# Patient Record
Sex: MALE | Race: WHITE | Employment: FULL TIME | URBAN - METROPOLITAN AREA
[De-identification: names, ages, dates, MRNs, and addresses within clinical notes are randomized per-mention and may not be internally consistent; named-entity substitution may affect disease eponyms.]

---

## 2017-01-09 ENCOUNTER — PRE VISIT (OUTPATIENT)
Dept: CARDIOLOGY | Facility: CLINIC | Age: 72
End: 2017-01-09

## 2017-01-12 ENCOUNTER — HOSPITAL ENCOUNTER (OUTPATIENT)
Dept: CARDIOLOGY | Facility: CLINIC | Age: 72
Discharge: HOME | End: 2017-01-12
Attending: INTERNAL MEDICINE | Admitting: INTERNAL MEDICINE
Payer: COMMERCIAL

## 2017-01-12 ENCOUNTER — OFFICE VISIT (OUTPATIENT)
Dept: CARDIOLOGY | Facility: CLINIC | Age: 72
End: 2017-01-12
Payer: COMMERCIAL

## 2017-01-12 VITALS
DIASTOLIC BLOOD PRESSURE: 80 MMHG | HEIGHT: 65 IN | SYSTOLIC BLOOD PRESSURE: 130 MMHG | WEIGHT: 176.7 LBS | BODY MASS INDEX: 29.44 KG/M2 | HEART RATE: 78 BPM

## 2017-01-12 DIAGNOSIS — E78.5 HYPERLIPIDEMIA LDL GOAL <130: ICD-10-CM

## 2017-01-12 DIAGNOSIS — I10 HYPERTENSION GOAL BP (BLOOD PRESSURE) < 140/90: ICD-10-CM

## 2017-01-12 DIAGNOSIS — I10 ESSENTIAL HYPERTENSION WITH GOAL BLOOD PRESSURE LESS THAN 140/90: ICD-10-CM

## 2017-01-12 DIAGNOSIS — I48.0 PAROXYSMAL ATRIAL FIBRILLATION (H): Primary | ICD-10-CM

## 2017-01-12 DIAGNOSIS — I48.0 PAROXYSMAL ATRIAL FIBRILLATION (H): ICD-10-CM

## 2017-01-12 PROCEDURE — 99214 OFFICE O/P EST MOD 30 MIN: CPT | Performed by: INTERNAL MEDICINE

## 2017-01-12 PROCEDURE — 0296T ZIO PATCH HOLTER: CPT

## 2017-01-12 PROCEDURE — 0298T ZZC EXT ECG > 48HR TO 21 DAY REVIEW AND INTERPRETATN: CPT | Performed by: INTERNAL MEDICINE

## 2017-01-12 PROCEDURE — 93000 ELECTROCARDIOGRAM COMPLETE: CPT | Performed by: INTERNAL MEDICINE

## 2017-01-12 NOTE — Clinical Note
1/12/2017    Michelle Morejon MD  [facility]  [address] [place] [address]    RE: Charlene Gutierrez       Dear Colleague,    I had the pleasure of seeing Charlene Gutierrez in the [facility].    REASON FOR VISIT:  Evaluation of paroxysmal atrial fibrillation.      Mr. Gutierrez is a pleasant 68-year-old gentleman with a history of hypertension, hyperlipidemia, paroxysmal atrial fibrillation who is here for routine evaluation.      I last saw him 11/2014.  At that time, he was doing well on flecainide.  I recommended decreasing the dose of flecainide and added metoprolol.      After our visit, he failed to follow up with us, and did not follow the instructions.  He informs that he attempted metoprolol, but he noticed his heart rate was slow and even though he was asymptomatic, he decided to discontinue it.  He has also discontinued the HCTZ (medication for blood pressure) as well as statin.      At the moment, he is doing well; however, he informs the last couple of months, he started having intermittent episodes of shortness of breath.  The episodes of shortness of breath seem to be atypical.  He describes as needing to take a sudden deep breath during moments in which he is sitting down.  He informs that the episode only lasted for a couple of seconds.  He cannot elaborate more on the episodes.  He denies any other symptoms of chest pain, lightheadedness, near-syncope or syncopal episode.      EKG done here today showed normal sinus rhythm with QRS measuring 102 milliseconds.     Outpatient Encounter Prescriptions as of 1/12/2017   Medication Sig Dispense Refill     flecainide (TAMBOCOR) 100 MG tablet Take 1 tablet (100 mg) by mouth 2 times daily 180 tablet 0     lisinopril (PRINIVIL,ZESTRIL) 5 MG tablet TAKE 1 TABLET DAILY 30 tablet 0     Elastic Bandages & Supports (JOBST FOR MEN KNEE HIGH/LG) MISC 1 each daily Use it on the right leg as instructed 1 each 1      sildenafil (VIAGRA) 50 MG tablet Take 0.5-1 tablets (25-50 mg) by mouth daily as needed for erectile dysfunction Take 30 min to 4 hours before intercourse.  Never use with nitroglycerin, terazosin or doxazosin. 36 tablet 0     ORDER FOR DME Equipment being ordered: heel lift- MD 1 Device 0     Elastic Bandage MISC 1 each daily. Use it on the right leg as instructed. 1 each 1     STATIN NOT PRESCRIBED, INTENTIONAL, by Other route continuous prn.  0     FISH OIL None Entered       ASPIRIN 81 MG OR TABS 1 TABLET DAILY       IRON 325 (65 FE) MG OR TABS 1 TABLET DAILY       [DISCONTINUED] hydrochlorothiazide (HYDRODIURIL) 25 MG tablet TAKE 1 TABLET DAILY 30 tablet 0     [DISCONTINUED] metoprolol (TOPROL-XL) 25 MG 24 hr tablet Take 0.5 tablets (12.5 mg) by mouth daily 30 tablet 3     No facility-administered encounter medications on file as of 1/12/2017.      ASSESSMENT AND PLAN:   1.  Sporadic episodes of atypical shortness of breath of unclear etiology.  I recommend repeating a Zio Patch monitor for 10 days.   2.  Paroxysmal atrial fibrillation, seems to be well controlled on flecainide.  At this time, I recommend restarting metoprolol and decrease the dose of flecainide in half.   3.  Embolic prevention.  CHADS-VASc score of 2.  I recommend starting anticoagulation; however, he is reluctant to do so.  We will continue aspirin.   4.  Hypertension.  Blood pressure is well controlled.   5.  Followup care.  Follow up in clinic in 6 months or earlier as needed.     Again, thank you for allowing me to participate in the care of your patient.      Sincerely,    Sreekanth Aguayo MD     [facility]    cc:   MIEK DEMPSEY  [facility]   [address] [place] [address]

## 2017-01-12 NOTE — PROGRESS NOTES
"HPI and Plan:   See dictation  [id number]    Physical Exam:  Vitals: /80 mmHg  Pulse 78  Ht 1.651 m (5' 5\")  Wt 80.151 kg (176 lb 11.2 oz)  BMI 29.40 kg/m2    Constitutional:  AAO x3.  Pt is in NAD.  HEAD: normocephalic.  SKIN: Skin normal color, texture and turgor with no lesions or eruptions.  Eyes: PERRL, EOMI.  ENT:  Supple, normal JVP. No lymphadenopathy or thyroid enlargement.  Chest:  CTAB.  Cardiac:  RRR, normal  S1 and S2.  No murmurs rubs or gallop.    Abdomen:  Normal BS.  Soft, non-tender and non-distended.  No rebound or guarding.    Extremities:  Pedious pulses palpable B/L.  No LE edema noticed.   Neurological: Strength and sensation grossly symmetric and intact throughout.       CURRENT MEDICATIONS:  Current Outpatient Prescriptions   Medication Sig Dispense Refill     flecainide (TAMBOCOR) 100 MG tablet Take 1 tablet (100 mg) by mouth 2 times daily 180 tablet 0     lisinopril (PRINIVIL,ZESTRIL) 5 MG tablet TAKE 1 TABLET DAILY 30 tablet 0     Elastic Bandages & Supports (JOBST FOR MEN KNEE HIGH/LG) MISC 1 each daily Use it on the right leg as instructed 1 each 1     sildenafil (VIAGRA) 50 MG tablet Take 0.5-1 tablets (25-50 mg) by mouth daily as needed for erectile dysfunction Take 30 min to 4 hours before intercourse.  Never use with nitroglycerin, terazosin or doxazosin. 36 tablet 0     ORDER FOR DME Equipment being ordered: heel lift- MD 1 Device 0     Elastic Bandage MISC 1 each daily. Use it on the right leg as instructed. 1 each 1     STATIN NOT PRESCRIBED, INTENTIONAL, by Other route continuous prn.  0     FISH OIL None Entered       ASPIRIN 81 MG OR TABS 1 TABLET DAILY       IRON 325 (65 FE) MG OR TABS 1 TABLET DAILY         ALLERGIES   No Known Allergies    PAST MEDICAL HISTORY:  Past Medical History   Diagnosis Date     HTN (hypertension), benign      Gastric ulcer      Hyperlipidaemia      Atrial fibrillation (H)        PAST SURGICAL HISTORY:  Past Surgical History   Procedure " Laterality Date     C incision of pyloric muscle       Bronchoscopy       Colonoscopy       Extracapsular cataract extration with intraocular lens implant  2003     right eye     Cardioversion  11/2013       FAMILY HISTORY:  Family History   Problem Relation Age of Onset     CANCER Mother      CANCER Father      Prostate Cancer No family hx of      Colon Cancer Father        SOCIAL HISTORY:  Social History     Social History     Marital Status:      Spouse Name: N/A     Number of Children: N/A     Years of Education: N/A     Social History Main Topics     Smoking status: Never Smoker      Smokeless tobacco: Never Used     Alcohol Use: Yes      Comment: ocass     Drug Use: No     Sexual Activity:     Partners: Female     Other Topics Concern     Parent/Sibling W/ Cabg, Mi Or Angioplasty Before 65f 55m? No      Service Yes     Blood Transfusions No     Caffeine Concern No     Occupational Exposure No     Hobby Hazards No     Sleep Concern No     Stress Concern No     Weight Concern No     Special Diet No     Back Care No     Exercise Yes     Bike Helmet No     Seat Belt Yes     Self-Exams No     Social History Narrative       Review of Systems:  Skin:  Negative     Eyes:  Positive for glasses  ENT:  Negative    Respiratory:  Positive for shortness of breath (posssibly due to medications)  Cardiovascular:  Negative    Gastroenterology: Negative    Genitourinary:  Negative    Musculoskeletal:  Positive for arthritis  Neurologic:  Negative    Psychiatric:  Negative    Heme/Lymph/Imm:  Negative    Endocrine:  Negative        Recent Lab Results:  LIPID RESULTS:  Lab Results   Component Value Date    CHOL 200* 05/28/2015    HDL 48 05/28/2015     05/28/2015    TRIG 130 05/28/2015    CHOLHDLRATIO 4.2 05/28/2015       LIVER ENZYME RESULTS:  Lab Results   Component Value Date    AST 17 05/28/2015    ALT 37 05/28/2015       CBC RESULTS:  Lab Results   Component Value Date    WBC 6.8 11/17/2013    RBC 4.60  11/17/2013    HGB 14.1 11/17/2013    HCT 41.2 11/17/2013    MCV 90 11/17/2013    MCH 30.7 11/17/2013    MCHC 34.2 11/17/2013    RDW 12.4 11/17/2013     11/17/2013       BMP RESULTS:  Lab Results   Component Value Date     05/28/2015    POTASSIUM 4.1 05/28/2015    CHLORIDE 104 05/28/2015    CO2 26 05/28/2015    ANIONGAP 8 05/28/2015    GLC 96 05/28/2015    BUN 22 05/28/2015    BUN 16.7 03/18/2014    CR 1.04 05/28/2015    GFRESTIMATED 71 05/28/2015    GFRESTBLACK 86 05/28/2015    CHUCKY 9.2 05/28/2015        A1C RESULTS:  Lab Results   Component Value Date    A1C 5.6 02/26/2013       INR RESULTS:  No results found for: INR      ECHOCARDIOGRAM  No results found for this or any previous visit (from the past 8760 hour(s)).      Orders Placed This Encounter   Procedures     Zio Patch Monitor     EKG 12-lead complete w/read - Clinics     No orders of the defined types were placed in this encounter.     Medications Discontinued During This Encounter   Medication Reason     hydrochlorothiazide (HYDRODIURIL) 25 MG tablet      metoprolol (TOPROL-XL) 25 MG 24 hr tablet          Encounter Diagnoses   Name Primary?     Essential hypertension with goal blood pressure less than 140/90      Hyperlipidemia LDL goal <130      Hypertension goal BP (blood pressure) < 140/90      Paroxysmal atrial fibrillation (H) Yes         CC  Michelle Morejon MD   [facility]  [address] [place] [address]

## 2017-01-13 NOTE — PROGRESS NOTES
REASON FOR VISIT:  Evaluation of paroxysmal atrial fibrillation.      HISTORY OF PRESENT ILLNESS:  Mr. Gutierrez is a pleasant 68-year-old gentleman with a history of hypertension, hyperlipidemia, paroxysmal atrial fibrillation who is here for routine evaluation.      I last saw him 11/2014.  At that time, he was doing well on flecainide.  I recommended decreasing the dose of flecainide and added metoprolol.      After our visit, he failed to follow up with us, and did not follow the instructions.  He informs that he attempted metoprolol, but he noticed his heart rate was slow and even though he was asymptomatic, he decided to discontinue it.  He has also discontinued the HCTZ (medication for blood pressure) as well as statin.      At the moment, he is doing well; however, he informs the last couple of months, he started having intermittent episodes of shortness of breath.  The episodes of shortness of breath seem to be atypical.  He describes as needing to take a sudden deep breath during moments in which he is sitting down.  He informs that the episode only lasted for a couple of seconds.  He cannot elaborate more on the episodes.  He denies any other symptoms of chest pain, lightheadedness, near-syncope or syncopal episode.      EKG done here today showed normal sinus rhythm with QRS measuring 102 milliseconds.      ASSESSMENT AND PLAN:   1.  Sporadic episodes of atypical shortness of breath of unclear etiology.  I recommend repeating a Zio Patch monitor for 10 days.   2.  Paroxysmal atrial fibrillation, seems to be well controlled on flecainide.  At this time, I recommend restarting metoprolol and decrease the dose of flecainide in half.   3.  Embolic prevention.  CHADS-VASc score of 2.  I recommend starting anticoagulation; however, he is reluctant to do so.  We will continue aspirin.   4.  Hypertension.  Blood pressure is well controlled.   5.  Followup care.  Follow up in clinic in 6 months or earlier as  needed.         MERE FARFAN MD             D: 2017 13:17   T: 2017 23:53   MT: ALISHA      Name:     ISIAH MERINO   MRN:      [id number]        Account:      [id number]   :      1945           Service Date: 2017      Document: [id number]

## 2017-01-31 ENCOUNTER — TELEPHONE (OUTPATIENT)
Dept: CARDIOLOGY | Facility: CLINIC | Age: 72
End: 2017-01-31

## 2017-01-31 DIAGNOSIS — I48.91 ATRIAL FIBRILLATION (H): Primary | ICD-10-CM

## 2017-01-31 NOTE — TELEPHONE ENCOUNTER
Message  Received: Today       Sreekanth Aguayo MD  [facility] Ep Nurse                     Dagoberto did not show any sustained arrhythmia.  Please update pt on results.  He was complaining of atypical SOB on daily basis when he saw me, and it does not seem that the SOB is related to arrhythmia.     Sreekanth       Writer called pt with results as above and verbalize understanding. Requesting clarification of what medications Dr. Aguayo would like him to take. States was told during OV on 1-12-17, to restart Metoprolol, so he has been taking 12.5 mg po BID, and secondly instructed to cut his Flecainide dosage in half, so he now takes Flecainide 100 mg (0.5 tablet) po BID from 100 mg po BID. In reviewing clinical note, Metoprolol has been discontinued. Pt states he will need RX sent to his [facility] for 90 day supplies if the Metoprolol is to be continued. Will route to Dr. Aguayo to clarify. PRIYA Padron RN.

## 2017-02-02 RX ORDER — METOPROLOL SUCCINATE 25 MG/1
12.5 TABLET, EXTENDED RELEASE ORAL DAILY
Qty: 45 TABLET | Refills: 3 | Status: SHIPPED | OUTPATIENT
Start: 2017-02-02 | End: 2017-06-02

## 2017-02-02 NOTE — TELEPHONE ENCOUNTER
Writer called pt and clarified dosage of Metoprolol. Pt verbalized undestanding to take  Metoprolol 25 mg, take 0.5 tablet, by mouth daily. Continue taking Flecainide 50 mg po BID. [facility] sent at pt's request. PRIYA Padron RN.

## 2017-03-13 ENCOUNTER — TRANSFERRED RECORDS (OUTPATIENT)
Dept: CARDIOLOGY | Facility: CLINIC | Age: 72
End: 2017-03-13

## 2017-03-13 LAB
ALBUMIN SERPL-MCNC: 3.9 G/DL
ALP SERPL-CCNC: 87 U/L
ALT SERPL-CCNC: 42 U/L
ANION GAP SERPL CALCULATED.3IONS-SCNC: 7 MMOL/L
AST SERPL-CCNC: 22 U/L
BILIRUB SERPL-MCNC: 0.6 MG/DL
BUN SERPL-MCNC: 23 MG/DL
CALCIUM SERPL-MCNC: 8.7 MG/DL
CHLORIDE SERPLBLD-SCNC: 108 MMOL/L
CHOL/HDLC RATIO - QUEST: 4.1
CHOLEST SERPL-MCNC: 188 MG/DL
CO2 SERPL-SCNC: 25 MMOL/L
CREAT SERPL-MCNC: 1.12 MG/DL
ERYTHROCYTE [DISTWIDTH] IN BLOOD BY AUTOMATED COUNT: 12.7 %
FERRITIN SERPL-MCNC: 135 NG/ML
GFR SERPL CREATININE-BSD FRML MDRD: >60 ML/MIN/1.73M2
GLUCOSE SERPL-MCNC: 101 MG/DL (ref 70–99)
HBA1C MFR BLD: 5.5 % (ref 0–5.7)
HCT VFR BLD AUTO: 40.7 %
HDLC SERPL-MCNC: 46 MG/DL
HEMOGLOBIN: 13.6 G/DL
IRON SATURATION: 30
IRON: 103 UG/DL
LDLC SERPL CALC-MCNC: 120 MG/DL
MCH RBC QN AUTO: 31.1 PG
MCHC RBC AUTO-ENTMCNC: 33.4 G/DL
MCV RBC AUTO: 93 FL
PLATELET # BLD AUTO: 249 10^9/L
POTASSIUM SERPL-SCNC: 4.4 MMOL/L
PROT SERPL-MCNC: 7 G/DL
RBC # BLD AUTO: 4.38 10^12/L
SODIUM SERPL-SCNC: 140 MMOL/L
TIBC - QUEST: 349
TRIGL SERPL-MCNC: 112 MG/DL
WBC # BLD AUTO: 7.1 10^9/L

## 2017-04-25 DIAGNOSIS — I48.0 PAROXYSMAL ATRIAL FIBRILLATION (H): Primary | ICD-10-CM

## 2017-04-25 RX ORDER — FLECAINIDE ACETATE 100 MG/1
100 TABLET ORAL 2 TIMES DAILY
Qty: 270 TABLET | Refills: 2 | Status: SHIPPED | OUTPATIENT
Start: 2017-04-25 | End: 2017-06-09

## 2017-04-25 NOTE — TELEPHONE ENCOUNTER
Request received from [facility] to re write Rx to dispense for 90 days, Resent new Rx. REJI Pryor

## 2017-05-31 ENCOUNTER — TELEPHONE (OUTPATIENT)
Dept: CARDIOLOGY | Facility: CLINIC | Age: 72
End: 2017-05-31

## 2017-05-31 ENCOUNTER — PRE VISIT (OUTPATIENT)
Dept: CARDIOLOGY | Facility: CLINIC | Age: 72
End: 2017-05-31

## 2017-05-31 ASSESSMENT — CHADS2 SCORE
HTN: YES
SEX: MALE
STROK/TIA/THROM: NO
DIABETES: NO
VASCULAR DISEASE: NO
CHF: NO
AGE: 65-74

## 2017-05-31 NOTE — TELEPHONE ENCOUNTER
"Charlene calls this morning as a transfer from scheduling to discuss some symptoms and medications. He was last seen in clinic by Dr. Aguayo in January 2017 for paroxysmal afib follow up - at that time he was restarted on metoprolol 12.5 (previously stopped taking on his own) and flecainide was reduced to 50 mg bid. Charlene calls today to say that for the last 2-3 days he has been having periods of intermittent afib where his pulse will range from 80s-1teens. He has no associated symptoms of shortness of breath, chest pain, lightheadedness, dizziness etc and states that he feels \"almost normal\". These periods of afib have only lasted an hour or two and will spontaneously break on their own - he is however, only taking aspirin as he has been hesitant to start anticoagulation. I spoke with Dr. Aguayo and he would like Charlene to increase the flecainide back to 75 mg twice daily. Charlene verbalized understanding of this as well as his follow up appointment on Friday with Symone Luna. No further questions/concerns. REJI Edmonds   "

## 2017-06-02 ENCOUNTER — DOCUMENTATION ONLY (OUTPATIENT)
Dept: CARDIOLOGY | Facility: CLINIC | Age: 72
End: 2017-06-02

## 2017-06-02 ENCOUNTER — OFFICE VISIT (OUTPATIENT)
Dept: CARDIOLOGY | Facility: CLINIC | Age: 72
End: 2017-06-02
Payer: COMMERCIAL

## 2017-06-02 VITALS
SYSTOLIC BLOOD PRESSURE: 118 MMHG | BODY MASS INDEX: 28.66 KG/M2 | WEIGHT: 172 LBS | HEART RATE: 84 BPM | DIASTOLIC BLOOD PRESSURE: 64 MMHG | HEIGHT: 65 IN

## 2017-06-02 DIAGNOSIS — I48.19 PERSISTENT ATRIAL FIBRILLATION (H): Primary | ICD-10-CM

## 2017-06-02 DIAGNOSIS — I48.91 ATRIAL FIBRILLATION, UNSPECIFIED TYPE (H): Primary | ICD-10-CM

## 2017-06-02 PROCEDURE — 99214 OFFICE O/P EST MOD 30 MIN: CPT | Mod: 25 | Performed by: PHYSICIAN ASSISTANT

## 2017-06-02 PROCEDURE — 93000 ELECTROCARDIOGRAM COMPLETE: CPT | Performed by: PHYSICIAN ASSISTANT

## 2017-06-02 RX ORDER — METOPROLOL SUCCINATE 25 MG/1
25 TABLET, EXTENDED RELEASE ORAL DAILY
Start: 2017-06-02 | End: 2017-08-01

## 2017-06-02 NOTE — PROGRESS NOTES
HPI and Plan:   See dictation #[id number]    Orders Placed This Encounter   Procedures     EKG 12-lead complete w/read - Clinics (performed today)       Orders Placed This Encounter   Medications     apixaban ANTICOAGULANT (ELIQUIS) 5 MG tablet     Sig: Take 1 tablet (5 mg) by mouth 2 times daily Samples given       Medications Discontinued During This Encounter   Medication Reason     ASPIRIN 81 MG OR TABS          Encounter Diagnosis   Name Primary?     Atrial fibrillation, unspecified type (H) Yes       CURRENT MEDICATIONS:  Current Outpatient Prescriptions   Medication Sig Dispense Refill     apixaban ANTICOAGULANT (ELIQUIS) 5 MG tablet Take 1 tablet (5 mg) by mouth 2 times daily Samples given       flecainide (TAMBOCOR) 100 MG tablet Take 1 tablet (100 mg) by mouth 2 times daily (Patient taking differently: Take 100 mg by mouth 2 times daily 75 twice daily) 270 tablet 2     metoprolol (TOPROL XL) 25 MG 24 hr tablet Take 0.5 tablets (12.5 mg) by mouth daily 45 tablet 3     lisinopril (PRINIVIL,ZESTRIL) 5 MG tablet TAKE 1 TABLET DAILY 30 tablet 0     Elastic Bandages & Supports (JOBST FOR MEN KNEE HIGH/LG) MISC 1 each daily Use it on the right leg as instructed 1 each 1     sildenafil (VIAGRA) 50 MG tablet Take 0.5-1 tablets (25-50 mg) by mouth daily as needed for erectile dysfunction Take 30 min to 4 hours before intercourse.  Never use with nitroglycerin, terazosin or doxazosin. 36 tablet 0     ORDER FOR DME Equipment being ordered: heel lift- MD 1 Device 0     Elastic Bandage MISC 1 each daily. Use it on the right leg as instructed. 1 each 1     STATIN NOT PRESCRIBED, INTENTIONAL, by Other route continuous prn.  0     FISH OIL None Entered       IRON 325 (65 FE) MG OR TABS 1 TABLET DAILY         ALLERGIES   No Known Allergies    PAST MEDICAL HISTORY:  Past Medical History:   Diagnosis Date     Atrial fibrillation (H) 11/17/2013     CKD (chronic kidney disease) 2012     Gastric ulcer      HTN (hypertension), benign  "     Hyperlipidaemia        PAST SURGICAL HISTORY:  Past Surgical History:   Procedure Laterality Date     BRONCHOSCOPY       C INCISION OF PYLORIC MUSCLE       CARDIOVERSION  11/2013     COLONOSCOPY       EXTRACAPSULAR CATARACT EXTRATION WITH INTRAOCULAR LENS IMPLANT  2003    right eye       FAMILY HISTORY:  Family History   Problem Relation Age of Onset     CANCER Mother      CANCER Father      Prostate Cancer No family hx of      Colon Cancer Father        SOCIAL HISTORY:  Social History     Social History     Marital status:      Spouse name: N/A     Number of children: N/A     Years of education: N/A     Social History Main Topics     Smoking status: Never Smoker     Smokeless tobacco: Never Used     Alcohol use Yes      Comment: ocass     Drug use: No     Sexual activity: Yes     Partners: Female     Other Topics Concern     Parent/Sibling W/ Cabg, Mi Or Angioplasty Before 65f 55m? No      Service Yes     Blood Transfusions No     Caffeine Concern No     Occupational Exposure No     Hobby Hazards No     Sleep Concern No     Stress Concern No     Weight Concern No     Special Diet No     Back Care No     Exercise Yes     Bike Helmet No     Seat Belt Yes     Self-Exams No     Social History Narrative       Review of Systems:  Skin:  Negative       Eyes:  Positive for glasses    ENT:  Negative      Respiratory:  Positive for shortness of breath;dyspnea on exertion     Cardiovascular:  Negative for;palpitations;chest pain;edema;syncope or near-syncope;exercise intolerance;fatigue;lightheadedness;dizziness      Gastroenterology: Negative for melena;hematochezia    Genitourinary:  Negative      Musculoskeletal:  Positive for arthritis    Neurologic:  Negative      Psychiatric:  Negative      Heme/Lymph/Imm:  Negative      Endocrine:  Negative        Physical Exam:  Vitals: /64  Pulse 84  Ht 1.651 m (5' 5\")  Wt 78 kg (172 lb)  BMI 28.62 kg/m2    Constitutional:  cooperative, alert and " oriented, well developed, well nourished, in no acute distress        Skin:  warm and dry to the touch, no apparent skin lesions or masses noted        Head:  normocephalic, no masses or lesions        Eyes:  conjunctivae and lids unremarkable;sclera white;pupils equal and round;no xanthalasma        ENT:  no pallor or cyanosis, dentition good        Neck:  JVP normal;no carotid bruit        Chest:  normal breath sounds, clear to auscultation, normal A-P diameter, normal symmetry, normal respiratory excursion, no use of accessory muscles          Cardiac:   irregular rhythm                Abdomen:  abdomen soft        Vascular: pulses full and equal                                        Extremities and Back:  no deformities, clubbing, cyanosis, erythema observed;no edema              Neurological:  affect appropriate, oriented to time, person and place

## 2017-06-02 NOTE — PROGRESS NOTES
Called pt after d/w Dr. Aguayo.     Will increase metoprolol XL to 25 mg daily  Continue flecainde 75 mg twice daily. May increase to 100 mg BID at time of DCCV    Set up for RHETT/DCCV. H&P done for this at today's OV 6/2/17.    Voiced understanding - ready to proceed. I lost pt during transfer ... Precious will call to set this up.

## 2017-06-02 NOTE — MR AVS SNAPSHOT
After Visit Summary   6/2/2017    Charlene Gutierrez    MRN: [id number]           Patient Information     Date Of Birth          1945        Visit Information        Provider Department      6/2/2017 7:30 AM Jyoti Luna PA-C [facility] HEART AT [place]        Today's Diagnoses     Atrial fibrillation, unspecified type (H)    -  1      Care Instructions    1. EKG today looks like atrial fibrillation vs atrial flutter. I'll talk to Dr. Aguayo and call you with his recommendations. He might want RHETT/cardioversion or may want just cardioversion in 1 month OR may just want to continue medications    2. Start Eliquis 5 mg twice a day (blood thinner). DO NOT STOP!!!      3. Stop aspirin.    4. AFib nurses Hailey/Jaz: [phone]          Follow-ups after your visit        Who to contact     If you have questions or need follow up information about today's clinic visit or your schedule please contact [facility] HEART [facility] directly at [phone].  Normal or non-critical lab and imaging results will be communicated to you by [employer], letter or phone within 4 business days after the clinic has received the results. If you do not hear from us within 7 days, please contact the clinic through [employer] or phone. If you have a critical or abnormal lab result, we will notify you by phone as soon as possible.  Submit refill requests through [employer] or call your pharmacy and they will forward the refill request to us. Please allow 3 business days for your refill to be completed.          Additional Information About Your Visit        [employer] Information     [employer] gives you secure access to your electronic health record. If you see a primary care provider, you can also send messages to your care team and make appointments. If you have questions, please call your primary care clinic.  If you do not have a primary care provider, please call  "[phone] and they will assist you.        Care EveryWhere ID     This is your Care EveryWhere ID. This could be used by other organizations to access your [place] medical records  [id number]        Your Vitals Were     Pulse Height BMI (Body Mass Index)             84 1.651 m (5' 5\") 28.62 kg/m2          Blood Pressure from Last 3 Encounters:   06/02/17 118/64   01/12/17 130/80   11/26/14 124/68    Weight from Last 3 Encounters:   06/02/17 78 kg (172 lb)   01/12/17 80.2 kg (176 lb 11.2 oz)   11/26/14 73 kg (161 lb)              We Performed the Following     EKG 12-lead complete w/read - Clinics (performed today)          Today's Medication Changes          These changes are accurate as of: 6/2/17  8:02 AM.  If you have any questions, ask your nurse or doctor.               Start taking these medicines.        Dose/Directions    apixaban ANTICOAGULANT 5 MG tablet   Commonly known as:  ELIQUIS   Used for:  Atrial fibrillation, unspecified type (H)   Started by:  Jyoti Luna PA-C        Dose:  5 mg   Take 1 tablet (5 mg) by mouth 2 times daily Samples given   Refills:  0         These medicines have changed or have updated prescriptions.        Dose/Directions    flecainide 100 MG tablet   Commonly known as:  TAMBOCOR   This may have changed:  additional instructions   Used for:  Paroxysmal atrial fibrillation (H)        Dose:  100 mg   Take 1 tablet (100 mg) by mouth 2 times daily   Quantity:  270 tablet   Refills:  2         Stop taking these medicines if you haven't already. Please contact your care team if you have questions.     aspirin 81 MG tablet   Stopped by:  Jyoti Luna PA-C                Where to get your medicines      Some of these will need a paper prescription and others can be bought over the counter.  Ask your nurse if you have questions.     You don't need a prescription for these medications     apixaban ANTICOAGULANT 5 MG tablet                Primary Care " Provider Office Phone # Fax #    Ann [place] [phone]       [facility] [address] [place] [address]        Thank you!     Thank you for choosing [facility] AT [place]  for your care. Our goal is always to provide you with excellent care. Hearing back from our patients is one way we can continue to improve our services. Please take a few minutes to complete the written survey that you may receive in the mail after your visit with us. Thank you!             Your Updated Medication List - Protect others around you: Learn how to safely use, store and throw away your medicines at www.disposemymeds.org.          This list is accurate as of: 6/2/17  8:02 AM.  Always use your most recent med list.                   Brand Name Dispense Instructions for use    apixaban ANTICOAGULANT 5 MG tablet    ELIQUIS     Take 1 tablet (5 mg) by mouth 2 times daily Samples given       * Elastic Bandage Misc     1 each    1 each daily. Use it on the right leg as instructed.       * JOBST FOR MEN KNEE HIGH/LG Misc     1 each    1 each daily Use it on the right leg as instructed       FISH OIL      None Entered       flecainide 100 MG tablet    TAMBOCOR    270 tablet    Take 1 tablet (100 mg) by mouth 2 times daily       iron 325 (65 FE) MG tablet      1 TABLET DAILY       lisinopril 5 MG tablet    PRINIVIL/ZESTRIL    30 tablet    TAKE 1 TABLET DAILY       metoprolol 25 MG 24 hr tablet    TOPROL XL    45 tablet    Take 0.5 tablets (12.5 mg) by mouth daily       order for DME     1 Device    Equipment being ordered: heel lift- MD       sildenafil 50 MG cap/tab    REVATIO/VIAGRA    36 tablet    Take 0.5-1 tablets (25-50 mg) by mouth daily as needed for erectile dysfunction Take 30 min to 4 hours before intercourse.  Never use with nitroglycerin, terazosin or doxazosin.       STATIN NOT PRESCRIBED (INTENTIONAL)      by Other route continuous prn.       * Notice:   This list has 2 medication(s) that are the same as other medications prescribed for you. Read the directions carefully, and ask your doctor or other care provider to review them with you.

## 2017-06-02 NOTE — PATIENT INSTRUCTIONS
1. EKG today looks like atrial fibrillation vs atrial flutter. I'll talk to Dr. Aguayo and call you with his recommendations. He might want RHETT/cardioversion or may want just cardioversion in 1 month OR may just want to continue medications    2. Start Eliquis 5 mg twice a day (blood thinner). DO NOT STOP!!!      3. Stop aspirin.    4. AFib nurses Hailey/Jaz: [phone]

## 2017-06-02 NOTE — LETTER
6/2/2017    MIKE  DEMPSEY  [facility]   [address]   [place] [address]    RE: Charlene Gutierrez       Dear Colleague,    I had the pleasure of meeting Charlene today when he came for concerns regarding recurrent atrial fibrillation.  He is a pleasant 71-year-old man who has a history of hypertension and dyslipidemia.  He was first diagnosed with atrial fibrillation back in 10/2013 when he presented to the Emergency Department and underwent DC cardioversion.  He had seen Dr. Aguayo and for a short time had been on Multaq.  He later was switched to a combination of flecainide and metoprolol.  When he saw Dr. Aguayo back in January, the patient had discontinued metoprolol on his own for no apparent reason.  He had also stopped his statin and hydrochlorothiazide.  At that appointment, Dr. Aguayo elicited a complaint of occasional shortness of breath.  He asked him to decrease flecainide to 50 mg twice daily, start metoprolol succinate 12.5 mg daily and ordered a Zio Patch to ensure that episodes of shortness of breath were not related to atrial fibrillation.  Unfortunately, despite a CHADS-VASc score of 2 (hypertension and age), he refused anticoagulation and remained only on aspirin.      Followup Zio Patch showed no evidence of atrial fibrillation.  He did have some atrial tachycardia and PSVT less than 14 beats.  These were asymptomatic.  His shortness of breath has improved, but no specific etiology has been found for this.      On 05/31 he contacted our office due to concerns regarding irregular heart rate.  He states that he would occasionally feel lightheadedness starting on over Memorial Day weekend.  He checked his pulse and noted it was irregular, anywhere from the 90s to 110s.  Blood pressures were anywhere from the 100s to the 150s, with the majority of these in the low 100s.  He contacted our nurse, and Dr. Aguayo recommended increasing his flecainide to 75 mg twice daily and  continuing metoprolol.  He has been on this higher dose just since 05/31.      Mr. Gutierrez thinks that he has remained in atrial fibrillation despite the increase in his flecainide dosing.  His heart rate continues to be irregular, but he denies any problems with shortness of breath, palpitations of significant lightheadedness.  He gets occasional dizziness but states that this is not dramatic.  He denies edema, orthopnea or PND.  He cannot think of any reason why he would have gone into atrial fibrillation, specifically denying increased use of alcohol or caffeine.      EKG today, which I overread, confirms atrial fibrillation at 93 beats per minute, has the left anterior fascicular block, QRS duration was 97 milliseconds.  Heart rate in AFib was under control at 93 beats per minute.   Outpatient Encounter Prescriptions as of 6/2/2017   Medication Sig Dispense Refill     apixaban ANTICOAGULANT (ELIQUIS) 5 MG tablet Take 1 tablet (5 mg) by mouth 2 times daily Samples given       flecainide (TAMBOCOR) 100 MG tablet Take 1 tablet (100 mg) by mouth 2 times daily (Patient taking differently: Take 100 mg by mouth 2 times daily 75 twice daily) 270 tablet 2     [DISCONTINUED] metoprolol (TOPROL XL) 25 MG 24 hr tablet Take 0.5 tablets (12.5 mg) by mouth daily 45 tablet 3     lisinopril (PRINIVIL,ZESTRIL) 5 MG tablet TAKE 1 TABLET DAILY 30 tablet 0     Elastic Bandages & Supports (JOBST FOR MEN KNEE HIGH/LG) MISC 1 each daily Use it on the right leg as instructed 1 each 1     sildenafil (VIAGRA) 50 MG tablet Take 0.5-1 tablets (25-50 mg) by mouth daily as needed for erectile dysfunction Take 30 min to 4 hours before intercourse.  Never use with nitroglycerin, terazosin or doxazosin. 36 tablet 0     ORDER FOR DME Equipment being ordered: heel lift- MD 1 Device 0     Elastic Bandage MISC 1 each daily. Use it on the right leg as instructed. 1 each 1     STATIN NOT PRESCRIBED, INTENTIONAL, by Other route continuous prn.  0      FISH OIL None Entered       IRON 325 (65 FE) MG OR TABS 1 TABLET DAILY       [DISCONTINUED] ASPIRIN 81 MG OR TABS 1 TABLET DAILY       No facility-administered encounter medications on file as of 6/2/2017.       ASSESSMENT AND PLAN:     1.  Recurrent persistent atrial fibrillation.  He has been in AFib, he believes at least since Memorial Day weekend.  He does not think he has had paroxysms of this but instead thinks that this has been continuous.  Given his CHADS-VASc score of 2, I will initiate anticoagulation.  I will have him stop baby aspirin and start Eliquis 5 mg twice daily.  His most recent CBC done at his primary care physician's office on 03/13/2017 was 13.6g/dL.  He does continue iron supplementations.  I will talk to Dr. Aguayo about proceeding with a RHETT/DC cardioversion versus starting anticoagulation for a month and proceeding with cardioversion after a month, his heart rate is under adequate control in the 90s, so until I talk to Dr. Aguayo I will not change any of his medication doses.      We did talk about the risks, benefits and indications of both RHETT and DC cardioversion should Dr. Aguayo want to proceed with this including but not limited to esophageal injury, sore throat, risk of conscious sedation, need for a  and with cardioversion increased risk of stroke (hopefully mitigated with the use of Eliquis), surface burns and tachy or bradyarrhythmias requiring additional treatment.  He voiced understanding and would be willing to proceed if Dr. Aguayo agrees.      I will be in touch with him as soon as I speak with Dr. Aguayo.      2.  Hypertension.  Blood pressure is actually under good control.  He does not take the lisinopril 5 mg if his blood pressure is under 100, which has happened occasionally, but does take it most of the time.  We will have him continue his current medications with the exception of discontinuing aspirin as above.      It has been a pleasure to see him in clinic.  I  will speak with Dr. Aguayo and call him.     Sincerely,    Jyoti Luna PA-C     [facility]

## 2017-06-02 NOTE — PROGRESS NOTES
HISTORY OF PRESENT ILLNESS:  I had the pleasure of meeting Charlene today when he came for concerns regarding recurrent atrial fibrillation.  He is a pleasant 71-year-old man who has a history of hypertension and dyslipidemia.  He was first diagnosed with atrial fibrillation back in 10/2013 when he presented to the Emergency Department and underwent DC cardioversion.  He had seen Dr. Aguayo and for a short time had been on Multaq.  He later was switched to a combination of flecainide and metoprolol.  When he saw Dr. Aguayo back in January, the patient had discontinued metoprolol on his own for no apparent reason.  He had also stopped his statin and hydrochlorothiazide.  At that appointment, Dr. Aguayo elicited a complaint of occasional shortness of breath.  He asked him to decrease flecainide to 50 mg twice daily, start metoprolol succinate 12.5 mg daily and ordered a Zio Patch to ensure that episodes of shortness of breath were not related to atrial fibrillation.  Unfortunately, despite a CHADS-VASc score of 2 (hypertension and age), he refused anticoagulation and remained only on aspirin.      Followup Zio Patch showed no evidence of atrial fibrillation.  He did have some atrial tachycardia and PSVT less than 14 beats.  These were asymptomatic.  His shortness of breath has improved, but no specific etiology has been found for this.      On 05/31 he contacted our office due to concerns regarding irregular heart rate.  He states that he would occasionally feel lightheadedness starting on over Memorial Day weekend.  He checked his pulse and noted it was irregular, anywhere from the 90s to 110s.  Blood pressures were anywhere from the 100s to the 150s, with the majority of these in the low 100s.  He contacted our nurse, and Dr. Aguayo recommended increasing his flecainide to 75 mg twice daily and continuing metoprolol.  He has been on this higher dose just since 05/31.      Mr. Gutierrez thinks that he has remained in atrial  fibrillation despite the increase in his flecainide dosing.  His heart rate continues to be irregular, but he denies any problems with shortness of breath, palpitations of significant lightheadedness.  He gets occasional dizziness but states that this is not dramatic.  He denies edema, orthopnea or PND.  He cannot think of any reason why he would have gone into atrial fibrillation, specifically denying increased use of alcohol or caffeine.      EKG today, which I overread, confirms atrial fibrillation at 93 beats per minute, has the left anterior fascicular block, QRS duration was 97 milliseconds.  Heart rate in AFib was under control at 93 beats per minute.        ASSESSMENT AND PLAN:     1.  Recurrent persistent atrial fibrillation.  He has been in AFib, he believes at least since Memorial Day weekend.  He does not think he has had paroxysms of this but instead thinks that this has been continuous.  Given his CHADS-VASc score of 2, I will initiate anticoagulation.  I will have him stop baby aspirin and start Eliquis 5 mg twice daily.  His most recent CBC done at his primary care physician's office on 03/13/2017 was 13.6g/dL.  He does continue iron supplementations.  I will talk to Dr. Aguayo about proceeding with a RHETT/DC cardioversion versus starting anticoagulation for a month and proceeding with cardioversion after a month, his heart rate is under adequate control in the 90s, so until I talk to Dr. Aguayo I will not change any of his medication doses.      We did talk about the risks, benefits and indications of both RHETT and DC cardioversion should Dr. Aguayo want to proceed with this including but not limited to esophageal injury, sore throat, risk of conscious sedation, need for a  and with cardioversion increased risk of stroke (hopefully mitigated with the use of Eliquis), surface burns and tachy or bradyarrhythmias requiring additional treatment.  He voiced understanding and would be willing to proceed  if Dr. Aguayo agrees.      I will be in touch with him as soon as I speak with Dr. Aguayo.      2.  Hypertension.  Blood pressure is actually under good control.  He does not take the lisinopril 5 mg if his blood pressure is under 100, which has happened occasionally, but does take it most of the time.  We will have him continue his current medications with the exception of discontinuing aspirin as above.      It has been a pleasure to see him in clinic.  I will speak with Dr. Aguayo and call him.         DANIELA BRYAN PA-C             D: 2017 08:13   T: 2017 15:22   MT: ALISHA      Name:     ISIAH MERINO   MRN:      [id number]        Account:      [id number]   :      1945           Service Date: 2017      Document: [id number]

## 2017-06-05 ENCOUNTER — ANESTHESIA EVENT (OUTPATIENT)
Dept: SURGERY | Facility: CLINIC | Age: 72
End: 2017-06-05
Payer: COMMERCIAL

## 2017-06-05 ENCOUNTER — SURGERY (OUTPATIENT)
Age: 72
End: 2017-06-05

## 2017-06-05 ENCOUNTER — HOSPITAL ENCOUNTER (OUTPATIENT)
Dept: CARDIOLOGY | Facility: CLINIC | Age: 72
End: 2017-06-05
Attending: PHYSICIAN ASSISTANT | Admitting: INTERNAL MEDICINE
Payer: COMMERCIAL

## 2017-06-05 ENCOUNTER — ANESTHESIA (OUTPATIENT)
Dept: SURGERY | Facility: CLINIC | Age: 72
End: 2017-06-05
Payer: COMMERCIAL

## 2017-06-05 ENCOUNTER — HOSPITAL ENCOUNTER (OUTPATIENT)
Dept: SURGERY | Facility: CLINIC | Age: 72
End: 2017-06-05
Attending: PHYSICIAN ASSISTANT | Admitting: INTERNAL MEDICINE
Payer: COMMERCIAL

## 2017-06-05 ENCOUNTER — HOSPITAL ENCOUNTER (OUTPATIENT)
Facility: CLINIC | Age: 72
Discharge: HOME | End: 2017-06-05
Attending: INTERNAL MEDICINE | Admitting: INTERNAL MEDICINE
Payer: COMMERCIAL

## 2017-06-05 VITALS
RESPIRATION RATE: 8 BRPM | SYSTOLIC BLOOD PRESSURE: 101 MMHG | BODY MASS INDEX: 27.74 KG/M2 | OXYGEN SATURATION: 98 % | HEIGHT: 66 IN | TEMPERATURE: 97.8 F | DIASTOLIC BLOOD PRESSURE: 77 MMHG | WEIGHT: 172.6 LBS | HEART RATE: 89 BPM

## 2017-06-05 DIAGNOSIS — I48.19 PERSISTENT ATRIAL FIBRILLATION (H): ICD-10-CM

## 2017-06-05 LAB
INR PPP: 1.18 (ref 0.86–1.14)
MAGNESIUM SERPL-MCNC: 2.1 MG/DL (ref 1.6–2.3)
POTASSIUM SERPL-SCNC: 4.4 MMOL/L (ref 3.4–5.3)

## 2017-06-05 PROCEDURE — 83735 ASSAY OF MAGNESIUM: CPT | Performed by: INTERNAL MEDICINE

## 2017-06-05 PROCEDURE — 84132 ASSAY OF SERUM POTASSIUM: CPT | Performed by: INTERNAL MEDICINE

## 2017-06-05 PROCEDURE — 92960 CARDIOVERSION ELECTRIC EXT: CPT

## 2017-06-05 PROCEDURE — 93325 DOPPLER ECHO COLOR FLOW MAPG: CPT

## 2017-06-05 PROCEDURE — 25000128 H RX IP 250 OP 636: Performed by: INTERNAL MEDICINE

## 2017-06-05 PROCEDURE — 37000008 ZZH ANESTHESIA TECHNICAL FEE, 1ST 30 MIN

## 2017-06-05 PROCEDURE — 93320 DOPPLER ECHO COMPLETE: CPT | Mod: 26 | Performed by: INTERNAL MEDICINE

## 2017-06-05 PROCEDURE — 93325 DOPPLER ECHO COLOR FLOW MAPG: CPT | Mod: 26 | Performed by: INTERNAL MEDICINE

## 2017-06-05 PROCEDURE — 25000132 ZZH RX MED GY IP 250 OP 250 PS 637: Performed by: INTERNAL MEDICINE

## 2017-06-05 PROCEDURE — 85610 PROTHROMBIN TIME: CPT | Performed by: INTERNAL MEDICINE

## 2017-06-05 PROCEDURE — 25000125 ZZHC RX 250: Performed by: INTERNAL MEDICINE

## 2017-06-05 PROCEDURE — 40000010 ZZH STATISTIC ANES STAT CODE-CRNA PER MINUTE

## 2017-06-05 PROCEDURE — 25000125 ZZHC RX 250: Performed by: NURSE ANESTHETIST, CERTIFIED REGISTERED

## 2017-06-05 PROCEDURE — 92960 CARDIOVERSION ELECTRIC EXT: CPT | Performed by: INTERNAL MEDICINE

## 2017-06-05 PROCEDURE — 40000857 ZZH STATISTIC TEE INCLUDES SEDATION

## 2017-06-05 PROCEDURE — 93312 ECHO TRANSESOPHAGEAL: CPT | Mod: 26 | Performed by: INTERNAL MEDICINE

## 2017-06-05 RX ORDER — POTASSIUM CHLORIDE 1500 MG/1
40 TABLET, EXTENDED RELEASE ORAL
Status: DISCONTINUED | OUTPATIENT
Start: 2017-06-05 | End: 2017-06-05 | Stop reason: HOSPADM

## 2017-06-05 RX ORDER — GLYCOPYRROLATE 0.2 MG/ML
0.1 INJECTION, SOLUTION INTRAMUSCULAR; INTRAVENOUS ONCE
Status: COMPLETED | OUTPATIENT
Start: 2017-06-05 | End: 2017-06-05

## 2017-06-05 RX ORDER — NALOXONE HYDROCHLORIDE 0.4 MG/ML
.1-.4 INJECTION, SOLUTION INTRAMUSCULAR; INTRAVENOUS; SUBCUTANEOUS
Status: DISCONTINUED | OUTPATIENT
Start: 2017-06-05 | End: 2017-06-05 | Stop reason: HOSPADM

## 2017-06-05 RX ORDER — SODIUM CHLORIDE 9 MG/ML
1000 INJECTION, SOLUTION INTRAVENOUS CONTINUOUS
Status: DISCONTINUED | OUTPATIENT
Start: 2017-06-05 | End: 2017-06-05 | Stop reason: HOSPADM

## 2017-06-05 RX ORDER — FENTANYL CITRATE 50 UG/ML
25-50 INJECTION, SOLUTION INTRAMUSCULAR; INTRAVENOUS
Status: COMPLETED | OUTPATIENT
Start: 2017-06-05 | End: 2017-06-05

## 2017-06-05 RX ORDER — FENTANYL CITRATE 50 UG/ML
25 INJECTION, SOLUTION INTRAMUSCULAR; INTRAVENOUS
Status: DISCONTINUED | OUTPATIENT
Start: 2017-06-05 | End: 2017-06-05 | Stop reason: HOSPADM

## 2017-06-05 RX ORDER — LIDOCAINE HYDROCHLORIDE 40 MG/ML
SOLUTION TOPICAL ONCE
Status: COMPLETED | OUTPATIENT
Start: 2017-06-05 | End: 2017-06-05

## 2017-06-05 RX ORDER — ATROPINE SULFATE 0.1 MG/ML
.5-1 INJECTION INTRAVENOUS
Status: DISCONTINUED | OUTPATIENT
Start: 2017-06-05 | End: 2017-06-05 | Stop reason: HOSPADM

## 2017-06-05 RX ORDER — POTASSIUM CHLORIDE 1500 MG/1
20 TABLET, EXTENDED RELEASE ORAL
Status: DISCONTINUED | OUTPATIENT
Start: 2017-06-05 | End: 2017-06-05 | Stop reason: HOSPADM

## 2017-06-05 RX ORDER — PROPOFOL 10 MG/ML
INJECTION, EMULSION INTRAVENOUS PRN
Status: DISCONTINUED | OUTPATIENT
Start: 2017-06-05 | End: 2017-06-05

## 2017-06-05 RX ORDER — FLUMAZENIL 0.1 MG/ML
0.2 INJECTION, SOLUTION INTRAVENOUS
Status: DISCONTINUED | OUTPATIENT
Start: 2017-06-05 | End: 2017-06-05 | Stop reason: HOSPADM

## 2017-06-05 RX ADMIN — MIDAZOLAM HYDROCHLORIDE 2 MG: 1 INJECTION, SOLUTION INTRAMUSCULAR; INTRAVENOUS at 08:53

## 2017-06-05 RX ADMIN — SODIUM CHLORIDE 1000 ML: 9 INJECTION, SOLUTION INTRAVENOUS at 07:41

## 2017-06-05 RX ADMIN — FENTANYL CITRATE 50 MCG: 50 INJECTION, SOLUTION INTRAMUSCULAR; INTRAVENOUS at 08:54

## 2017-06-05 RX ADMIN — PROPOFOL 50 MG: 10 INJECTION, EMULSION INTRAVENOUS at 09:15

## 2017-06-05 RX ADMIN — BENZOCAINE 2 SPRAY: 220 SPRAY, METERED PERIODONTAL at 08:46

## 2017-06-05 RX ADMIN — GLYCOPYRROLATE 0.1 MG: 0.2 INJECTION, SOLUTION INTRAMUSCULAR; INTRAVENOUS at 08:17

## 2017-06-05 RX ADMIN — LIDOCAINE HYDROCHLORIDE: 40 SOLUTION TOPICAL at 08:20

## 2017-06-05 ASSESSMENT — ENCOUNTER SYMPTOMS
DYSRHYTHMIAS: 1
SEIZURES: 0

## 2017-06-05 NOTE — PROGRESS NOTES
Cardioversion note:    71 year old with PAF is sent for a RHETT guided DCCV after developing recurrent PAF.  He has been on Apixiban since the 2nd of this month.  He is otherwise without symptoms and his exam is normal with the exception of his AF.  Informed consent was obtained after explaining the risks and benefits of the procedure with the patient along with alternatives.  The patient was in agreement with the DCCV.  His pre-DCCV RHETT did not show evidence for a left atrial appendage clot.    Pads placed in the AP position and one 200 J Biphasic shock was delivered which resulted in conversion to SR.  There were no complications noted.  The patient was instructed to remain on his Apixiban and follow up with the EP service.    Please see anesthesia's separate note related to the patients sedation.    Hernesto Joiner MD

## 2017-06-05 NOTE — IP AVS SNAPSHOT
[place] [address]    [place] [address]    Phone:  [phone]                                       After Visit Summary   6/5/2017    Charlene Gutierrez    MRN: [id number]           After Visit Summary Signature Page     I have received my discharge instructions, and my questions have been answered. I have discussed any challenges I see with this plan with the nurse or doctor.    ..........................................................................................................................................  Patient/Patient Representative Signature      ..........................................................................................................................................  Patient Representative Print Name and Relationship to Patient    ..................................................               ................................................  Date                                            Time    ..........................................................................................................................................  Reviewed by Signature/Title    ...................................................              ..............................................  Date                                                            Time

## 2017-06-05 NOTE — DISCHARGE INSTRUCTIONS
RHETT  (Transesophageal Echocardiogram)  Discharge Instructions    After you go home:      Have an adult stay with you for 6 hours.       For 24 hours - due to the sedation you received:    Relax and take it easy.    Do NOT make any important or legal decisions.    Do NOT drive or operate machines at home or at work.    Do NOT drink alcohol.    Diet:    You may resume your normal diet, but no scratchy foods for two days.    If your throat is sore, eat cold, bland or soft foods.    You may have heartburn if the tube used in the exam entered your stomach.  If so:   - Do not eat acidic and spicy foods.   - Do not eat three hours before bedtime. Clear liquids are okay.   - When lying down, use two pillows to raise your head.    Medicines:      Take your medications, including blood thinners, unless your provider tells you not to.    If you have stopped any other medicines, check with your provider about when to restart them.    You may take Tylenol (Acetaminophen) if your throat is sore.    You may take antacids if you have heartburn.      Follow Up Appointments:      Follow up with your cardiologist at [facility] of patient preference as instructed.    Follow up with your primary care provider as needed.    Call the clinic if:      You have heartburn that is severe or lasts more than 72 hours.    You have a sore throat that feels worse after 72 hours.    You have shortness of breath, neck pain, chest pain, fever, chills, coughing up blood, or other unusual signs.    Questions or concerns      [facility] [place]:    [phone] [facility] (7 days a week)

## 2017-06-05 NOTE — PROGRESS NOTES
Patient tolerated procedure without problems. Cardioverted in normal sinus rhythm. Discharge instructions reviewed with patient and wife. Voiced understanding. Discharged in medically stable condition per wheelchair to wife at 1035.

## 2017-06-05 NOTE — PROGRESS NOTES
Patient sedated by anesthesia. Pads applied and shock x1 delivered with 200 joules by Dr Joiner. Immediate conversion to sinus rhythm. Will monitor post anesthesia.

## 2017-06-05 NOTE — IP AVS SNAPSHOT
MRN:[id number]                      After Visit Summary   6/5/2017    Charlene Gutierrez    MRN: [id number]           Visit Information        Department      6/5/2017  6:34 AM [facility]          Review of your medicines      UNREVIEWED medicines. Ask your doctor about these medicines        Dose / Directions    apixaban ANTICOAGULANT 5 MG tablet   Commonly known as:  ELIQUIS   Used for:  Atrial fibrillation, unspecified type (H)        Dose:  5 mg   Take 1 tablet (5 mg) by mouth 2 times daily Samples given   Refills:  0       FISH OIL        None Entered   Refills:  0       flecainide 100 MG tablet   Commonly known as:  TAMBOCOR   Used for:  Paroxysmal atrial fibrillation (H)        Dose:  100 mg   Take 1 tablet (100 mg) by mouth 2 times daily   Quantity:  270 tablet   Refills:  2       iron 325 (65 FE) MG tablet        1 TABLET DAILY   Refills:  0       lisinopril 5 MG tablet   Commonly known as:  PRINIVIL/ZESTRIL   Used for:  Essential hypertension with goal blood pressure less than 140/90        TAKE 1 TABLET DAILY   Quantity:  30 tablet   Refills:  0       metoprolol 25 MG 24 hr tablet   Commonly known as:  TOPROL XL   Used for:  Persistent atrial fibrillation (H)        Dose:  25 mg   Take 1 tablet (25 mg) by mouth daily   Refills:  0       sildenafil 50 MG cap/tab   Commonly known as:  REVATIO/VIAGRA   Used for:  Erectile dysfunction        Dose:  25-50 mg   Take 0.5-1 tablets (25-50 mg) by mouth daily as needed for erectile dysfunction Take 30 min to 4 hours before intercourse.  Never use with nitroglycerin, terazosin or doxazosin.   Quantity:  36 tablet   Refills:  0       STATIN NOT PRESCRIBED (INTENTIONAL)   Used for:  Hypertension goal BP (blood pressure) < 140/90, Hyperlipidemia LDL goal <130        by Other route continuous prn.   Refills:  0         CONTINUE these medicines which have NOT CHANGED        Dose / Directions    * Elastic Bandage Misc   Used for:   Varicose veins        Dose:  1 each   1 each daily. Use it on the right leg as instructed.   Quantity:  1 each   Refills:  1       * JOBST FOR MEN KNEE HIGH/LG Misc   Used for:  Varicose veins        Dose:  1 each   1 each daily Use it on the right leg as instructed   Quantity:  1 each   Refills:  1       order for DME   Used for:  Achilles tendinosis, right        Equipment being ordered: heel lift- MD   Quantity:  1 Device   Refills:  0       * Notice:  This list has 2 medication(s) that are the same as other medications prescribed for you. Read the directions carefully, and ask your doctor or other care provider to review them with you.             Protect others around you: Learn how to safely use, store and throw away your medicines at www.disposemymeds.org.         Follow-ups after your visit        Your next 10 appointments already scheduled     Jun 26, 2017  1:50 PM CDT   [facility] EP RETURN with Jyoti Luna PA-C   [facility] ([facility])    [address] [place] [id number] [phone]               Care Instructions        Further instructions from your care team       RHETT  (Transesophageal Echocardiogram)  Discharge Instructions    After you go home:      Have an adult stay with you for 6 hours.       For 24 hours - due to the sedation you received:    Relax and take it easy.    Do NOT make any important or legal decisions.    Do NOT drive or operate machines at home or at work.    Do NOT drink alcohol.    Diet:    You may resume your normal diet, but no scratchy foods for two days.    If your throat is sore, eat cold, bland or soft foods.    You may have heartburn if the tube used in the exam entered your stomach.  If so:   - Do not eat acidic and spicy foods.   - Do not eat three hours before bedtime. Clear liquids are okay.   - When lying down, use two pillows to raise your head.    Medicines:      Take your medications,  "including blood thinners, unless your provider tells you not to.    If you have stopped any other medicines, check with your provider about when to restart them.    You may take Tylenol (Acetaminophen) if your throat is sore.    You may take antacids if you have heartburn.      Follow Up Appointments:      Follow up with your cardiologist at [facility] of patient preference as instructed.    Follow up with your primary care provider as needed.    Call the clinic if:      You have heartburn that is severe or lasts more than 72 hours.    You have a sore throat that feels worse after 72 hours.    You have shortness of breath, neck pain, chest pain, fever, chills, coughing up blood, or other unusual signs.    Questions or concerns      [facility] [place]:    [phone] [facility] (7 days a week)         Additional Information About Your Visit        [employer] Information     [employer] gives you secure access to your electronic health record. If you see a primary care provider, you can also send messages to your care team and make appointments. If you have questions, please call your primary care clinic.  If you do not have a primary care provider, please call [phone] and they will assist you.        Care EveryWhere ID     This is your Care EveryWhere ID. This could be used by other organizations to access your [facility] medical records  [id number]        Your Vitals Were     Blood Pressure Pulse Temperature Respirations Height Weight    109/80 (BP Location: Left arm) 89 97.8  F (36.6  C) (Oral) 14 1.676 m (5' 6\") 78.3 kg (172 lb 9.6 oz)    Pulse Oximetry BMI (Body Mass Index)                99% 27.86 kg/m2           Primary Care Provider Office Phone # Fax #    Ann Irving [phone]      Thank you!     Thank you for choosing [facility] for your care. Our goal is always to provide you with excellent care. Hearing back from our patients is one way we can continue to " improve our services. Please take a few minutes to complete the written survey that you may receive in the mail after you visit with us. Thank you!             Medication List: This is a list of all your medications and when to take them. Check marks below indicate your daily home schedule. Keep this list as a reference.      Medications           Morning Afternoon Evening Bedtime As Needed    apixaban ANTICOAGULANT 5 MG tablet   Commonly known as:  ELIQUIS   Take 1 tablet (5 mg) by mouth 2 times daily Samples given                                * Elastic Bandage Misc   1 each daily. Use it on the right leg as instructed.                                * JOBST FOR MEN KNEE HIGH/LG Misc   1 each daily Use it on the right leg as instructed                                FISH OIL   None Entered                                flecainide 100 MG tablet   Commonly known as:  TAMBOCOR   Take 1 tablet (100 mg) by mouth 2 times daily                                iron 325 (65 FE) MG tablet   1 TABLET DAILY                                lisinopril 5 MG tablet   Commonly known as:  PRINIVIL/ZESTRIL   TAKE 1 TABLET DAILY                                metoprolol 25 MG 24 hr tablet   Commonly known as:  TOPROL XL   Take 1 tablet (25 mg) by mouth daily                                order for DME   Equipment being ordered: heel lift- MD                                sildenafil 50 MG cap/tab   Commonly known as:  REVATIO/VIAGRA   Take 0.5-1 tablets (25-50 mg) by mouth daily as needed for erectile dysfunction Take 30 min to 4 hours before intercourse.  Never use with nitroglycerin, terazosin or doxazosin.                                STATIN NOT PRESCRIBED (INTENTIONAL)   by Other route continuous prn.                                * Notice:  This list has 2 medication(s) that are the same as other medications prescribed for you. Read the directions carefully, and ask your doctor or other care provider to review them with you.

## 2017-06-05 NOTE — ANESTHESIA CARE TRANSFER NOTE
Patient: Charlene Gutierrez    Procedure(s):  ANESTHESIA NEEDED FOR CARDIOVERSION IN CARE SUITES. (RHETT AT 0830)    Diagnosis: persistant a fib  Diagnosis Additional Information: No value filed.    Anesthesia Type:   No value filed.     Note:  Airway :Nasal Cannula  Patient transferred to:Phase II        Vitals: (Last set prior to Anesthesia Care Transfer)              Electronically Signed By: JOSÉ MIGUEL Yeager CRNA  June 5, 2017  9:31 AM

## 2017-06-05 NOTE — ANESTHESIA PREPROCEDURE EVALUATION
Anesthesia Evaluation     . Pt has had prior anesthetic.     No history of anesthetic complications          ROS/MED HX    ENT/Pulmonary:      (-) sleep apnea   Neurologic:      (-) seizures and CVA   Cardiovascular:     (+) hypertension----. : . . . :. dysrhythmias a-fib, .       METS/Exercise Tolerance:     Hematologic:         Musculoskeletal:         GI/Hepatic:        (-) GERD   Renal/Genitourinary:     (+) chronic renal disease, type: CRI,       Endo:      (-) Type II DM and thyroid disease   Psychiatric:         Infectious Disease:        (-) Recent Fever   Malignancy:         Other:                     Physical Exam  Normal systems: cardiovascular and pulmonary    Airway   Mallampati: II  TM distance: >3 FB  Neck ROM: full    Dental   (+) upper dentures and missing    Cardiovascular       Pulmonary                     Anesthesia Plan      History & Physical Review  History and physical reviewed and following examination; no interval change.    ASA Status:  3 .    NPO Status:  > 8 hours    Plan for General with Propofol induction.          Postoperative Care      Consents  Anesthetic plan, risks, benefits and alternatives discussed with:  Patient..                          .

## 2017-06-05 NOTE — ANESTHESIA POSTPROCEDURE EVALUATION
Patient: Charlene Gutierrez    Procedure(s):  ANESTHESIA NEEDED FOR CARDIOVERSION IN CARE SUITES. (RHETT AT 0830)    Diagnosis:persistant a fib  Diagnosis Additional Information: No value filed.    Anesthesia Type:  General anesthesia     Note:  Anesthesia Post Evaluation    Patient location during evaluation: Bedside  Patient participation: Able to fully participate in evaluation  Level of consciousness: awake and alert  Pain management: satisfactory to patient  Airway patency: patent  Cardiovascular status: hemodynamically stable  Respiratory status: acceptable and unassisted  Hydration status: balanced  PONV: none     Anesthetic complications: None          Last vitals:  Vitals:    06/05/17 0945 06/05/17 1000 06/05/17 1015   BP: 101/78 105/74 101/77   Pulse:      Resp: 14 17 8   Temp:      SpO2: 99% 97% 98%         Electronically Signed By: Haja Adair MD  June 5, 2017  10:20 AM

## 2017-06-05 NOTE — PROGRESS NOTES
Patient for RHETT with cardioversion. States he has been in atrial fib for awhile but not sure when he went into it. He does feel palpitations on occasion. Procedure explained and patient and wife voice understanding.  Await cardiology to discuss and consent.

## 2017-06-06 ENCOUNTER — CARE COORDINATION (OUTPATIENT)
Dept: CARDIOLOGY | Facility: CLINIC | Age: 72
End: 2017-06-06

## 2017-06-06 NOTE — PROGRESS NOTES
Call attempted to reach patient to discuss discharge instructions and confirm future appointment with GILBERTO Cabello on 6/26/17. He is s/p cardioversion on 6/5/17. Left message with call back number.

## 2017-06-08 ENCOUNTER — TELEPHONE (OUTPATIENT)
Dept: CARDIOLOGY | Facility: CLINIC | Age: 72
End: 2017-06-08

## 2017-06-08 NOTE — TELEPHONE ENCOUNTER
Pt called and stated that he did well until yesterday afternoon and he felt that he was back in A Fib. Pt states rates have been from  bpm and his BP's are lower today was 102/73. Pt states that he feels well, but d/t rate control issues, will have pt come in and see Symone tomorrow AM with an EKG. Pt agrees and is able to come in. Arvin

## 2017-06-09 ENCOUNTER — OFFICE VISIT (OUTPATIENT)
Dept: CARDIOLOGY | Facility: CLINIC | Age: 72
End: 2017-06-09
Payer: COMMERCIAL

## 2017-06-09 VITALS
HEIGHT: 65 IN | BODY MASS INDEX: 28.32 KG/M2 | WEIGHT: 170 LBS | DIASTOLIC BLOOD PRESSURE: 78 MMHG | HEART RATE: 84 BPM | SYSTOLIC BLOOD PRESSURE: 118 MMHG

## 2017-06-09 DIAGNOSIS — R07.89 CHEST PRESSURE: Primary | ICD-10-CM

## 2017-06-09 DIAGNOSIS — I48.91 ATRIAL FIBRILLATION, UNSPECIFIED TYPE (H): ICD-10-CM

## 2017-06-09 PROCEDURE — 93000 ELECTROCARDIOGRAM COMPLETE: CPT | Performed by: PHYSICIAN ASSISTANT

## 2017-06-09 PROCEDURE — 99214 OFFICE O/P EST MOD 30 MIN: CPT | Performed by: PHYSICIAN ASSISTANT

## 2017-06-09 RX ORDER — NITROGLYCERIN 0.4 MG/1
TABLET SUBLINGUAL
Qty: 25 TABLET | Refills: 0 | Status: SHIPPED | OUTPATIENT
Start: 2017-06-09

## 2017-06-09 NOTE — PATIENT INSTRUCTIONS
1. Concerned about your episode of chest pressure. We will get a stress test to make sure no blockages are contributing to this. Given your atrial fibrillation and need for metoprolol, will do a chemical stress test (no running).    2. Limit your activities until your stress test. To the ER if bad!    3. STOP flecainide for now but continue Eliquis and metoprolol and lisinopril    4. I sent in a Rx for nitroglycerin. You would use this only for recurrent chest pressure.     5. Once the stress test is back we can decide how best to treat atrial fibrillation. If no blockages seen, will probably increase flecainide 100 mg twice a day and retry the cardioversion. If there are blockages, cannot use flecainide and may have to do testing to see what is going on in the heart vessels.

## 2017-06-09 NOTE — LETTER
6/9/2017    MIKE  DEMPSEY  [facility]   [address] [place] [address]    RE: Charlene Gutierrez       Dear Colleague,    I had the pleasure of seeing Charlene today when he came for concerns regarding atrial fibrillation.  He is a pleasant 71-year-old whose history is well documented in my note from just last week, 06/02/2017.  At that time we set him up for RHETT and DC cardioversion for recurrent persistent atrial fibrillation.  We started him on Eliquis at that time and reviewed his history of hypertension and dyslipidemia and paroxysmal atrial fibrillation dating back to 10/2013.      He underwent DC cardioversion and RHETT with Dr. Joiner on Monday 06/05/2017 and was discharged that same day.  He contacted us on 06/08 noting he felt he was back in atrial fibrillation and he was scheduled to see me today.      Charlene tells me that for the first 2 days post-cardioversion (until 06/07/2017), he felt like he was in sinus rhythm and had no palpitations.  He was tolerating his flecainide 75 mg twice daily, metoprolol 25 mg and Eliquis without problems.      He does describe an episode of chest pressure that lasted for about 5-10 minutes while he was walking, that occurred while he was still in sinus rhythm on 06/07/2017.  He states [place]  He developed chest discomfort along with some mild shortness of breath.  He denies any nausea or diaphoresis associated.  He did not have to stop but he states that it was persistent for about 5-10 minutes.  It dissipated as he continued to walk.  He went home and took a ranitidine.  He has not had any discomfort since.  He states that he has never had that type of discomfort with exertion before.  He could relate it to food intake but states he does have a history of heartburn.      He felt that he went back into atrial fibrillation the evening of 06/07/2017 when he felt palpitations.  He denied any shortness of  breath, associated chest discomfort or lightheadedness associated.      EKG today, which I overread, confirms recurrent atrial fibrillation at 93 beats per minute.  QRS duration was 100 milliseconds on flecainide 75 mg twice daily.     Outpatient Encounter Prescriptions as of 6/9/2017   Medication Sig Dispense Refill     nitroglycerin (NITROSTAT) 0.4 MG sublingual tablet For chest pain place 1 tablet under the tongue every 5 minutes for 3 doses. If symptoms persist 5 minutes after 1st dose call 911. 25 tablet 0     apixaban ANTICOAGULANT (ELIQUIS) 5 MG tablet Take 1 tablet (5 mg) by mouth 2 times daily Samples given       metoprolol (TOPROL XL) 25 MG 24 hr tablet Take 1 tablet (25 mg) by mouth daily       lisinopril (PRINIVIL,ZESTRIL) 5 MG tablet TAKE 1 TABLET DAILY 30 tablet 0     Elastic Bandages & Supports (JOBST FOR MEN KNEE HIGH/LG) MISC 1 each daily Use it on the right leg as instructed 1 each 1     sildenafil (VIAGRA) 50 MG tablet Take 0.5-1 tablets (25-50 mg) by mouth daily as needed for erectile dysfunction Take 30 min to 4 hours before intercourse.  Never use with nitroglycerin, terazosin or doxazosin. 36 tablet 0     ORDER FOR DME Equipment being ordered: heel lift- MD 1 Device 0     Elastic Bandage MISC 1 each daily. Use it on the right leg as instructed. 1 each 1     STATIN NOT PRESCRIBED, INTENTIONAL, by Other route continuous prn.  0     FISH OIL None Entered       IRON 325 (65 FE) MG OR TABS 1 TABLET DAILY       [DISCONTINUED] flecainide (TAMBOCOR) 100 MG tablet Take 1 tablet (100 mg) by mouth 2 times daily (Patient taking differently: Take 100 mg by mouth 2 times daily 75 twice daily) 270 tablet 2     No facility-administered encounter medications on file as of 6/9/2017.       ASSESSMENT AND PLAN:     1.  Chest discomfort.  This is a new sensation for Charlene as he has never had this pressure with exertion before.  I do think that especially given the fact we are using flecainide for antiarrhythmic  therapy, we need to rule out ischemia.  His last stress test in 03/2014 (exercise stress echo) showed no evidence of stress-induced wall motion abnormalities.   RHETT done 06/05/2017 showed an ejection fraction of 55%-60%.     We talked to him about different modalities of stress testing.  I am worried to have him do an exercise stress test given his need for metoprolol in the setting of atrial fibrillation which at times has been quite rapid.  For that reason I have ordered a chemical nuclear stress test and I have asked that he get this done as soon as possible.     I have given him as a prescription for sublingual nitroglycerin with instructions on how to use this.  At this time, he will continue Eliquis and beta blockade.  Due to concerns regarding class 1c antiarrhythmic therapy in the setting of coronary disease, I have had him discontinue flecainide for right now but again have him continue metoprolol.     He is to seek immediate attention and if he gets any significant discomfort and he should decrease in his activity level over the weekend especially as it will be quite hot.  He voiced understanding.      If the stress test is significantly abnormal, I will speak with Dr. Aguayo about potentially proceeding with coronary angiography.  If it is not abnormal; however, I will likely restart flecainide (see below).     2.  Recurrent atrial fibrillation.  Unfortunately, cardioversion only lasted for about 48 hours and he was back in atrial fibrillation the evening of 06/07/2017.  He will discontinue flecainide as above but continue metoprolol succinate 25 mg daily.  I have asked him to continue Eliquis as well and explained the rationale for this.     If his stress test looks good, we will be able to restart flecainide but would be restart it at 100 mg twice daily with plans for DC cardioversion once that has been loaded.  He is generally symptomatic with atrial fibrillation even when he is rate controlled.  He had  previously been on Multaq.  He will see Dr. Aguayo should the increased dose of flecainide and cardioversion not succeed in keeping him in normal rhythm.      It has been a pleasure to see Charlene in clinic.  I will be in touch with him as soon as I see the results of his stress test with further plans for either ischemic workup or atrial fibrillation.     Sincerely,    Jyoti Luna PA-C     [facility]

## 2017-06-09 NOTE — MR AVS SNAPSHOT
After Visit Summary   6/9/2017    Charlene Gutierrez    MRN: [id number]           Patient Information     Date Of Birth          1945        Visit Information        Provider Department      6/9/2017 7:30 AM Jyoti Luna PA-C [facility] HEART AT [place]        Today's Diagnoses     Atrial fibrillation, unspecified type (H)    -  1    Chest pressure          Care Instructions    1. Concerned about your episode of chest pressure. We will get a stress test to make sure no blockages are contributing to this. Given your atrial fibrillation and need for metoprolol, will do a chemical stress test (no running).    2. Limit your activities until your stress test. To the ER if bad!    3. STOP flecainide for now but continue Eliquis and metoprolol and lisinopril    4. I sent in a Rx for nitroglycerin. You would use this only for recurrent chest pressure.     5. Once the stress test is back we can decide how best to treat atrial fibrillation. If no blockages seen, will probably increase flecainide 100 mg twice a day and retry the cardioversion. If there are blockages, cannot use flecainide and may have to do testing to see what is going on in the heart vessels.           Follow-ups after your visit        Your next 10 appointments already scheduled     Jun 26, 2017  1:50 PM CDT   [facility] EP RETURN with Jyoti Luna PA-C   [facility] HEART [facility] ([facility])    [address] [place] [id number] [phone]              Future tests that were ordered for you today     Open Future Orders        Priority Expected Expires Ordered    NM Lexiscan stress test (nuc card) Routine 6/16/2017 6/9/2018 6/9/2017            Who to contact     If you have questions or need follow up information about today's clinic visit or your schedule please contact [facility] HEART [facility] directly at  "[phone].  Normal or non-critical lab and imaging results will be communicated to you by [employer], letter or phone within 4 business days after the clinic has received the results. If you do not hear from us within 7 days, please contact the clinic through [employer] or phone. If you have a critical or abnormal lab result, we will notify you by phone as soon as possible.  Submit refill requests through [employer] or call your pharmacy and they will forward the refill request to us. Please allow 3 business days for your refill to be completed.          Additional Information About Your Visit        [employer] Information     [employer] gives you secure access to your electronic health record. If you see a primary care provider, you can also send messages to your care team and make appointments. If you have questions, please call your primary care clinic.  If you do not have a primary care provider, please call [phone] and they will assist you.        Care EveryWhere ID     This is your Care EveryWhere ID. This could be used by other organizations to access your [facility] medical records  [id number]        Your Vitals Were     Pulse Height BMI (Body Mass Index)             84 1.651 m (5' 5\") 28.29 kg/m2          Blood Pressure from Last 3 Encounters:   06/09/17 118/78   06/05/17 101/77   06/02/17 118/64    Weight from Last 3 Encounters:   06/09/17 77.1 kg (170 lb)   06/05/17 78.3 kg (172 lb 9.6 oz)   06/02/17 78 kg (172 lb)              We Performed the Following     EKG 12-lead complete w/read - Clinics (performed today)          Today's Medication Changes          These changes are accurate as of: 6/9/17  8:00 AM.  If you have any questions, ask your nurse or doctor.               Start taking these medicines.        Dose/Directions    nitroglycerin 0.4 MG sublingual tablet   Commonly known as:  NITROSTAT   Used for:  Chest pressure   Started by:  Jyoti Luna PA-C        For chest pain place 1 tablet " under the tongue every 5 minutes for 3 doses. If symptoms persist 5 minutes after 1st dose call 911.   Quantity:  25 tablet   Refills:  0         Stop taking these medicines if you haven't already. Please contact your care team if you have questions.     flecainide 100 MG tablet   Commonly known as:  TAMBOCOR   Stopped by:  Jyoti Luna PA-C                Where to get your medicines      These medications were sent to [facility] [place] - [address]  [address], [place] [address]     Phone:  [phone]     nitroglycerin 0.4 MG sublingual tablet                Primary Care Provider Office Phone # Fax #    Ann Aristides [phone]       [facility] [address] [place] [address]        Thank you!     Thank you for choosing [facility] AT [place]  for your care. Our goal is always to provide you with excellent care. Hearing back from our patients is one way we can continue to improve our services. Please take a few minutes to complete the written survey that you may receive in the mail after your visit with us. Thank you!             Your Updated Medication List - Protect others around you: Learn how to safely use, store and throw away your medicines at www.disposemymeds.org.          This list is accurate as of: 6/9/17  8:00 AM.  Always use your most recent med list.                   Brand Name Dispense Instructions for use    apixaban ANTICOAGULANT 5 MG tablet    ELIQUIS     Take 1 tablet (5 mg) by mouth 2 times daily Samples given       * Elastic Bandage Misc     1 each    1 each daily. Use it on the right leg as instructed.       * JOBST FOR MEN KNEE HIGH/LG Misc     1 each    1 each daily Use it on the right leg as instructed       FISH OIL      None Entered       iron 325 (65 FE) MG tablet      1 TABLET DAILY       lisinopril 5 MG tablet    PRINIVIL/ZESTRIL    30  tablet    TAKE 1 TABLET DAILY       metoprolol 25 MG 24 hr tablet    TOPROL XL     Take 1 tablet (25 mg) by mouth daily       nitroglycerin 0.4 MG sublingual tablet    NITROSTAT    25 tablet    For chest pain place 1 tablet under the tongue every 5 minutes for 3 doses. If symptoms persist 5 minutes after 1st dose call 911.       order for DME     1 Device    Equipment being ordered: heel lift- MD       sildenafil 50 MG cap/tab    REVATIO/VIAGRA    36 tablet    Take 0.5-1 tablets (25-50 mg) by mouth daily as needed for erectile dysfunction Take 30 min to 4 hours before intercourse.  Never use with nitroglycerin, terazosin or doxazosin.       STATIN NOT PRESCRIBED (INTENTIONAL)      by Other route continuous prn.       * Notice:  This list has 2 medication(s) that are the same as other medications prescribed for you. Read the directions carefully, and ask your doctor or other care provider to review them with you.

## 2017-06-09 NOTE — PROGRESS NOTES
HPI and Plan:   See dictation #[id number]    Orders Placed This Encounter   Procedures     NM Lexiscan stress test (nuc card)     EKG 12-lead complete w/read - Clinics (performed today)       Orders Placed This Encounter   Medications     nitroglycerin (NITROSTAT) 0.4 MG sublingual tablet     Sig: For chest pain place 1 tablet under the tongue every 5 minutes for 3 doses. If symptoms persist 5 minutes after 1st dose call 911.     Dispense:  25 tablet     Refill:  0       Medications Discontinued During This Encounter   Medication Reason     flecainide (TAMBOCOR) 100 MG tablet        Encounter Diagnoses   Name Primary?     Atrial fibrillation, unspecified type (H)      Chest pressure Yes       CURRENT MEDICATIONS:  Current Outpatient Prescriptions   Medication Sig Dispense Refill     nitroglycerin (NITROSTAT) 0.4 MG sublingual tablet For chest pain place 1 tablet under the tongue every 5 minutes for 3 doses. If symptoms persist 5 minutes after 1st dose call 911. 25 tablet 0     apixaban ANTICOAGULANT (ELIQUIS) 5 MG tablet Take 1 tablet (5 mg) by mouth 2 times daily Samples given       metoprolol (TOPROL XL) 25 MG 24 hr tablet Take 1 tablet (25 mg) by mouth daily       lisinopril (PRINIVIL,ZESTRIL) 5 MG tablet TAKE 1 TABLET DAILY 30 tablet 0     Elastic Bandages & Supports (JOBST FOR MEN KNEE HIGH/LG) MISC 1 each daily Use it on the right leg as instructed 1 each 1     sildenafil (VIAGRA) 50 MG tablet Take 0.5-1 tablets (25-50 mg) by mouth daily as needed for erectile dysfunction Take 30 min to 4 hours before intercourse.  Never use with nitroglycerin, terazosin or doxazosin. 36 tablet 0     ORDER FOR DME Equipment being ordered: heel lift- MD 1 Device 0     Elastic Bandage MISC 1 each daily. Use it on the right leg as instructed. 1 each 1     STATIN NOT PRESCRIBED, INTENTIONAL, by Other route continuous prn.  0     FISH OIL None Entered       IRON 325 (65 FE) MG OR TABS 1 TABLET DAILY         ALLERGIES   No Known  Allergies    PAST MEDICAL HISTORY:  Past Medical History:   Diagnosis Date     Atrial fibrillation (H) 11/17/2013     CKD (chronic kidney disease) 2012     Gastric ulcer      HTN (hypertension), benign      Hyperlipidaemia        PAST SURGICAL HISTORY:  Past Surgical History:   Procedure Laterality Date     ANESTHESIA CARDIOVERSION N/A 6/5/2017    Procedure: ANESTHESIA CARDIOVERSION;  ANESTHESIA NEEDED FOR CARDIOVERSION IN CARE SUITES. (RHETT AT 0830);  Surgeon: GENERIC ANESTHESIA PROVIDER;  Location: [facility] OR     BRONCHOSCOPY       C INCISION OF PYLORIC MUSCLE       CARDIOVERSION  11/2013     COLONOSCOPY       EXTRACAPSULAR CATARACT EXTRATION WITH INTRAOCULAR LENS IMPLANT  2003    right eye       FAMILY HISTORY:  Family History   Problem Relation Age of Onset     CANCER Mother      CANCER Father      Prostate Cancer No family hx of      Colon Cancer Father        SOCIAL HISTORY:  Social History     Social History     Marital status:      Spouse name: N/A     Number of children: N/A     Years of education: N/A     Social History Main Topics     Smoking status: Never Smoker     Smokeless tobacco: Never Used     Alcohol use Yes      Comment: ocass     Drug use: No     Sexual activity: Yes     Partners: Female     Other Topics Concern     Parent/Sibling W/ Cabg, Mi Or Angioplasty Before 65f 55m? No      Service Yes     Blood Transfusions No     Caffeine Concern No     Occupational Exposure No     Hobby Hazards No     Sleep Concern No     Stress Concern No     Weight Concern No     Special Diet No     Back Care No     Exercise Yes     Bike Helmet No     Seat Belt Yes     Self-Exams No     Social History Narrative       Review of Systems:  Skin:  Negative       Eyes:  Positive for glasses    ENT:  Negative      Respiratory:  Positive for shortness of breath;dyspnea on exertion     Cardiovascular:  Negative for;edema;syncope or near-syncope;exercise intolerance;fatigue;lightheadedness;dizziness Positive  "for;chest pain;palpitations    Gastroenterology: Negative for melena;hematochezia    Genitourinary:  Negative      Musculoskeletal:  Positive for arthritis    Neurologic:  Negative      Psychiatric:  Negative      Heme/Lymph/Imm:  Negative      Endocrine:  Negative        Physical Exam:  Vitals: /78  Pulse 84  Ht 1.651 m (5' 5\")  Wt 77.1 kg (170 lb)  BMI 28.29 kg/m2    Constitutional:  cooperative, alert and oriented, well developed, well nourished, in no acute distress        Skin:  warm and dry to the touch, no apparent skin lesions or masses noted        Head:  normocephalic, no masses or lesions        Eyes:  conjunctivae and lids unremarkable;sclera white;pupils equal and round;no xanthalasma        ENT:  no pallor or cyanosis, dentition good        Neck:  JVP normal;no carotid bruit        Chest:  normal breath sounds, clear to auscultation, normal A-P diameter, normal symmetry, normal respiratory excursion, no use of accessory muscles          Cardiac:   irregular rhythm                Abdomen:  abdomen soft        Vascular: pulses full and equal                                        Extremities and Back:  no deformities, clubbing, cyanosis, erythema observed;no edema              Neurological:  affect appropriate, oriented to time, person and place                      "

## 2017-06-09 NOTE — PROGRESS NOTES
HISTORY OF PRESENT ILLNESS:  I had the pleasure of seeing Charlene today when he came for concerns regarding atrial fibrillation.  He is a pleasant 71-year-old whose history is well documented in my note from just last week, 06/02/2017.  At that time we set him up for RHETT and DC cardioversion for recurrent persistent atrial fibrillation.  We started him on Eliquis at that time and reviewed his history of hypertension and dyslipidemia and paroxysmal atrial fibrillation dating back to 10/2013.      He underwent DC cardioversion and RHETT with Dr. Joiner on Monday 06/05/2017 and was discharged that same day.  He contacted us on 06/08 noting he felt he was back in atrial fibrillation and he was scheduled to see me today.      Charlene tells me that for the first 2 days post-cardioversion (until 06/07/2017), he felt like he was in sinus rhythm and had no palpitations.  He was tolerating his flecainide 75 mg twice daily, metoprolol 25 mg and Eliquis without problems.      He does describe an episode of chest pressure that lasted for about 5-10 minutes while he was walking, that occurred while he was still in sinus rhythm on 06/07/2017.  He [place]  He developed chest discomfort along with some mild shortness of breath.  He denies any nausea or diaphoresis associated.  He did not have to stop but he states that it was persistent for about 5-10 minutes.  It dissipated as he continued to walk.  He went home and took a ranitidine.  He has not had any discomfort since.  He states that he has never had that type of discomfort with exertion before.  He could relate it to food intake but states he does have a history of heartburn.      He felt that he went back into atrial fibrillation the evening of 06/07/2017 when he felt palpitations.  He denied any shortness of breath, associated chest discomfort or lightheadedness associated.      EKG today, which I overread, confirms recurrent  atrial fibrillation at 93 beats per minute.  QRS duration was 100 milliseconds on flecainide 75 mg twice daily.        ASSESSMENT AND PLAN:     1.  Chest discomfort.  This is a new sensation for Charlene as he has never had this pressure with exertion before.  I do think that especially given the fact we are using flecainide for antiarrhythmic therapy, we need to rule out ischemia.  His last stress test in 03/2014 (exercise stress echo) showed no evidence of stress-induced wall motion abnormalities.   RHETT done 06/05/2017 showed an ejection fraction of 55%-60%.     We talked to him about different modalities of stress testing.  I am worried to have him do an exercise stress test given his need for metoprolol in the setting of atrial fibrillation which at times has been quite rapid.  For that reason I have ordered a chemical nuclear stress test and I have asked that he get this done as soon as possible.     I have given him as a prescription for sublingual nitroglycerin with instructions on how to use this.  At this time, he will continue Eliquis and beta blockade.  Due to concerns regarding class 1c antiarrhythmic therapy in the setting of coronary disease, I have had him discontinue flecainide for right now but again have him continue metoprolol.     He is to seek immediate attention and if he gets any significant discomfort and he should decrease in his activity level over the weekend especially as it will be quite hot.  He voiced understanding.      If the stress test is significantly abnormal, I will speak with Dr. Aguayo about potentially proceeding with coronary angiography.  If it is not abnormal; however, I will likely restart flecainide (see below).     2.  Recurrent atrial fibrillation.  Unfortunately, cardioversion only lasted for about 48 hours and he was back in atrial fibrillation the evening of 06/07/2017.  He will discontinue flecainide as above but continue metoprolol succinate 25 mg daily.  I have  asked him to continue Eliquis as well and explained the rationale for this.     If his stress test looks good, we will be able to restart flecainide but would be restart it at 100 mg twice daily with plans for DC cardioversion once that has been loaded.  He is generally symptomatic with atrial fibrillation even when he is rate controlled.  He had previously been on Multaq.  He will see Dr. Aguayo should the increased dose of flecainide and cardioversion not succeed in keeping him in normal rhythm.      It has been a pleasure to see Charlene in clinic.  I will be in touch with him as soon as I see the results of his stress test with further plans for either ischemic workup or atrial fibrillation.         DANIELA BRYAN PA-C             D: 2017 08:28   T: 2017 15:20   MT: NASH      Name:     CHARLENE MERINO   MRN:      [id number]        Account:      [id number]   :      1945           Service Date: 2017      Document: [id number]

## 2017-06-12 ENCOUNTER — HOSPITAL ENCOUNTER (OUTPATIENT)
Dept: CARDIOLOGY | Facility: CLINIC | Age: 72
Discharge: HOME | End: 2017-06-12
Attending: PHYSICIAN ASSISTANT | Admitting: PHYSICIAN ASSISTANT
Payer: COMMERCIAL

## 2017-06-12 ENCOUNTER — DOCUMENTATION ONLY (OUTPATIENT)
Dept: CARDIOLOGY | Facility: CLINIC | Age: 72
End: 2017-06-12

## 2017-06-12 VITALS — HEART RATE: 81 BPM | SYSTOLIC BLOOD PRESSURE: 120 MMHG | DIASTOLIC BLOOD PRESSURE: 82 MMHG

## 2017-06-12 DIAGNOSIS — R07.89 CHEST PRESSURE: ICD-10-CM

## 2017-06-12 DIAGNOSIS — I48.91 ATRIAL FIBRILLATION, UNSPECIFIED TYPE (H): ICD-10-CM

## 2017-06-12 DIAGNOSIS — I48.19 PERSISTENT ATRIAL FIBRILLATION (H): Primary | ICD-10-CM

## 2017-06-12 PROCEDURE — 34300033 ZZH RX 343: Performed by: PHYSICIAN ASSISTANT

## 2017-06-12 PROCEDURE — 78452 HT MUSCLE IMAGE SPECT MULT: CPT | Mod: 26 | Performed by: INTERNAL MEDICINE

## 2017-06-12 PROCEDURE — 93017 CV STRESS TEST TRACING ONLY: CPT

## 2017-06-12 PROCEDURE — 93018 CV STRESS TEST I&R ONLY: CPT | Performed by: INTERNAL MEDICINE

## 2017-06-12 PROCEDURE — 25000128 H RX IP 250 OP 636: Performed by: INTERNAL MEDICINE

## 2017-06-12 PROCEDURE — A9502 TC99M TETROFOSMIN: HCPCS | Performed by: PHYSICIAN ASSISTANT

## 2017-06-12 PROCEDURE — 93016 CV STRESS TEST SUPVJ ONLY: CPT | Performed by: INTERNAL MEDICINE

## 2017-06-12 RX ORDER — ALBUTEROL SULFATE 90 UG/1
2 AEROSOL, METERED RESPIRATORY (INHALATION) EVERY 5 MIN PRN
Status: DISCONTINUED | OUTPATIENT
Start: 2017-06-12 | End: 2017-06-13 | Stop reason: HOSPADM

## 2017-06-12 RX ORDER — REGADENOSON 0.08 MG/ML
0.4 INJECTION, SOLUTION INTRAVENOUS ONCE
Status: COMPLETED | OUTPATIENT
Start: 2017-06-12 | End: 2017-06-12

## 2017-06-12 RX ORDER — ACYCLOVIR 200 MG/1
0-1 CAPSULE ORAL
Status: DISCONTINUED | OUTPATIENT
Start: 2017-06-12 | End: 2017-06-13 | Stop reason: HOSPADM

## 2017-06-12 RX ORDER — AMINOPHYLLINE 25 MG/ML
50-100 INJECTION, SOLUTION INTRAVENOUS
Status: DISCONTINUED | OUTPATIENT
Start: 2017-06-12 | End: 2017-06-13 | Stop reason: HOSPADM

## 2017-06-12 RX ADMIN — TETROFOSMIN 9.31 MCI.: 1.38 INJECTION, POWDER, LYOPHILIZED, FOR SOLUTION INTRAVENOUS at 13:15

## 2017-06-12 RX ADMIN — TETROFOSMIN 3.38 MCI.: 1.38 INJECTION, POWDER, LYOPHILIZED, FOR SOLUTION INTRAVENOUS at 11:38

## 2017-06-12 RX ADMIN — REGADENOSON 0.4 MG: 0.08 INJECTION, SOLUTION INTRAVENOUS at 13:15

## 2017-06-12 NOTE — PROGRESS NOTES
Pls let pt know that stress done obtained for c/o chest pressure while walking last week, looked fine without evidence of ischemia. Normal EF.  In AFib during test.    Pls have him restart flecainide but increase to 100 mg BID (previously on 75 mg bid) and get DCCV. Does not need RHETT as long as has been on Eliquis without misses since his DCCV done 6/5. This was started 6/2 and RHETT while on it showed no clot.    I did an H/P for him 6/2 so does not need to come in and see me first unless he has questions/concerns.

## 2017-06-13 NOTE — PROGRESS NOTES
LM for pt to call back to discuss stress test and med recommendation and DCCV. Order in place for Cardioversion. Arvin

## 2017-06-15 RX ORDER — FLECAINIDE ACETATE 100 MG/1
100 TABLET ORAL 2 TIMES DAILY
COMMUNITY
Start: 2017-06-15 | End: 2017-07-13

## 2017-06-15 NOTE — PROGRESS NOTES
Left second message for pt to call back. Arvin** pt returned call and made aware to start taking Flecainide 100 mg bid and then will have Precious arrange Cardioversion for pt. Pt reminded to not miss a dose of Eliquis or Cardioversion will be cancelled. Pt stated understanding. Pt had Flecainide 100 mg available, but will call if needing more. Arvin

## 2017-06-20 ENCOUNTER — HOSPITAL ENCOUNTER (OUTPATIENT)
Facility: CLINIC | Age: 72
Discharge: HOME | End: 2017-06-20
Attending: INTERNAL MEDICINE | Admitting: INTERNAL MEDICINE
Payer: COMMERCIAL

## 2017-06-20 ENCOUNTER — HOSPITAL ENCOUNTER (OUTPATIENT)
Dept: SURGERY | Facility: CLINIC | Age: 72
End: 2017-06-20
Attending: PHYSICIAN ASSISTANT | Admitting: INTERNAL MEDICINE
Payer: COMMERCIAL

## 2017-06-20 ENCOUNTER — ANESTHESIA (OUTPATIENT)
Dept: SURGERY | Facility: CLINIC | Age: 72
End: 2017-06-20
Payer: COMMERCIAL

## 2017-06-20 ENCOUNTER — ANESTHESIA EVENT (OUTPATIENT)
Dept: SURGERY | Facility: CLINIC | Age: 72
End: 2017-06-20
Payer: COMMERCIAL

## 2017-06-20 VITALS
RESPIRATION RATE: 20 BRPM | DIASTOLIC BLOOD PRESSURE: 83 MMHG | SYSTOLIC BLOOD PRESSURE: 115 MMHG | TEMPERATURE: 98 F | OXYGEN SATURATION: 96 %

## 2017-06-20 DIAGNOSIS — I48.19 PERSISTENT ATRIAL FIBRILLATION (H): ICD-10-CM

## 2017-06-20 LAB
MAGNESIUM SERPL-MCNC: 2 MG/DL (ref 1.6–2.3)
POTASSIUM SERPL-SCNC: 4.2 MMOL/L (ref 3.4–5.3)

## 2017-06-20 PROCEDURE — 37000008 ZZH ANESTHESIA TECHNICAL FEE, 1ST 30 MIN

## 2017-06-20 PROCEDURE — 92960 CARDIOVERSION ELECTRIC EXT: CPT

## 2017-06-20 PROCEDURE — 84132 ASSAY OF SERUM POTASSIUM: CPT | Performed by: INTERNAL MEDICINE

## 2017-06-20 PROCEDURE — 92960 CARDIOVERSION ELECTRIC EXT: CPT | Performed by: INTERNAL MEDICINE

## 2017-06-20 PROCEDURE — 93005 ELECTROCARDIOGRAM TRACING: CPT

## 2017-06-20 PROCEDURE — 83735 ASSAY OF MAGNESIUM: CPT | Performed by: INTERNAL MEDICINE

## 2017-06-20 PROCEDURE — 36415 COLL VENOUS BLD VENIPUNCTURE: CPT | Performed by: INTERNAL MEDICINE

## 2017-06-20 PROCEDURE — 40000010 ZZH STATISTIC ANES STAT CODE-CRNA PER MINUTE

## 2017-06-20 PROCEDURE — 40000065 ZZH STATISTIC EKG NON-CHARGEABLE

## 2017-06-20 PROCEDURE — 25000125 ZZHC RX 250: Performed by: NURSE ANESTHETIST, CERTIFIED REGISTERED

## 2017-06-20 PROCEDURE — 93010 ELECTROCARDIOGRAM REPORT: CPT | Performed by: INTERNAL MEDICINE

## 2017-06-20 PROCEDURE — 25000128 H RX IP 250 OP 636: Performed by: NURSE ANESTHETIST, CERTIFIED REGISTERED

## 2017-06-20 RX ORDER — SODIUM CHLORIDE 9 MG/ML
INJECTION, SOLUTION INTRAVENOUS CONTINUOUS
Status: DISCONTINUED | OUTPATIENT
Start: 2017-06-20 | End: 2017-06-21 | Stop reason: HOSPADM

## 2017-06-20 RX ORDER — POTASSIUM CHLORIDE 1500 MG/1
20 TABLET, EXTENDED RELEASE ORAL
Status: DISCONTINUED | OUTPATIENT
Start: 2017-06-20 | End: 2017-06-21 | Stop reason: HOSPADM

## 2017-06-20 RX ORDER — POTASSIUM CHLORIDE 1500 MG/1
40 TABLET, EXTENDED RELEASE ORAL
Status: DISCONTINUED | OUTPATIENT
Start: 2017-06-20 | End: 2017-06-21 | Stop reason: HOSPADM

## 2017-06-20 RX ORDER — SODIUM CHLORIDE, SODIUM LACTATE, POTASSIUM CHLORIDE, CALCIUM CHLORIDE 600; 310; 30; 20 MG/100ML; MG/100ML; MG/100ML; MG/100ML
INJECTION, SOLUTION INTRAVENOUS CONTINUOUS PRN
Status: DISCONTINUED | OUTPATIENT
Start: 2017-06-20 | End: 2017-06-20

## 2017-06-20 RX ORDER — PROPOFOL 10 MG/ML
INJECTION, EMULSION INTRAVENOUS PRN
Status: DISCONTINUED | OUTPATIENT
Start: 2017-06-20 | End: 2017-06-20

## 2017-06-20 RX ADMIN — PROPOFOL 80 MG: 10 INJECTION, EMULSION INTRAVENOUS at 10:29

## 2017-06-20 RX ADMIN — SODIUM CHLORIDE, POTASSIUM CHLORIDE, SODIUM LACTATE AND CALCIUM CHLORIDE: 600; 310; 30; 20 INJECTION, SOLUTION INTRAVENOUS at 10:27

## 2017-06-20 ASSESSMENT — ENCOUNTER SYMPTOMS
DYSRHYTHMIAS: 1
SEIZURES: 0

## 2017-06-20 NOTE — IP AVS SNAPSHOT
[place] [address]    [place] [address]    Phone:  [phone]                                       After Visit Summary   6/20/2017    Charlene Gutierrez    MRN: [id number]           After Visit Summary Signature Page     I have received my discharge instructions, and my questions have been answered. I have discussed any challenges I see with this plan with the nurse or doctor.    ..........................................................................................................................................  Patient/Patient Representative Signature      ..........................................................................................................................................  Patient Representative Print Name and Relationship to Patient    ..................................................               ................................................  Date                                            Time    ..........................................................................................................................................  Reviewed by Signature/Title    ...................................................              ..............................................  Date                                                            Time

## 2017-06-20 NOTE — IP AVS SNAPSHOT
MRN:[id number]                      After Visit Summary   6/20/2017    Charlene Gutierrez    MRN: [id number]           Thank you!     Thank you for choosing [facility] for your care. Our goal is always to provide you with excellent care. Hearing back from our patients is one way we can continue to improve our services. Please take a few minutes to complete the written survey that you may receive in the mail after you visit with us. Thank you!        Patient Information     Date Of Birth          1945        About your hospital stay     You were admitted on:  June 20, 2017 You last received care in the:  [facility] PACU    You were discharged on:  June 20, 2017       Who to Call     For medical emergencies, please call 911.  For non-urgent questions about your medical care, please call your primary care provider or clinic, [phone]          Attending Provider     Provider Specialty    Jyoti Luna PA-C Physician Assistant       Primary Care Provider Office Phone # Fax #    Ann Irving [phone]      Your next 10 appointments already scheduled     Jul 06, 2017  2:30 PM CDT   [facility] EP RETURN with Jyoti Luna PA-C   [facility] [place] ([facility])    [address] [place] [id number] [phone]              Further instructions from your care team             Pending Results     No orders found from 6/18/2017 to 6/21/2017.            Admission Information     Date & Time Provider Department Dept. Phone    6/20/2017 Jyoti Luna PA-C [facility] PACU [phone]      Your Vitals Were     Blood Pressure Temperature Respirations Pulse Oximetry          122/92 98  F (36.7  C) 16 95%        MyChart Information     [facility] gives you secure access to your electronic health record. If you see a primary care provider, you can also send messages to your care team and  make appointments. If you have questions, please call your primary care clinic.  If you do not have a primary care provider, please call [phone] and they will assist you.        Care EveryWhere ID     This is your Care EveryWhere ID. This could be used by other organizations to access your [facility] medical records  [id number]           Review of your medicines      Notice     This visit is during an admission. Changes to the med list made in this visit will be reflected in the After Visit Summary of the admission.             Protect others around you: Learn how to safely use, store and throw away your medicines at www.disposemymeds.org.             Medication List: This is a list of all your medications and when to take them. Check marks below indicate your daily home schedule. Keep this list as a reference.      Notice     This visit is during an admission. Changes to the med list made in this visit will be reflected in the After Visit Summary of the admission.              More Information        Discharge Instructions for Cardioversion  Your doctor performed a procedure called cardioversion. Your doctor used a controlled electric shock or a medication to briefly stop all electrical activity in your heart. This helped restore your heart s normal rhythm. Here are some instructions to follow while you recover.  Home care    Because cardioversion typically requires sedation, you won't be able to drive home. You will need a ride.    Don t be alarmed if the skin on your chest is wrinkled or feels like it is sunburned. Your doctor may prescribe a soothing lotion to relieve this discomfort. These minor symptoms will go away in a few days.    Ask your doctor about medications to keep your heart rhythm steady.    If you were prescribed medication, take it as instructed by your doctor. Don t skip doses or take double doses. Cardioversion requires blood thinners to prevent a delayed risk of stroke. Be sure you  discuss which medication you are taking to prevent stroke. Ask when you need to have your medication levels checked, and whether you may be able to stop taking it in the future or whether it is recommended that you take it for life.    Learn to take your own pulse. Keep a record of your results. Ask your doctor when you should seek emergency medical attention. He or she will tell you which pulse rate reading is dangerous.     Keep in mind this procedure may need to be repeated at a later date. After the procedure, your doctor will tell you if the treatment worked or if you will need further treatments or medication.     Follow-up care  Make a follow-up appointment as directed by our staff.     When to seek medical care  Call 911 right away if you have:    Chest pain.    Shortness of breath.  Otherwise, call your doctor immediately if you have:    Fainting    Chest pain with increased activity    Irregular heartbeat or fast pulse   Date Last Reviewed: 9/22/2014 2000-2017 [employer]. [address] [place] [address]. All rights reserved. This information is not intended as a substitute for professional medical care. Always follow your healthcare professional's instructions.                Anesthesia: Monitored Anesthesia Care (MAC)  You re due to have surgery. During surgery, you ll be given medicine called anesthesia. This will keep you comfortable and pain-free. Your surgeon will use monitored anesthesia care (MAC). This sheet tells you more about this type of anesthesia.    What is monitored anesthesia care?  MAC keeps you very drowsy during surgery. You may be awake, but you will likely not remember much. And you won t feel pain. With MAC, medicines are given through an IV line into a vein in your arm or hand. A local anesthetic will usually be injected into the skin and muscle around the surgical site to numb it. The anesthesia provider monitors you during the procedure. He or she  checks your heart rate and rhythm, blood pressure, and blood oxygen level.  Anesthesia tools and medicines that may be near you during your procedure  You will likely have:    A pulse oximeter on the end of your finger. This measures your blood oxygen level.    Electrocardiography leads (electrodes) on your chest. These record your heart rate and rhythm.    Medicines given through an IV. These relax you and prevent pain. You may be awake or sleep lightly. If you have local anesthetic, it is injected directly into your skin.    A facemask to give you oxygen, if needed.  Risks and possible complications  MAC has some risks. These include:    Breathing problems    Nausea and vomiting    Allergic reaction to the anesthetic    Anesthesia safety  Tips for anesthesia safety include the following:     Follow all instructions you are given for how long not to eat or drink before your procedure.    Be sure your healthcare provider knows what medicines you take, especially any anti-inflammatory medicine or blood thinners. This includes aspirin and any other over-the-counter medicines, herbs, and supplements.    Have an adult family member or friend drive you home after the procedure.    For the first 24 hours after your surgery:    Do not drive or use heavy equipment.    Do not make important decisions or sign documents.    Avoid alcohol.    Have someone stay with you, if possible. They can watch for problems and help keep you safe.  Date Last Reviewed: 12/1/2016 2000-2017 [employer]. [address] [place] [address], [place] [address]. All rights reserved. This information is not intended as a substitute for professional medical care. Always follow your healthcare professional's instructions.

## 2017-06-20 NOTE — ANESTHESIA CARE TRANSFER NOTE
Patient: Charlene Gutierrez    * No procedures listed *    Diagnosis: * No pre-op diagnosis entered *  Diagnosis Additional Information: No value filed.    Anesthesia Type:   No value filed.     Note:  Airway :Nasal Cannula  Patient transferred to:PACU        Vitals: (Last set prior to Anesthesia Care Transfer)    CRNA VITALS  6/20/2017 1005 - 6/20/2017 1035      6/20/2017             NIBP: 129/89    Pulse: 70    NIBP Mean: 99    SpO2: 96 %    Resp Rate (observed): 19    Resp Rate (set):                 Electronically Signed By: JOSÉ MIGUEL Childers CRNA  June 20, 2017  10:35 AM

## 2017-06-20 NOTE — ANESTHESIA PREPROCEDURE EVALUATION
Anesthesia Evaluation     . Pt has had prior anesthetic.     No history of anesthetic complications          ROS/MED HX    ENT/Pulmonary:      (-) sleep apnea   Neurologic:      (-) seizures and CVA   Cardiovascular:     (+) hypertension----. : . . . :. dysrhythmias a-fib, .       METS/Exercise Tolerance:     Hematologic:         Musculoskeletal:         GI/Hepatic:        (-) GERD   Renal/Genitourinary:     (+) chronic renal disease, type: CRI,       Endo:      (-) Type II DM and thyroid disease   Psychiatric:         Infectious Disease:        (-) Recent Fever   Malignancy:         Other:                     Physical Exam  Normal systems: pulmonary    Airway   Mallampati: II  TM distance: >3 FB  Neck ROM: full    Dental   (+) upper dentures and lower dentures    Cardiovascular   Rhythm and rate: irregular and normal      Pulmonary                     Anesthesia Plan      History & Physical Review  History and physical reviewed and following examination; no interval change.    ASA Status:  3 .    NPO Status:  > 8 hours    Plan for General          Postoperative Care      Consents  Anesthetic plan, risks, benefits and alternatives discussed with:  Patient..                          .

## 2017-06-20 NOTE — ANESTHESIA POSTPROCEDURE EVALUATION
Patient: Charlene Gutierrez    * No procedures listed *    Diagnosis:* No pre-op diagnosis entered *  Diagnosis Additional Information: No value filed.    Anesthesia Type:  General    Note:  Anesthesia Post Evaluation    Patient location during evaluation: PACU  Patient participation: Able to fully participate in evaluation  Level of consciousness: awake and alert  Pain management: adequate  Cardiovascular status: acceptable  Respiratory status: acceptable  Hydration status: acceptable  PONV: none     Anesthetic complications: None          Last vitals:  Vitals:    06/20/17 1045 06/20/17 1050 06/20/17 1055   BP: 121/85 124/83 115/83   Resp: 20 16 20   Temp:      SpO2:   96%         Electronically Signed By: GUDELIA MCGUIRE  June 20, 2017  12:05 PM

## 2017-06-20 NOTE — PROCEDURES
Informed consent was obtained and compliance with eliquis was verified. After adequate sedation was provided by anesthesia, NSR was restored with a single synchronized shock at 200 joules. There were no complications.

## 2017-06-23 ENCOUNTER — TELEPHONE (OUTPATIENT)
Dept: CARDIOLOGY | Facility: CLINIC | Age: 72
End: 2017-06-23

## 2017-06-23 NOTE — TELEPHONE ENCOUNTER
Pt made aware that Symone does recommend f/u with Dr Aguayo. Pt will see Dr Aguayo on 6/26 at 1115 with an EKG. JNelsonRN

## 2017-06-23 NOTE — TELEPHONE ENCOUNTER
Pt called and stated that he was back in A fib yesterday afternoon. Pt feels good, HR was 80-90 and -120 systolic. Per Symone Luna last OV note if pt goes back into A Fib, will have pt f/u with Dr Aguayo. Will discuss with Symone. Arvin

## 2017-06-26 ENCOUNTER — OFFICE VISIT (OUTPATIENT)
Dept: CARDIOLOGY | Facility: CLINIC | Age: 72
End: 2017-06-26
Payer: COMMERCIAL

## 2017-06-26 VITALS
BODY MASS INDEX: 28.76 KG/M2 | DIASTOLIC BLOOD PRESSURE: 72 MMHG | WEIGHT: 172.6 LBS | HEART RATE: 92 BPM | SYSTOLIC BLOOD PRESSURE: 108 MMHG | HEIGHT: 65 IN

## 2017-06-26 DIAGNOSIS — I10 ESSENTIAL HYPERTENSION WITH GOAL BLOOD PRESSURE LESS THAN 140/90: Primary | ICD-10-CM

## 2017-06-26 DIAGNOSIS — I48.19 PERSISTENT ATRIAL FIBRILLATION (H): ICD-10-CM

## 2017-06-26 PROCEDURE — 93000 ELECTROCARDIOGRAM COMPLETE: CPT | Performed by: INTERNAL MEDICINE

## 2017-06-26 PROCEDURE — 99215 OFFICE O/P EST HI 40 MIN: CPT | Performed by: INTERNAL MEDICINE

## 2017-06-26 NOTE — PROGRESS NOTES
"HPI and Plan:   See dictation  202010    Physical Exam:  Vitals: /72 (BP Location: Right arm, Cuff Size: Adult Large)  Pulse 92  Ht 1.651 m (5' 5\")  Wt 78.3 kg (172 lb 9.6 oz)  BMI 28.72 kg/m2    Constitutional:  AAO x3.  Pt is in NAD.  HEAD: normocephalic.  SKIN: Skin normal color, texture and turgor with no lesions or eruptions.  Eyes: PERRL, EOMI.  ENT:  Supple, normal JVP. No lymphadenopathy or thyroid enlargement.  Chest:  CTAB.  Cardiac: RRR, normal  S1 and S2.  No murmurs rubs or gallop.   Abdomen:  Normal BS.  Soft, non-tender and non-distended.  No rebound or guarding.    Extremities:  Pedious pulses palpable B/L.  No LE edema noticed.   Neurological: Strength and sensation grossly symmetric and intact throughout.       CURRENT MEDICATIONS:  Current Outpatient Prescriptions   Medication Sig Dispense Refill     flecainide (TAMBOCOR) 100 MG tablet Take 1 tablet (100 mg) by mouth 2 times daily       nitroglycerin (NITROSTAT) 0.4 MG sublingual tablet For chest pain place 1 tablet under the tongue every 5 minutes for 3 doses. If symptoms persist 5 minutes after 1st dose call 911. 25 tablet 0     apixaban ANTICOAGULANT (ELIQUIS) 5 MG tablet Take 1 tablet (5 mg) by mouth 2 times daily Samples given       metoprolol (TOPROL XL) 25 MG 24 hr tablet Take 1 tablet (25 mg) by mouth daily       Elastic Bandages & Supports (JOBST FOR MEN KNEE HIGH/LG) MISC 1 each daily Use it on the right leg as instructed 1 each 1     sildenafil (VIAGRA) 50 MG tablet Take 0.5-1 tablets (25-50 mg) by mouth daily as needed for erectile dysfunction Take 30 min to 4 hours before intercourse.  Never use with nitroglycerin, terazosin or doxazosin. 36 tablet 0     ORDER FOR DME Equipment being ordered: heel lift- MD 1 Device 0     Elastic Bandage MISC 1 each daily. Use it on the right leg as instructed. 1 each 1     FISH OIL one capsule daily       IRON 325 (65 FE) MG OR TABS 1 TABLET DAILY       STATIN NOT PRESCRIBED, INTENTIONAL, by " Other route continuous prn.  0       ALLERGIES   No Known Allergies    PAST MEDICAL HISTORY:  Past Medical History:   Diagnosis Date     Atrial fibrillation (H) 11/17/2013     CKD (chronic kidney disease) 2012     Gastric ulcer      HTN (hypertension), benign      Hyperlipidaemia        PAST SURGICAL HISTORY:  Past Surgical History:   Procedure Laterality Date     ANESTHESIA CARDIOVERSION N/A 6/5/2017    Procedure: ANESTHESIA CARDIOVERSION;  ANESTHESIA NEEDED FOR CARDIOVERSION IN CARE SUITES. (RHETT AT 0830);  Surgeon: GENERIC ANESTHESIA PROVIDER;  Location: [facility]     BRONCHOSCOPY       C INCISION OF PYLORIC MUSCLE       CARDIOVERSION  11/2013     COLONOSCOPY       EXTRACAPSULAR CATARACT EXTRATION WITH INTRAOCULAR LENS IMPLANT  2003    right eye       FAMILY HISTORY:  Family History   Problem Relation Age of Onset     CANCER Mother      CANCER Father      Colon Cancer Father      Prostate Cancer No family hx of        SOCIAL HISTORY:  Social History     Social History     Marital status:      Spouse name: N/A     Number of children: N/A     Years of education: N/A     Social History Main Topics     Smoking status: Never Smoker     Smokeless tobacco: Never Used     Alcohol use Yes      Comment: ocass     Drug use: No     Sexual activity: Yes     Partners: Female     Other Topics Concern     Parent/Sibling W/ Cabg, Mi Or Angioplasty Before 65f 55m? No      Service Yes     Blood Transfusions No     Caffeine Concern No     Occupational Exposure No     Hobby Hazards No     Sleep Concern No     Stress Concern No     Weight Concern No     Special Diet No     Back Care No     Exercise Yes     Bike Helmet No     Seat Belt Yes     Self-Exams No     Social History Narrative       Review of Systems:  Skin:  Negative     Eyes:  Positive for glasses  ENT:  Negative    Respiratory:  Negative    Cardiovascular:  Negative;chest pain;syncope or near-syncope;lightheadedness;dizziness;cyanosis;fatigue Positive  for  Gastroenterology: Negative    Genitourinary:  Negative    Musculoskeletal:  Positive for arthritis  Neurologic:  Negative    Psychiatric:  Negative    Heme/Lymph/Imm:  Negative    Endocrine:  Negative        Recent Lab Results:  LIPID RESULTS:  Lab Results   Component Value Date    CHOL 200 (H) 05/28/2015    HDL 48 05/28/2015     05/28/2015    TRIG 130 05/28/2015    CHOLHDLRATIO 4.2 05/28/2015       LIVER ENZYME RESULTS:  Lab Results   Component Value Date    AST 17 05/28/2015    ALT 37 05/28/2015       CBC RESULTS:  Lab Results   Component Value Date    WBC 6.8 11/17/2013    RBC 4.60 11/17/2013    HGB 14.1 11/17/2013    HCT 41.2 11/17/2013    MCV 90 11/17/2013    MCH 30.7 11/17/2013    MCHC 34.2 11/17/2013    RDW 12.4 11/17/2013     11/17/2013       BMP RESULTS:  Lab Results   Component Value Date     05/28/2015    POTASSIUM 4.2 06/20/2017    CHLORIDE 104 05/28/2015    CO2 26 05/28/2015    ANIONGAP 8 05/28/2015    GLC 96 05/28/2015    BUN 22 05/28/2015    CR 1.04 05/28/2015    GFRESTIMATED 71 05/28/2015    GFRESTBLACK 86 05/28/2015    CHUCKY 9.2 05/28/2015        A1C RESULTS:  Lab Results   Component Value Date    A1C 5.6 02/26/2013       INR RESULTS:  Lab Results   Component Value Date    INR 1.18 (H) 06/05/2017         ECHOCARDIOGRAM  No results found for this or any previous visit (from the past 8760 hour(s)).      Orders Placed This Encounter   Procedures     CT Chest Angio w/o & w Contrast     EKG 12-lead complete w/read - Clinics (performed today)     EP Ablation/ EP Studies     No orders of the defined types were placed in this encounter.    Medications Discontinued During This Encounter   Medication Reason     lisinopril (PRINIVIL,ZESTRIL) 5 MG tablet Stopped by Patient         Encounter Diagnoses   Name Primary?     Essential hypertension with goal blood pressure less than 140/90 Yes     Atrial fibrillation (H)      Persistent atrial fibrillation (H)          LUCIA Juarez  [facility]  [address] [place] [address]

## 2017-06-26 NOTE — MR AVS SNAPSHOT
After Visit Summary   6/26/2017    Charlene Gutierrez    MRN: [id number]           Patient Information     Date Of Birth          1945        Visit Information        Provider Department      6/26/2017 10:00 AM Sreekanth Aguayo MD [facility]YS[facility] AT [place]        Today's Diagnoses     Essential hypertension with goal blood pressure less than 140/90    -  1    Persistent atrial fibrillation (H)           Follow-ups after your visit        Your next 10 appointments already scheduled     Jul 05, 2017  2:00 PM CDT   CT CHEST ANGIO W/O & W CONTRAST with SCICT1   [facility] (Cardiovascular Imaging at [facility])    [facility]  [address]  [facility] [address] [phone]           Please bring any scans or X-rays taken at other hospitals, if similar tests were done. Also bring a list of your medicines, including vitamins, minerals and over-the-counter drugs. It is safest to leave personal items at home.  Be sure to tell your doctor:   If you have any allergies.   If there s any chance you are pregnant.   If you are breastfeeding.   If you have any special needs.  You will have contrast for this exam. To prepare:   Do not eat or drink for 2 hours before your exam. If you need to take medicine, you may take it with small sips of water. (We may ask you to take liquid medicine as well.)   The day before your exam, drink extra fluids at least six 8-ounce glasses (unless your doctor tells you to restrict your fluids).  Patients over 70 or patients with diabetes or kidney problems:   If you haven t had a blood test (creatinine test) within the last 30 days, go to your clinic or Diagnostic Imaging Department for this test.  If you have diabetes:   If your kidney function is normal, continue taking your metformin (Avandamet, Glucophage, Glucovance, Metaglip) on the day of your exam.   If your kidney function is abnormal, wait 48  hours before restarting this medicine.  Please wear loose clothing, such as a sweat suit or jogging clothes. Avoid snaps, zippers and other metal. We may ask you to undress and put on a hospital gown.  If you have any questions, please call the Imaging Department where you will have your exam.            Jul 06, 2017   Procedure with GENERIC ANESTHESIA PROVIDER   [facility] PeriOP [facility] (--)    [address]  [place] [address]   [phone]            Jul 06, 2017  1:00 PM CDT   Ep 90 Minute with SHCVR4   [facility] Cardiac Catheterization Lab ([facility])    [address]  [place] [address] [phone]            Jul 17, 2017  3:50 PM CDT   [facility] EP RETURN with Jyoti Luna PA-C   [facility] PHYSICIANS HEART AT [place] ([facility] PSA Clinics)    [address]  [place] [address] [phone]              Future tests that were ordered for you today     Open Future Orders        Priority Expected Expires Ordered    CT Chest Angio w/o & w Contrast Routine 6/27/2017 6/26/2018 6/26/2017    EP Ablation/ EP Studies Routine 7/6/2017 6/26/2018 6/26/2017            Who to contact     If you have questions or need follow up information about today's clinic visit or your schedule please contact [facility]YS[facility] AT [place] directly at [phone].  Normal or non-critical lab and imaging results will be communicated to you by MyChart, letter or phone within 4 business days after the clinic has received the results. If you do not hear from us within 7 days, please contact the clinic through [employer] or phone. If you have a critical or abnormal lab result, we will notify you by phone as soon as possible.  Submit refill requests through [employer] or call your pharmacy and they will forward the refill request to us. Please allow 3 business days for your refill to be completed.          Additional  "Information About Your Visit        MyChart Information     [employer] gives you secure access to your electronic health record. If you see a primary care provider, you can also send messages to your care team and make appointments. If you have questions, please call your primary care clinic.  If you do not have a primary care provider, please call [phone] and they will assist you.        Care EveryWhere ID     This is your Care EveryWhere ID. This could be used by other organizations to access your [place] medical records  [id number]        Your Vitals Were     Pulse Height BMI (Body Mass Index)             92 1.651 m (5' 5\") 28.72 kg/m2          Blood Pressure from Last 3 Encounters:   06/26/17 108/72   06/20/17 115/83   06/12/17 120/82    Weight from Last 3 Encounters:   06/26/17 78.3 kg (172 lb 9.6 oz)   06/09/17 77.1 kg (170 lb)   06/05/17 78.3 kg (172 lb 9.6 oz)              We Performed the Following     EKG 12-lead complete w/read - Clinics (performed today)        Primary Care Provider Office Phone # Fax #    Ann Aristides [phone]       [facility] [address] [place] [address]        Equal Access to Services     HOLLIE LUIS : Hadii aad ku hadasho Soomaali, waaxda luqadaha, qaybta kaalmada adeegyada, waxay idiin hayadieln hilary dumont. So [facility] [phone].    ATENCIÓN: Si habla español, tiene a dejesus disposición servicios gratuitos de asistencia lingüística. Llame al [phone].    We comply with applicable federal civil rights laws and [place] laws. We do not discriminate on the basis of race, color, national origin, age, disability sex, sexual orientation or gender identity.            Thank you!     Thank you for choosing [facility] PHYSICIANS HEART AT [place]  for your care. Our goal is always to provide you with excellent care. Hearing back from our patients is one way we can continue to improve our services. Please take a few " minutes to complete the written survey that you may receive in the mail after your visit with us. Thank you!             Your Updated Medication List - Protect others around you: Learn how to safely use, store and throw away your medicines at www.disposemymeds.org.          This list is accurate as of: 6/26/17 11:23 AM.  Always use your most recent med list.                   Brand Name Dispense Instructions for use Diagnosis    apixaban ANTICOAGULANT 5 MG tablet    ELIQUIS     Take 1 tablet (5 mg) by mouth 2 times daily Samples given    Atrial fibrillation, unspecified type (H)       * Elastic Bandage Misc     1 each    1 each daily. Use it on the right leg as instructed.    Varicose veins       * JOBST FOR MEN KNEE HIGH/LG Misc     1 each    1 each daily Use it on the right leg as instructed    Varicose veins       FISH OIL      one capsule daily        flecainide 100 MG tablet    TAMBOCOR     Take 1 tablet (100 mg) by mouth 2 times daily        iron 325 (65 FE) MG tablet      1 TABLET DAILY        metoprolol 25 MG 24 hr tablet    TOPROL XL     Take 1 tablet (25 mg) by mouth daily    Persistent atrial fibrillation (H)       nitroglycerin 0.4 MG sublingual tablet    NITROSTAT    25 tablet    For chest pain place 1 tablet under the tongue every 5 minutes for 3 doses. If symptoms persist 5 minutes after 1st dose call 911.    Chest pressure       order for DME     1 Device    Equipment being ordered: heel lift- MD    Achilles tendinosis, right       sildenafil 50 MG cap/tab    REVATIO/VIAGRA    36 tablet    Take 0.5-1 tablets (25-50 mg) by mouth daily as needed for erectile dysfunction Take 30 min to 4 hours before intercourse.  Never use with nitroglycerin, terazosin or doxazosin.    Erectile dysfunction       STATIN NOT PRESCRIBED (INTENTIONAL)      by Other route continuous prn.    Hypertension goal BP (blood pressure) < 140/90, Hyperlipidemia LDL goal <130       * Notice:  This list has 2 medication(s) that are  the same as other medications prescribed for you. Read the directions carefully, and ask your doctor or other care provider to review them with you.

## 2017-06-26 NOTE — PROGRESS NOTES
REASON FOR VISIT:  Evaluation of paroxysmal atrial fibrillation.      HISTORY OF PRESENT ILLNESS:  Mr. Gutierrez is a pleasant 71-year-old gentleman with history of hypertension, hyperlipidemia, atrial fibrillation who is here for evaluation.      At the moment, patient is doing well; however, he now is in persistent atrial fibrillation.  He informs that he feels overall better when he is in normal rhythm.  He affirms that while in atrial fibrillation, he feels more fatigued and experiences dyspnea on exertion.  He also complains of episodes of intermittent lightheadedness.     Of note, I last saw him in 01/2017.  At that time, I started him on a combination of metoprolol and flecainide.  Later, he progressed with recurrent episodes of atrial fibrillation.  He underwent RHETT cardioversion on 06/05.  Unfortunately, after the cardioversion, he had recurrence of atrial fibrillation.  Flecainide was then increased to 100 mg b.i.d.  He had a second cardioversion on 06/20, however on 06/22 he was again back in atrial fibrillation.      EKG done here today confirms atrial fibrillation with controlled ventricular response, pulse is 89 beats per minute, QRS measured 104 milliseconds.     Previous stress test done on 06/12 was negative for ischemia.  EF was 65%.  RHETT was obtained on 06/05 showed EF of 55%-60%.  He informs that he has been compliant anticoagulation since RHETT.        ASSESSMENT AND PLAN:   1.  Recurrent persistent atrial fibrillation.  Failed therapy with metoprolol.  At this time, we discussed options including rate versus rhythm control strategy.  I favor ablation procedure as he seems to be asymptomatic with atrial fibrillation.      I explained the procedure in detail.  He understands that there is a 3% risk of complications associated with the procedure and the success rate will be around 50%-60%.  At this time, he would like to pursue it.  We will make arrangements to have it done on 07/06.     I also gave  samples for him of Pradaxa, which he should start once he finishes Eliquis.  He has enough Eliquis for this week.  He knows that he will need to continue medications including anticoagulants for at least 3 months after ablation procedure.     2.  Embolic prevention.  CHADS-VASc score of 2.  He will need anticoagulation indefinitely.   3.  Hypertension:  Blood pressure is well controlled.         MERE FARFAN MD             D: 2017 11:23   T: 2017 17:30   MT:       Name:     ISIAH MERINO   MRN:      [id number]        Account:      [id number]   :      1945           Service Date: 2017      Document: [id number]

## 2017-06-26 NOTE — LETTER
6/26/2017    Ann Irving  [facility] [place]  [address]  [place] [address]    RE: Charlene Bahepi       Dear Colleague,    I had the pleasure of seeing Charlene Gutierrez in the [facility].    REASON FOR VISIT:  Evaluation of paroxysmal atrial fibrillation.      Mr. Gutierrez is a pleasant 71-year-old gentleman with history of hypertension, hyperlipidemia, atrial fibrillation who is here for evaluation.      At the moment, patient is doing well; however, he now is in persistent atrial fibrillation.  He informs that he feels overall better when he is in normal rhythm.  He affirms that while in atrial fibrillation, he feels more fatigued and experiences dyspnea on exertion.  He also complains of episodes of intermittent lightheadedness.     Of note, I last saw him in 01/2017.  At that time, I started him on a combination of metoprolol and flecainide.  Later, he progressed with recurrent episodes of atrial fibrillation.  He underwent RHETT cardioversion on 06/05.  Unfortunately, after the cardioversion, he had recurrence of atrial fibrillation.  Flecainide was then increased to 100 mg b.i.d.  He had a second cardioversion on 06/20, however on 06/22 he was again back in atrial fibrillation.      EKG done here today confirms atrial fibrillation with controlled ventricular response, pulse is 89 beats per minute, QRS measured 104 milliseconds.     Previous stress test done on 06/12 was negative for ischemia.  EF was 65%.  RHETT was obtained on 06/05 showed EF of 55%-60%.  He informs that he has been compliant anticoagulation since RHETT.       Outpatient Encounter Prescriptions as of 6/26/2017   Medication Sig Dispense Refill     [DISCONTINUED] flecainide (TAMBOCOR) 100 MG tablet Take 1 tablet (100 mg) by mouth 2 times daily       nitroglycerin (NITROSTAT) 0.4 MG sublingual tablet For chest pain place 1 tablet under the tongue every 5 minutes for 3 doses. If symptoms persist 5  minutes after 1st dose call 911. 25 tablet 0     [DISCONTINUED] apixaban ANTICOAGULANT (ELIQUIS) 5 MG tablet Take 1 tablet (5 mg) by mouth 2 times daily Samples given       [DISCONTINUED] metoprolol (TOPROL XL) 25 MG 24 hr tablet Take 1 tablet (25 mg) by mouth daily       Elastic Bandages & Supports (JOBST FOR MEN KNEE HIGH/LG) MISC 1 each daily Use it on the right leg as instructed 1 each 1     sildenafil (VIAGRA) 50 MG tablet Take 0.5-1 tablets (25-50 mg) by mouth daily as needed for erectile dysfunction Take 30 min to 4 hours before intercourse.  Never use with nitroglycerin, terazosin or doxazosin. 36 tablet 0     ORDER FOR DME Equipment being ordered: heel lift- MD 1 Device 0     Elastic Bandage MISC 1 each daily. Use it on the right leg as instructed. 1 each 1     FISH OIL one capsule daily       IRON 325 (65 FE) MG OR TABS 1 TABLET DAILY       [DISCONTINUED] lisinopril (PRINIVIL,ZESTRIL) 5 MG tablet TAKE 1 TABLET DAILY 30 tablet 0     STATIN NOT PRESCRIBED, INTENTIONAL, by Other route continuous prn.  0     No facility-administered encounter medications on file as of 6/26/2017.       ASSESSMENT AND PLAN:   1.  Recurrent persistent atrial fibrillation.  Failed therapy with metoprolol.  At this time, we discussed options including rate versus rhythm control strategy.  I favor ablation procedure as he seems to be asymptomatic with atrial fibrillation.      I explained the procedure in detail.  He understands that there is a 3% risk of complications associated with the procedure and the success rate will be around 50%-60%.  At this time, he would like to pursue it.  We will make arrangements to have it done on 07/06.     I also gave samples for him of Pradaxa, which he should start once he finishes Eliquis.  He has enough Eliquis for this week.  He knows that he will need to continue medications including anticoagulants for at least 3 months after ablation procedure.     2.  Embolic prevention.  CHADS-VASc score  of 2.  He will need anticoagulation indefinitely.   3.  Hypertension:  Blood pressure is well controlled.     Again, thank you for allowing me to participate in the care of your patient.      Sincerely,    Sreekanth Aguayo MD

## 2017-06-28 LAB — INTERPRETATION ECG - MUSE: NORMAL

## 2017-07-05 ENCOUNTER — HOSPITAL ENCOUNTER (OUTPATIENT)
Dept: CARDIOLOGY | Facility: CLINIC | Age: 72
Discharge: HOME | End: 2017-07-05
Attending: INTERNAL MEDICINE | Admitting: INTERNAL MEDICINE
Payer: COMMERCIAL

## 2017-07-05 ENCOUNTER — TELEPHONE (OUTPATIENT)
Dept: CARDIOLOGY | Facility: CLINIC | Age: 72
End: 2017-07-05

## 2017-07-05 DIAGNOSIS — I48.19 PERSISTENT ATRIAL FIBRILLATION (H): ICD-10-CM

## 2017-07-05 DIAGNOSIS — I48.19 PERSISTENT ATRIAL FIBRILLATION (H): Primary | ICD-10-CM

## 2017-07-05 LAB
CREAT BLD-MCNC: 1 MG/DL (ref 0.66–1.25)
GFR SERPL CREATININE-BSD FRML MDRD: 74 ML/MIN/1.7M2

## 2017-07-05 PROCEDURE — 25000128 H RX IP 250 OP 636: Performed by: INTERNAL MEDICINE

## 2017-07-05 PROCEDURE — 82565 ASSAY OF CREATININE: CPT

## 2017-07-05 PROCEDURE — 75572 CT HRT W/3D IMAGE: CPT

## 2017-07-05 PROCEDURE — 75572 CT HRT W/3D IMAGE: CPT | Mod: 26 | Performed by: INTERNAL MEDICINE

## 2017-07-05 RX ORDER — METHYLPREDNISOLONE SODIUM SUCCINATE 125 MG/2ML
125 INJECTION, POWDER, LYOPHILIZED, FOR SOLUTION INTRAMUSCULAR; INTRAVENOUS
Status: DISCONTINUED | OUTPATIENT
Start: 2017-07-05 | End: 2017-07-06 | Stop reason: HOSPADM

## 2017-07-05 RX ORDER — ONDANSETRON 2 MG/ML
4 INJECTION INTRAMUSCULAR; INTRAVENOUS
Status: DISCONTINUED | OUTPATIENT
Start: 2017-07-05 | End: 2017-07-06 | Stop reason: HOSPADM

## 2017-07-05 RX ORDER — ACYCLOVIR 200 MG/1
0-1 CAPSULE ORAL
Status: DISCONTINUED | OUTPATIENT
Start: 2017-07-05 | End: 2017-07-06 | Stop reason: HOSPADM

## 2017-07-05 RX ORDER — IOPAMIDOL 755 MG/ML
150 INJECTION, SOLUTION INTRAVASCULAR ONCE
Status: COMPLETED | OUTPATIENT
Start: 2017-07-05 | End: 2017-07-05

## 2017-07-05 RX ORDER — LIDOCAINE 40 MG/G
CREAM TOPICAL
Status: CANCELLED | OUTPATIENT
Start: 2017-07-05

## 2017-07-05 RX ORDER — DIPHENHYDRAMINE HYDROCHLORIDE 50 MG/ML
25-50 INJECTION INTRAMUSCULAR; INTRAVENOUS
Status: DISCONTINUED | OUTPATIENT
Start: 2017-07-05 | End: 2017-07-06 | Stop reason: HOSPADM

## 2017-07-05 RX ORDER — DIPHENHYDRAMINE HCL 25 MG
25 CAPSULE ORAL
Status: DISCONTINUED | OUTPATIENT
Start: 2017-07-05 | End: 2017-07-06 | Stop reason: HOSPADM

## 2017-07-05 RX ORDER — SODIUM CHLORIDE 450 MG/100ML
INJECTION, SOLUTION INTRAVENOUS CONTINUOUS
Status: CANCELLED | OUTPATIENT
Start: 2017-07-05

## 2017-07-05 RX ADMIN — IOPAMIDOL 90 ML: 755 INJECTION, SOLUTION INTRAVENOUS at 14:43

## 2017-07-05 RX ADMIN — SODIUM CHLORIDE 100 ML: 9 INJECTION, SOLUTION INTRAVENOUS at 14:43

## 2017-07-05 NOTE — TELEPHONE ENCOUNTER
LM for pt to call back if the were any concerns or questions prior to Ablation. Pt was to have switched Eliquis to Pradaxa 48 priors to procedure. Left reminder of check in time and nothing to eat after midnight.Arvin

## 2017-07-06 ENCOUNTER — SURGERY (OUTPATIENT)
Age: 72
End: 2017-07-06

## 2017-07-06 ENCOUNTER — HOSPITAL ENCOUNTER (OUTPATIENT)
Facility: CLINIC | Age: 72
Discharge: HOME | End: 2017-07-07
Attending: INTERNAL MEDICINE | Admitting: INTERNAL MEDICINE
Payer: COMMERCIAL

## 2017-07-06 ENCOUNTER — ANESTHESIA EVENT (OUTPATIENT)
Dept: SURGERY | Facility: CLINIC | Age: 72
End: 2017-07-06
Payer: COMMERCIAL

## 2017-07-06 ENCOUNTER — ANESTHESIA (OUTPATIENT)
Dept: SURGERY | Facility: CLINIC | Age: 72
End: 2017-07-06
Payer: COMMERCIAL

## 2017-07-06 ENCOUNTER — APPOINTMENT (OUTPATIENT)
Dept: CARDIOLOGY | Facility: CLINIC | Age: 72
End: 2017-07-06
Attending: INTERNAL MEDICINE
Payer: COMMERCIAL

## 2017-07-06 DIAGNOSIS — I48.19 PERSISTENT ATRIAL FIBRILLATION (H): ICD-10-CM

## 2017-07-06 DIAGNOSIS — I48.91 ATRIAL FIBRILLATION, UNSPECIFIED TYPE (H): ICD-10-CM

## 2017-07-06 LAB
ANION GAP SERPL CALCULATED.3IONS-SCNC: 8 MMOL/L (ref 3–14)
APTT PPP: 61 SEC (ref 22–37)
BUN SERPL-MCNC: 17 MG/DL (ref 7–30)
CALCIUM SERPL-MCNC: 8.6 MG/DL (ref 8.5–10.1)
CHLORIDE SERPL-SCNC: 109 MMOL/L (ref 94–109)
CO2 SERPL-SCNC: 22 MMOL/L (ref 20–32)
CREAT SERPL-MCNC: 0.94 MG/DL (ref 0.66–1.25)
ERYTHROCYTE [DISTWIDTH] IN BLOOD BY AUTOMATED COUNT: 12.4 % (ref 10–15)
GFR SERPL CREATININE-BSD FRML MDRD: 80 ML/MIN/1.7M2
GLUCOSE SERPL-MCNC: 95 MG/DL (ref 70–99)
HCT VFR BLD AUTO: 37.2 % (ref 40–53)
HGB BLD-MCNC: 12.9 G/DL (ref 13.3–17.7)
INR PPP: 1.13 (ref 0.86–1.14)
MCH RBC QN AUTO: 31.4 PG (ref 26.5–33)
MCHC RBC AUTO-ENTMCNC: 34.7 G/DL (ref 31.5–36.5)
MCV RBC AUTO: 91 FL (ref 78–100)
PLATELET # BLD AUTO: 249 10E9/L (ref 150–450)
POTASSIUM SERPL-SCNC: 4.3 MMOL/L (ref 3.4–5.3)
RBC # BLD AUTO: 4.11 10E12/L (ref 4.4–5.9)
SODIUM SERPL-SCNC: 139 MMOL/L (ref 133–144)
WBC # BLD AUTO: 5.4 10E9/L (ref 4–11)

## 2017-07-06 PROCEDURE — 40000935 ZZH STATISTIC OUTPATIENT (NON-OBS) EVE

## 2017-07-06 PROCEDURE — 3E033KZ INTRODUCTION OF OTHER DIAGNOSTIC SUBSTANCE INTO PERIPHERAL VEIN, PERCUTANEOUS APPROACH: ICD-10-PCS | Performed by: INTERNAL MEDICINE

## 2017-07-06 PROCEDURE — 80048 BASIC METABOLIC PNL TOTAL CA: CPT | Performed by: INTERNAL MEDICINE

## 2017-07-06 PROCEDURE — 4A023FZ MEASUREMENT OF CARDIAC RHYTHM, PERCUTANEOUS APPROACH: ICD-10-PCS | Performed by: INTERNAL MEDICINE

## 2017-07-06 PROCEDURE — 27210782 ZZH KIT EP TOTES DISP CR7

## 2017-07-06 PROCEDURE — 93655 ICAR CATH ABLTJ DSCRT ARRHYT: CPT | Performed by: INTERNAL MEDICINE

## 2017-07-06 PROCEDURE — B246ZZZ ULTRASONOGRAPHY OF RIGHT AND LEFT HEART: ICD-10-PCS | Performed by: INTERNAL MEDICINE

## 2017-07-06 PROCEDURE — 27211287 ZZ H NEEDLE BRK TRANSEPTAL CR10

## 2017-07-06 PROCEDURE — 27210796 ZZH PAD EXTRNAL REFRENCE CARDIAC MAPPING CR14

## 2017-07-06 PROCEDURE — 02583ZZ DESTRUCTION OF CONDUCTION MECHANISM, PERCUTANEOUS APPROACH: ICD-10-PCS | Performed by: INTERNAL MEDICINE

## 2017-07-06 PROCEDURE — C1732 CATH, EP, DIAG/ABL, 3D/VECT: HCPCS

## 2017-07-06 PROCEDURE — 25000566 ZZH SEVOFLURANE, EA 15 MIN

## 2017-07-06 PROCEDURE — 93010 ELECTROCARDIOGRAM REPORT: CPT | Performed by: INTERNAL MEDICINE

## 2017-07-06 PROCEDURE — 25000125 ZZHC RX 250: Performed by: NURSE ANESTHETIST, CERTIFIED REGISTERED

## 2017-07-06 PROCEDURE — 93655 ICAR CATH ABLTJ DSCRT ARRHYT: CPT

## 2017-07-06 PROCEDURE — 25000125 ZZHC RX 250: Performed by: INTERNAL MEDICINE

## 2017-07-06 PROCEDURE — 93662 INTRACARDIAC ECG (ICE): CPT

## 2017-07-06 PROCEDURE — 25000128 H RX IP 250 OP 636: Performed by: NURSE ANESTHETIST, CERTIFIED REGISTERED

## 2017-07-06 PROCEDURE — 37000009 ZZH ANESTHESIA TECHNICAL FEE, EACH ADDTL 15 MIN

## 2017-07-06 PROCEDURE — 37000008 ZZH ANESTHESIA TECHNICAL FEE, 1ST 30 MIN

## 2017-07-06 PROCEDURE — C1893 INTRO/SHEATH, FIXED,NON-PEEL: HCPCS

## 2017-07-06 PROCEDURE — 93613 INTRACARDIAC EPHYS 3D MAPG: CPT

## 2017-07-06 PROCEDURE — 27210964 ZZH ACCESS EP PROC CR8

## 2017-07-06 PROCEDURE — C1769 GUIDE WIRE: HCPCS

## 2017-07-06 PROCEDURE — 4A0234Z MEASUREMENT OF CARDIAC ELECTRICAL ACTIVITY, PERCUTANEOUS APPROACH: ICD-10-PCS | Performed by: INTERNAL MEDICINE

## 2017-07-06 PROCEDURE — 40000852 ZZH STATISTIC HEART CATH LAB OR EP LAB

## 2017-07-06 PROCEDURE — 71000012 ZZH RECOVERY PHASE 1 LEVEL 1 FIRST HR

## 2017-07-06 PROCEDURE — 85610 PROTHROMBIN TIME: CPT | Performed by: INTERNAL MEDICINE

## 2017-07-06 PROCEDURE — 25000132 ZZH RX MED GY IP 250 OP 250 PS 637: Performed by: INTERNAL MEDICINE

## 2017-07-06 PROCEDURE — 40000936 ZZH STATISTIC OUTPATIENT (NON-OBS) NIGHT

## 2017-07-06 PROCEDURE — 25000128 H RX IP 250 OP 636: Performed by: ANESTHESIOLOGY

## 2017-07-06 PROCEDURE — 27210995 ZZH RX 272: Performed by: INTERNAL MEDICINE

## 2017-07-06 PROCEDURE — C1759 CATH, INTRA ECHOCARDIOGRAPHY: HCPCS

## 2017-07-06 PROCEDURE — 02K83ZZ MAP CONDUCTION MECHANISM, PERCUTANEOUS APPROACH: ICD-10-PCS | Performed by: INTERNAL MEDICINE

## 2017-07-06 PROCEDURE — 93613 INTRACARDIAC EPHYS 3D MAPG: CPT | Performed by: INTERNAL MEDICINE

## 2017-07-06 PROCEDURE — 25000128 H RX IP 250 OP 636: Performed by: INTERNAL MEDICINE

## 2017-07-06 PROCEDURE — 93656 COMPRE EP EVAL ABLTJ ATR FIB: CPT | Performed by: INTERNAL MEDICINE

## 2017-07-06 PROCEDURE — 27210886 ZZH ACCESSORY CR5

## 2017-07-06 PROCEDURE — 27210795 ZZH PAD DEFIB QUICK CR4

## 2017-07-06 PROCEDURE — 93656 COMPRE EP EVAL ABLTJ ATR FIB: CPT

## 2017-07-06 PROCEDURE — 36415 COLL VENOUS BLD VENIPUNCTURE: CPT | Performed by: INTERNAL MEDICINE

## 2017-07-06 PROCEDURE — 85730 THROMBOPLASTIN TIME PARTIAL: CPT | Performed by: INTERNAL MEDICINE

## 2017-07-06 PROCEDURE — 93005 ELECTROCARDIOGRAM TRACING: CPT

## 2017-07-06 PROCEDURE — 93662 INTRACARDIAC ECG (ICE): CPT | Mod: 26 | Performed by: INTERNAL MEDICINE

## 2017-07-06 PROCEDURE — 85347 COAGULATION TIME ACTIVATED: CPT

## 2017-07-06 PROCEDURE — 85027 COMPLETE CBC AUTOMATED: CPT | Performed by: INTERNAL MEDICINE

## 2017-07-06 RX ORDER — NALOXONE HYDROCHLORIDE 0.4 MG/ML
.1-.4 INJECTION, SOLUTION INTRAMUSCULAR; INTRAVENOUS; SUBCUTANEOUS
Status: DISCONTINUED | OUTPATIENT
Start: 2017-07-06 | End: 2017-07-07 | Stop reason: HOSPADM

## 2017-07-06 RX ORDER — NALOXONE HYDROCHLORIDE 0.4 MG/ML
0.4 INJECTION, SOLUTION INTRAMUSCULAR; INTRAVENOUS; SUBCUTANEOUS EVERY 5 MIN PRN
Status: DISCONTINUED | OUTPATIENT
Start: 2017-07-06 | End: 2017-07-06 | Stop reason: HOSPADM

## 2017-07-06 RX ORDER — PROTAMINE SULFATE 10 MG/ML
1-5 INJECTION, SOLUTION INTRAVENOUS
Status: DISCONTINUED | OUTPATIENT
Start: 2017-07-06 | End: 2017-07-06 | Stop reason: HOSPADM

## 2017-07-06 RX ORDER — FENTANYL CITRATE 50 UG/ML
25-50 INJECTION, SOLUTION INTRAMUSCULAR; INTRAVENOUS
Status: DISCONTINUED | OUTPATIENT
Start: 2017-07-06 | End: 2017-07-06 | Stop reason: HOSPADM

## 2017-07-06 RX ORDER — FLUMAZENIL 0.1 MG/ML
0.2 INJECTION, SOLUTION INTRAVENOUS
Status: DISCONTINUED | OUTPATIENT
Start: 2017-07-06 | End: 2017-07-06 | Stop reason: HOSPADM

## 2017-07-06 RX ORDER — ONDANSETRON 4 MG/1
4 TABLET, ORALLY DISINTEGRATING ORAL EVERY 30 MIN PRN
Status: DISCONTINUED | OUTPATIENT
Start: 2017-07-06 | End: 2017-07-07 | Stop reason: HOSPADM

## 2017-07-06 RX ORDER — SODIUM CHLORIDE, SODIUM LACTATE, POTASSIUM CHLORIDE, CALCIUM CHLORIDE 600; 310; 30; 20 MG/100ML; MG/100ML; MG/100ML; MG/100ML
INJECTION, SOLUTION INTRAVENOUS CONTINUOUS
Status: DISCONTINUED | OUTPATIENT
Start: 2017-07-06 | End: 2017-07-07 | Stop reason: HOSPADM

## 2017-07-06 RX ORDER — ADENOSINE 3 MG/ML
6-12 INJECTION, SOLUTION INTRAVENOUS EVERY 5 MIN PRN
Status: DISCONTINUED | OUTPATIENT
Start: 2017-07-06 | End: 2017-07-06 | Stop reason: HOSPADM

## 2017-07-06 RX ORDER — LORAZEPAM 2 MG/ML
.5-2 INJECTION INTRAMUSCULAR EVERY 10 MIN PRN
Status: DISCONTINUED | OUTPATIENT
Start: 2017-07-06 | End: 2017-07-06 | Stop reason: HOSPADM

## 2017-07-06 RX ORDER — ONDANSETRON 2 MG/ML
4 INJECTION INTRAMUSCULAR; INTRAVENOUS EVERY 4 HOURS PRN
Status: DISCONTINUED | OUTPATIENT
Start: 2017-07-06 | End: 2017-07-06 | Stop reason: HOSPADM

## 2017-07-06 RX ORDER — MORPHINE SULFATE 2 MG/ML
1-2 INJECTION, SOLUTION INTRAMUSCULAR; INTRAVENOUS EVERY 5 MIN PRN
Status: DISCONTINUED | OUTPATIENT
Start: 2017-07-06 | End: 2017-07-06 | Stop reason: HOSPADM

## 2017-07-06 RX ORDER — IBUTILIDE FUMARATE 1 MG/10ML
1 INJECTION, SOLUTION INTRAVENOUS
Status: DISCONTINUED | OUTPATIENT
Start: 2017-07-06 | End: 2017-07-06 | Stop reason: HOSPADM

## 2017-07-06 RX ORDER — PROTAMINE SULFATE 10 MG/ML
5-40 INJECTION, SOLUTION INTRAVENOUS EVERY 10 MIN PRN
Status: DISCONTINUED | OUTPATIENT
Start: 2017-07-06 | End: 2017-07-06 | Stop reason: HOSPADM

## 2017-07-06 RX ORDER — BUPIVACAINE HYDROCHLORIDE 2.5 MG/ML
10-30 INJECTION, SOLUTION EPIDURAL; INFILTRATION; INTRACAUDAL
Status: DISCONTINUED | OUTPATIENT
Start: 2017-07-06 | End: 2017-07-06 | Stop reason: HOSPADM

## 2017-07-06 RX ORDER — DIPHENHYDRAMINE HYDROCHLORIDE 50 MG/ML
25-50 INJECTION INTRAMUSCULAR; INTRAVENOUS
Status: DISCONTINUED | OUTPATIENT
Start: 2017-07-06 | End: 2017-07-06 | Stop reason: HOSPADM

## 2017-07-06 RX ORDER — LIDOCAINE HYDROCHLORIDE AND EPINEPHRINE 10; 10 MG/ML; UG/ML
10-30 INJECTION, SOLUTION INFILTRATION; PERINEURAL
Status: DISCONTINUED | OUTPATIENT
Start: 2017-07-06 | End: 2017-07-06 | Stop reason: HOSPADM

## 2017-07-06 RX ORDER — DOBUTAMINE HYDROCHLORIDE 200 MG/100ML
5-40 INJECTION INTRAVENOUS CONTINUOUS PRN
Status: DISCONTINUED | OUTPATIENT
Start: 2017-07-06 | End: 2017-07-06 | Stop reason: HOSPADM

## 2017-07-06 RX ORDER — IBUTILIDE FUMARATE 1 MG/10ML
0.01 INJECTION, SOLUTION INTRAVENOUS
Status: DISCONTINUED | OUTPATIENT
Start: 2017-07-06 | End: 2017-07-06 | Stop reason: HOSPADM

## 2017-07-06 RX ORDER — LIDOCAINE 40 MG/G
CREAM TOPICAL
Status: DISCONTINUED | OUTPATIENT
Start: 2017-07-06 | End: 2017-07-07 | Stop reason: HOSPADM

## 2017-07-06 RX ORDER — LIDOCAINE HYDROCHLORIDE 10 MG/ML
10-30 INJECTION, SOLUTION EPIDURAL; INFILTRATION; INTRACAUDAL; PERINEURAL
Status: DISCONTINUED | OUTPATIENT
Start: 2017-07-06 | End: 2017-07-06 | Stop reason: HOSPADM

## 2017-07-06 RX ORDER — FUROSEMIDE 10 MG/ML
20-100 INJECTION INTRAMUSCULAR; INTRAVENOUS
Status: DISCONTINUED | OUTPATIENT
Start: 2017-07-06 | End: 2017-07-06 | Stop reason: HOSPADM

## 2017-07-06 RX ORDER — FENTANYL CITRATE 50 UG/ML
INJECTION, SOLUTION INTRAMUSCULAR; INTRAVENOUS PRN
Status: DISCONTINUED | OUTPATIENT
Start: 2017-07-06 | End: 2017-07-06

## 2017-07-06 RX ORDER — PROMETHAZINE HYDROCHLORIDE 25 MG/ML
6.25-25 INJECTION, SOLUTION INTRAMUSCULAR; INTRAVENOUS EVERY 4 HOURS PRN
Status: DISCONTINUED | OUTPATIENT
Start: 2017-07-06 | End: 2017-07-06 | Stop reason: HOSPADM

## 2017-07-06 RX ORDER — KETOROLAC TROMETHAMINE 15 MG/ML
15 INJECTION, SOLUTION INTRAMUSCULAR; INTRAVENOUS
Status: DISCONTINUED | OUTPATIENT
Start: 2017-07-06 | End: 2017-07-06 | Stop reason: HOSPADM

## 2017-07-06 RX ORDER — PROPOFOL 10 MG/ML
INJECTION, EMULSION INTRAVENOUS PRN
Status: DISCONTINUED | OUTPATIENT
Start: 2017-07-06 | End: 2017-07-06

## 2017-07-06 RX ORDER — ATROPINE SULFATE 0.1 MG/ML
.5-1 INJECTION INTRAVENOUS
Status: DISCONTINUED | OUTPATIENT
Start: 2017-07-06 | End: 2017-07-06 | Stop reason: HOSPADM

## 2017-07-06 RX ORDER — MEPERIDINE HYDROCHLORIDE 25 MG/ML
12.5 INJECTION INTRAMUSCULAR; INTRAVENOUS; SUBCUTANEOUS EVERY 5 MIN PRN
Status: DISCONTINUED | OUTPATIENT
Start: 2017-07-06 | End: 2017-07-06

## 2017-07-06 RX ORDER — FUROSEMIDE 10 MG/ML
20-100 INJECTION INTRAMUSCULAR; INTRAVENOUS
Status: COMPLETED | OUTPATIENT
Start: 2017-07-06 | End: 2017-07-06

## 2017-07-06 RX ORDER — KETOROLAC TROMETHAMINE 30 MG/ML
15 INJECTION, SOLUTION INTRAMUSCULAR; INTRAVENOUS
Status: DISCONTINUED | OUTPATIENT
Start: 2017-07-06 | End: 2017-07-06 | Stop reason: HOSPADM

## 2017-07-06 RX ORDER — DABIGATRAN ETEXILATE 150 MG/1
150 CAPSULE ORAL 2 TIMES DAILY
Status: DISCONTINUED | OUTPATIENT
Start: 2017-07-06 | End: 2017-07-07 | Stop reason: HOSPADM

## 2017-07-06 RX ORDER — SODIUM CHLORIDE 450 MG/100ML
INJECTION, SOLUTION INTRAVENOUS CONTINUOUS
Status: DISCONTINUED | OUTPATIENT
Start: 2017-07-06 | End: 2017-07-06 | Stop reason: HOSPADM

## 2017-07-06 RX ORDER — HEPARIN SODIUM 1000 [USP'U]/ML
1000-10000 INJECTION, SOLUTION INTRAVENOUS; SUBCUTANEOUS EVERY 5 MIN PRN
Status: DISCONTINUED | OUTPATIENT
Start: 2017-07-06 | End: 2017-07-06 | Stop reason: HOSPADM

## 2017-07-06 RX ORDER — DEXAMETHASONE SODIUM PHOSPHATE 4 MG/ML
INJECTION, SOLUTION INTRA-ARTICULAR; INTRALESIONAL; INTRAMUSCULAR; INTRAVENOUS; SOFT TISSUE PRN
Status: DISCONTINUED | OUTPATIENT
Start: 2017-07-06 | End: 2017-07-06

## 2017-07-06 RX ORDER — LIDOCAINE 40 MG/G
CREAM TOPICAL
Status: DISCONTINUED | OUTPATIENT
Start: 2017-07-06 | End: 2017-07-06 | Stop reason: HOSPADM

## 2017-07-06 RX ORDER — ALBUTEROL SULFATE 0.83 MG/ML
2.5 SOLUTION RESPIRATORY (INHALATION) EVERY 4 HOURS PRN
Status: DISCONTINUED | OUTPATIENT
Start: 2017-07-06 | End: 2017-07-07 | Stop reason: HOSPADM

## 2017-07-06 RX ORDER — LIDOCAINE HYDROCHLORIDE 10 MG/ML
10-30 INJECTION, SOLUTION EPIDURAL; INFILTRATION; INTRACAUDAL; PERINEURAL
Status: COMPLETED | OUTPATIENT
Start: 2017-07-06 | End: 2017-07-06

## 2017-07-06 RX ORDER — PROTAMINE SULFATE 10 MG/ML
1-5 INJECTION, SOLUTION INTRAVENOUS
Status: COMPLETED | OUTPATIENT
Start: 2017-07-06 | End: 2017-07-06

## 2017-07-06 RX ORDER — ONDANSETRON 2 MG/ML
4 INJECTION INTRAMUSCULAR; INTRAVENOUS EVERY 30 MIN PRN
Status: DISCONTINUED | OUTPATIENT
Start: 2017-07-06 | End: 2017-07-07 | Stop reason: HOSPADM

## 2017-07-06 RX ORDER — FENTANYL CITRATE 50 UG/ML
25-50 INJECTION, SOLUTION INTRAMUSCULAR; INTRAVENOUS
Status: DISCONTINUED | OUTPATIENT
Start: 2017-07-06 | End: 2017-07-07 | Stop reason: HOSPADM

## 2017-07-06 RX ORDER — FLECAINIDE ACETATE 100 MG/1
100 TABLET ORAL 2 TIMES DAILY
Status: DISCONTINUED | OUTPATIENT
Start: 2017-07-06 | End: 2017-07-07 | Stop reason: HOSPADM

## 2017-07-06 RX ORDER — METOPROLOL SUCCINATE 25 MG/1
25 TABLET, EXTENDED RELEASE ORAL DAILY
Status: DISCONTINUED | OUTPATIENT
Start: 2017-07-07 | End: 2017-07-07 | Stop reason: HOSPADM

## 2017-07-06 RX ADMIN — SODIUM CHLORIDE, POTASSIUM CHLORIDE, SODIUM LACTATE AND CALCIUM CHLORIDE: 600; 310; 30; 20 INJECTION, SOLUTION INTRAVENOUS at 18:00

## 2017-07-06 RX ADMIN — DABIGATRAN ETEXILATE MESYLATE 150 MG: 150 CAPSULE ORAL at 20:59

## 2017-07-06 RX ADMIN — PHENYLEPHRINE HYDROCHLORIDE 100 MCG: 10 INJECTION, SOLUTION INTRAMUSCULAR; INTRAVENOUS; SUBCUTANEOUS at 13:56

## 2017-07-06 RX ADMIN — ROCURONIUM BROMIDE 10 MG: 10 INJECTION INTRAVENOUS at 13:50

## 2017-07-06 RX ADMIN — FENTANYL CITRATE 25 MCG: 50 INJECTION, SOLUTION INTRAMUSCULAR; INTRAVENOUS at 13:59

## 2017-07-06 RX ADMIN — Medication 8000 UNITS: at 14:19

## 2017-07-06 RX ADMIN — FLECAINIDE ACETATE 100 MG: 100 TABLET ORAL at 20:20

## 2017-07-06 RX ADMIN — LIDOCAINE HYDROCHLORIDE 200 MG: 10 INJECTION, SOLUTION EPIDURAL; INFILTRATION; INTRACAUDAL; PERINEURAL at 14:08

## 2017-07-06 RX ADMIN — FENTANYL CITRATE 50 MCG: 50 INJECTION, SOLUTION INTRAMUSCULAR; INTRAVENOUS at 14:29

## 2017-07-06 RX ADMIN — PHENYLEPHRINE HYDROCHLORIDE 100 MCG: 10 INJECTION, SOLUTION INTRAMUSCULAR; INTRAVENOUS; SUBCUTANEOUS at 13:50

## 2017-07-06 RX ADMIN — FENTANYL CITRATE 25 MCG: 50 INJECTION, SOLUTION INTRAMUSCULAR; INTRAVENOUS at 13:45

## 2017-07-06 RX ADMIN — FUROSEMIDE 40 MG: 10 INJECTION, SOLUTION INTRAVENOUS at 16:38

## 2017-07-06 RX ADMIN — Medication 5000 UNITS: at 14:49

## 2017-07-06 RX ADMIN — Medication 5000 UNITS: at 15:16

## 2017-07-06 RX ADMIN — DEXAMETHASONE SODIUM PHOSPHATE 4 MG: 4 INJECTION, SOLUTION INTRA-ARTICULAR; INTRALESIONAL; INTRAMUSCULAR; INTRAVENOUS; SOFT TISSUE at 13:50

## 2017-07-06 RX ADMIN — PROPOFOL 200 MG: 10 INJECTION, EMULSION INTRAVENOUS at 13:47

## 2017-07-06 RX ADMIN — PROTAMINE SULFATE 5 MG: 10 INJECTION, SOLUTION INTRAVENOUS at 16:14

## 2017-07-06 RX ADMIN — ROCURONIUM BROMIDE 40 MG: 10 INJECTION INTRAVENOUS at 13:48

## 2017-07-06 RX ADMIN — PHENYLEPHRINE HYDROCHLORIDE 0.3 MCG/KG/MIN: 10 INJECTION, SOLUTION INTRAMUSCULAR; INTRAVENOUS; SUBCUTANEOUS at 13:53

## 2017-07-06 RX ADMIN — PROTAMINE SULFATE 35 MG: 10 INJECTION, SOLUTION INTRAVENOUS at 16:18

## 2017-07-06 RX ADMIN — MIDAZOLAM HYDROCHLORIDE 2 MG: 1 INJECTION, SOLUTION INTRAMUSCULAR; INTRAVENOUS at 13:34

## 2017-07-06 RX ADMIN — SODIUM CHLORIDE: 4.5 INJECTION, SOLUTION INTRAVENOUS at 12:15

## 2017-07-06 RX ADMIN — Medication 2000 UNITS: at 15:39

## 2017-07-06 RX ADMIN — KETOROLAC TROMETHAMINE 15 MG: 30 INJECTION, SOLUTION INTRAMUSCULAR at 16:33

## 2017-07-06 ASSESSMENT — VISUAL ACUITY: OU: NORMAL ACUITY

## 2017-07-06 ASSESSMENT — ENCOUNTER SYMPTOMS: DYSRHYTHMIAS: 1

## 2017-07-06 NOTE — ANESTHESIA PREPROCEDURE EVALUATION
Anesthesia Evaluation     . Pt has had prior anesthetic.     No history of anesthetic complications          ROS/MED HX    ENT/Pulmonary:      (-) sleep apnea   Neurologic:       Cardiovascular:     (+) Dyslipidemia, hypertension----. : . . . :. dysrhythmias a-fib, .       METS/Exercise Tolerance:     Hematologic:         Musculoskeletal:         GI/Hepatic: Comment: H/o gastric ulcer       (-) GERD   Renal/Genitourinary:     (+) chronic renal disease,       Endo:         Psychiatric:         Infectious Disease:         Malignancy:         Other:                     Physical Exam      Airway   Mallampati: II  TM distance: >3 FB  Neck ROM: full    Dental   (+) upper dentures, caps, missing and partials    Cardiovascular   Rhythm and rate: regular      Pulmonary    breath sounds clear to auscultation                    Anesthesia Plan      History & Physical Review  History and physical reviewed and following examination; no interval change.    ASA Status:  2 .        Plan for General and ETT   PONV prophylaxis:  Ondansetron (or other 5HT-3) and Dexamethasone or Solumedrol       Postoperative Care      Consents  Anesthetic plan, risks, benefits and alternatives discussed with:  Patient..                          .  Procedure: Procedure(s):  ANESTHESIA OUT OF OR CATH LAB ABLATION ADULT  Preop diagnosis: PERSISTENT AFIB    No Known Allergies  Past Medical History:   Diagnosis Date     Atrial fibrillation (H) 11/17/2013     CKD (chronic kidney disease) 2012     Gastric ulcer      HTN (hypertension), benign      Hyperlipidaemia      Past Surgical History:   Procedure Laterality Date     ANESTHESIA CARDIOVERSION N/A 6/5/2017    Procedure: ANESTHESIA CARDIOVERSION;  ANESTHESIA NEEDED FOR CARDIOVERSION IN CARE SUITES. (RHETT AT 0830);  Surgeon: GENERIC ANESTHESIA PROVIDER;  Location: [facility] OR     BRONCHOSCOPY       C INCISION OF PYLORIC MUSCLE       CARDIOVERSION  11/2013     COLONOSCOPY       EXTRACAPSULAR CATARACT EXTRATION  WITH INTRAOCULAR LENS IMPLANT  2003    right eye     Prior to Admission medications    Medication Sig Start Date End Date Taking? Authorizing Provider   flecainide (TAMBOCOR) 100 MG tablet Take 1 tablet (100 mg) by mouth 2 times daily 6/15/17   Jyoti Luna PA-C   nitroglycerin (NITROSTAT) 0.4 MG sublingual tablet For chest pain place 1 tablet under the tongue every 5 minutes for 3 doses. If symptoms persist 5 minutes after 1st dose call 911. 6/9/17   Jyoti Luna PA-C   apixaban ANTICOAGULANT (ELIQUIS) 5 MG tablet Take 1 tablet (5 mg) by mouth 2 times daily Samples given 6/2/17   Jyoti Luna PA-C   metoprolol (TOPROL XL) 25 MG 24 hr tablet Take 1 tablet (25 mg) by mouth daily 6/2/17   Jyoti Luna PA-C   Elastic Bandages & Supports (JOBST FOR MEN KNEE HIGH/LG) MISC 1 each daily Use it on the right leg as instructed 5/28/15   Fouzia Kent MD   sildenafil (VIAGRA) 50 MG tablet Take 0.5-1 tablets (25-50 mg) by mouth daily as needed for erectile dysfunction Take 30 min to 4 hours before intercourse.  Never use with nitroglycerin, terazosin or doxazosin. 11/20/14   Michelle Morejon MD   ORDER FOR DME Equipment being ordered: heel lift- MD 11/17/14   Dilan Thomas MD   Elastic Bandage MISC 1 each daily. Use it on the right leg as instructed. 4/4/13   Michelle Morejon MD   STATIN NOT PRESCRIBED, INTENTIONAL, by Other route continuous prn. 2/25/11   Michelle Morejon MD   FISH OIL one capsule daily    Reported, Patient   IRON 325 (65 FE) MG OR TABS 1 TABLET DAILY    Reported, Patient     Current Facility-Administered Medications Ordered in Epic   Medication Dose Route Frequency Last Rate Last Dose     0.45% sodium chloride infusion   Intravenous Continuous 30 mL/hr at 07/06/17 1215       lidocaine (LMX4) cream   Topical Q1H PRN         lidocaine 1 % 1 mL  1 mL Other Q1H PRN         sodium chloride (PF) 0.9% PF flush 3 mL  3 mL Intracatheter  Q1H PRN         sodium chloride (PF) 0.9% PF flush 3 mL  3 mL Intracatheter Q8H         No current Epic-ordered outpatient prescriptions on file.     Wt Readings from Last 1 Encounters:   07/06/17 76.9 kg (169 lb 9.6 oz)     Temp Readings from Last 1 Encounters:   07/06/17 36.5  C (97.7  F) (Oral)     BP Readings from Last 6 Encounters:   07/06/17 115/85   06/26/17 108/72   06/20/17 115/83   06/12/17 120/82   06/09/17 118/78   06/05/17 101/77     Pulse Readings from Last 4 Encounters:   07/06/17 83   06/26/17 92   06/12/17 81   06/09/17 84     Resp Readings from Last 1 Encounters:   07/06/17 18     SpO2 Readings from Last 1 Encounters:   07/06/17 97%     Recent Labs   Lab Test  07/06/17   1140  06/20/17   0945   05/28/15   0828   NA  139   --    --   138   POTASSIUM  4.3  4.2   < >  4.1   CHLORIDE  109   --    --   104   CO2  22   --    --   26   ANIONGAP  8   --    --   8   GLC  95   --    --   96   BUN  17   --    --   22   CR  0.94   --    --   1.04   CHUCKY  8.6   --    --   9.2    < > = values in this interval not displayed.     Recent Labs   Lab Test  07/06/17   1140  11/17/13   0445   WBC  5.4  6.8   HGB  12.9*  14.1   PLT  249  233     Recent Labs   Lab Test  06/05/17   0740   INR  1.18*      RECENT LABS:   ECG:   ECHO:   CXR:

## 2017-07-06 NOTE — ANESTHESIA CARE TRANSFER NOTE
Patient: Charlene Gutierrez    Procedure(s):  ANESTHESIA OUT OF OR ABLATION    Diagnosis: PERSISTENT AFIB  Diagnosis Additional Information: No value filed.    Anesthesia Type:   General     Note:  Airway :Face Mask  Patient transferred to:PACU  Comments: Uneventful transport to PACU   Report to RN Seth MONTERROSO  Exchanging well  Pt responds appropriately to command  IV patent  Lips/teeth/dentition as preop status  Questions answered  BP 86/61  HR 64 nsr  TAT 97.2  RR 16  Sat 100%      Vitals: (Last set prior to Anesthesia Care Transfer)    CRNA VITALS  7/6/2017 1618 - 7/6/2017 1653      7/6/2017             Resp Rate (set): 10                Electronically Signed By: JOSÉ MIGUEL MARCIAL CRNA  July 6, 2017  4:53 PM

## 2017-07-06 NOTE — IP AVS SNAPSHOT
[facility] Observation Unit    [address] [place] [id number]    Phone:  [phone]                                       After Visit Summary   7/6/2017    Charlene Gutierrez    MRN: [id number]           After Visit Summary Signature Page     I have received my discharge instructions, and my questions have been answered. I have discussed any challenges I see with this plan with the nurse or doctor.    ..........................................................................................................................................  Patient/Patient Representative Signature      ..........................................................................................................................................  Patient Representative Print Name and Relationship to Patient    ..................................................               ................................................  Date                                            Time    ..........................................................................................................................................  Reviewed by Signature/Title    ...................................................              ..............................................  Date                                                            Time

## 2017-07-06 NOTE — PROGRESS NOTES
Care Suites Arrival    Reason for Visit: AFIB ABLATION  1130-A/O. Denies pain. Labs WNL. IV flds infusing. All questions & concerns addressed.   Pt resting in bed, denies additional needs at this time, call light within reach. Wife went back to work after speaking with DR Aguayo. Consent signed at this time.  1230 Dr Mcdowell in to see patient.

## 2017-07-06 NOTE — IP AVS SNAPSHOT
MRN:[id number]                      After Visit Summary   7/6/2017    Charlene Gutierrez    MRN: [id number]           Thank you!     Thank you for choosing [facility] for your care. Our goal is always to provide you with excellent care. Hearing back from our patients is one way we can continue to improve our services. Please take a few minutes to complete the written survey that you may receive in the mail after you visit with us. Thank you!        Patient Information     Date Of Birth          1945        About your hospital stay     You were admitted on:  July 6, 2017 [facility]    You were discharged on:  July 7, 2017       Who to Call     For medical emergencies, please call 911.  For non-urgent questions about your medical care, please call your primary care provider or clinic, [phone]  For questions related to your surgery, please call your surgery clinic        Attending Provider     Provider Specialty    Sreekanth Aguayo MD Cardiology       Primary Care Provider Office Phone # Fax #    Ann Irving [phone]      Your next 10 appointments already scheduled     Jul 17, 2017  3:50 PM CDT   [facility] EP RETURN with Jyoti Luna PA-C   [facility] [place] ([facility])    [address] [place] [id number] [phone]            Aug 17, 2017  7:45 AM CDT   P EP RETURN with Sreekanth Aguayo MD   [facility] ([facility])    [address] [place] [id number] [phone]              Further instructions from your care team       A Fib Ablation Discharge Instructions    After you go home:    Have an adult stay with you until tomorrow.    You may eat your normal diet, unless your doctor tells you otherwise.    Relax and take it easy for 3 days.        For 24-48 hours (due to the  sedation you received):    DO NOT DRIVE FOR 2 DAYS!     Do NOT make any important or legal decisions.    Do NOT drive or operate machines at home or at work.    Do NOT drink alcohol.    Care of Puncture Site:    Check the puncture site every 1-2 hours while awake.    For 2-3 days, when you cough, sneeze, laugh or move your bowels, hold your hand over the puncture site and press firmly.    Remove the band aid after 24 hours. If there is minor oozing, apply another band aid and remove it after 12 hours.    It is normal to have a small bruise or pea size lump at the site.    You may shower. Do NOT take a bath, or use a hot tub or pool until groin site heals, which may take up to a week.  Do NOT scrub the site. Do not use lotion or powder near the puncture site.    Activity:    Do NOT lift, push or pull more than 10 pounds for 3 days.    NO repetitive motions such as loading  or vacuuming.     Bleeding:    If you start bleeding from the groin site, lie down flat and press firmly on the site for 10 minutes or until bleeding stops.     Once bleeding stops, lay flat for 2 hours.    Call your A Fib nurse if bleeding does not stop or after hours will need to go to ER.       Go to ER or Call 911 right away if you have heavy bleeding or bleeding that does not stop.    Medications:    Take your medications, including blood thinners, unless your doctor tells you not to.    If you have stopped any other medicines, check with your nurse or provider about when to restart them.    If you have pain, you may take Advil (ibuprofen) or Tylenol (acetaminophen).    Continue Pradaxa 150 mg twice daily until gone then resume Eliquis 5 mg twice daily (I sent this to [facility] as we discussed)    Call the A Fib RN if:    Difficulty swallowing and/or coughing up blood    increased pain or a large or growing hard lump around the site.    Groin site is red, swollen, hot or tender.    Blood or fluid is draining from the groin  "site.    You have chills or a fever greater than 101 F (38 C).    Your leg feels numb, cool or changes color.    If chest or groin pain that is not relieved by Advil or Tylenol.    Recurrent irregular or fast heart rate lasting over 2-3 hours.    Any questions or concerns.    Heart rhythms:  You may have some irregular heartbeats. These feel very strong. They may make you feel that the A Fib is going to start again.  Give it time. The irregular beats should occur less often.    Follow Up Appointments:    Symone Luna 7/17 at 3:50 with EKG    Dr Aguayo 8/17 at 8:15 with EKG     [facility]: A Fib clinic RN's Hailey Sebastian  [phone] (Mon-Fri, 8:00-5:00)   [phone] (7 days a week) after hours for on call Cardiologist.     Pending Results     Date and Time Order Name Status Description    7/6/2017 1755 EKG 12-lead, tracing only Preliminary     7/6/2017 1136 EKG 12-lead, tracing only Preliminary             Statement of Approval     Ordered          07/07/17 0842  I have reviewed and agree with all the recommendations and orders detailed in this document.  EFFECTIVE NOW     Approved and electronically signed by:  Amena Moore APRN CNP             Admission Information     Date & Time Provider Department Dept. Phone    7/6/2017 Sreekanth Aguayo MD [facility] [phone]      Your Vitals Were     Blood Pressure Pulse Temperature Respirations Height Weight    127/79 (BP Location: Left arm) 83 96.5  F (35.8  C) (Oral) 16 1.651 m (5' 5\") 77.1 kg (170 lb)    Pulse Oximetry BMI (Body Mass Index)                98% 28.29 kg/m2          MyChart Information     [employer] gives you secure access to your electronic health record. If you see a primary care provider, you can also send messages to your care team and make appointments. If you have questions, please call your primary care clinic.  If you do not have a primary care provider, please call [phone] and they will " assist you.        Care EveryWhere ID     This is your Care EveryWhere ID. This could be used by other organizations to access your [place] medical records  [id number]        Equal Access to Services     HOLLIE LUIS : Hadii aad ku hadomairareji Brynali, sarada domnaomi, jinta karinada john, eze vela laTyramechelle dumont. So [facility] [phone].    ATENCIÓN: Si habla español, tiene a dejesus disposición servicios gratuitos de asistencia lingüística. Llame al [phone].    We comply with applicable federal civil rights laws and [place] laws. We do not discriminate on the basis of race, color, national origin, age, disability sex, sexual orientation or gender identity.               Review of your medicines      START taking        Dose / Directions    omeprazole 20 MG CR capsule   Commonly known as:  priLOSEC   Used for:  Persistent atrial fibrillation (H)        Dose:  20 mg   Take 1 capsule (20 mg) by mouth daily   Quantity:  30 capsule   Refills:  0         CONTINUE these medicines which may have CHANGED, or have new prescriptions. If we are uncertain of the size of tablets/capsules you have at home, strength may be listed as something that might have changed.        Dose / Directions    apixaban ANTICOAGULANT 5 MG tablet   Commonly known as:  ELIQUIS   This may have changed:  additional instructions   Used for:  Atrial fibrillation, unspecified type (H)        Dose:  5 mg   Take 1 tablet (5 mg) by mouth 2 times daily   Quantity:  180 tablet   Refills:  1         CONTINUE these medicines which have NOT CHANGED        Dose / Directions    * Elastic Bandage Misc   Used for:  Varicose veins        Dose:  1 each   1 each daily. Use it on the right leg as instructed.   Quantity:  1 each   Refills:  1       * JOBST FOR MEN KNEE HIGH/LG Misc   Used for:  Varicose veins        Dose:  1 each   1 each daily Use it on the right leg as instructed   Quantity:  1 each   Refills:  1       FISH OIL        one  capsule daily   Refills:  0       flecainide 100 MG tablet   Commonly known as:  TAMBOCOR        Dose:  100 mg   Take 1 tablet (100 mg) by mouth 2 times daily   Refills:  0       iron 325 (65 FE) MG tablet        1 TABLET DAILY   Refills:  0       metoprolol 25 MG 24 hr tablet   Commonly known as:  TOPROL XL   Used for:  Persistent atrial fibrillation (H)        Dose:  25 mg   Take 1 tablet (25 mg) by mouth daily   Refills:  0       nitroGLYcerin 0.4 MG sublingual tablet   Commonly known as:  NITROSTAT   Used for:  Chest pressure        For chest pain place 1 tablet under the tongue every 5 minutes for 3 doses. If symptoms persist 5 minutes after 1st dose call 911.   Quantity:  25 tablet   Refills:  0       order for DME   Used for:  Achilles tendinosis, right        Equipment being ordered: heel lift- MD   Quantity:  1 Device   Refills:  0       sildenafil 50 MG cap/tab   Commonly known as:  REVATIO/VIAGRA   Used for:  Erectile dysfunction        Dose:  25-50 mg   Take 0.5-1 tablets (25-50 mg) by mouth daily as needed for erectile dysfunction Take 30 min to 4 hours before intercourse.  Never use with nitroglycerin, terazosin or doxazosin.   Quantity:  36 tablet   Refills:  0       STATIN NOT PRESCRIBED (INTENTIONAL)   Used for:  Hypertension goal BP (blood pressure) < 140/90, Hyperlipidemia LDL goal <130        by Other route continuous prn.   Refills:  0       * Notice:  This list has 2 medication(s) that are the same as other medications prescribed for you. Read the directions carefully, and ask your doctor or other care provider to review them with you.      STOP taking     PRADAXA PO                Where to get your medicines      These medications were sent to [facility] [address] [address]     Phone:  [phone]     apixaban ANTICOAGULANT 5 MG tablet         These medications were sent to [facility] - [place] -  [address]  [address] [place] [address]     Phone:  [phone]     omeprazole 20 MG CR capsule                Protect others around you: Learn how to safely use, store and throw away your medicines at www.disposemymeds.org.             Medication List: This is a list of all your medications and when to take them. Check marks below indicate your daily home schedule. Keep this list as a reference.      Medications           Morning Afternoon Evening Bedtime As Needed    apixaban ANTICOAGULANT 5 MG tablet   Commonly known as:  ELIQUIS   Take 1 tablet (5 mg) by mouth 2 times daily                                * Elastic Bandage Misc   1 each daily. Use it on the right leg as instructed.                                * JOBST FOR MEN KNEE HIGH/LG Misc   1 each daily Use it on the right leg as instructed                                FISH OIL   one capsule daily                                flecainide 100 MG tablet   Commonly known as:  TAMBOCOR   Take 1 tablet (100 mg) by mouth 2 times daily   Last time this was given:  100 mg on 7/6/2017  8:20 PM                                iron 325 (65 FE) MG tablet   1 TABLET DAILY                                metoprolol 25 MG 24 hr tablet   Commonly known as:  TOPROL XL   Take 1 tablet (25 mg) by mouth daily                                nitroGLYcerin 0.4 MG sublingual tablet   Commonly known as:  NITROSTAT   For chest pain place 1 tablet under the tongue every 5 minutes for 3 doses. If symptoms persist 5 minutes after 1st dose call 911.                                omeprazole 20 MG CR capsule   Commonly known as:  priLOSEC   Take 1 capsule (20 mg) by mouth daily                                order for DME   Equipment being ordered: heel lift- MD                                sildenafil 50 MG cap/tab   Commonly known as:  REVATIO/VIAGRA   Take 0.5-1 tablets (25-50 mg) by mouth daily as needed for erectile dysfunction Take 30 min to 4 hours  before intercourse.  Never use with nitroglycerin, terazosin or doxazosin.                                STATIN NOT PRESCRIBED (INTENTIONAL)   by Other route continuous prn.                                * Notice:  This list has 2 medication(s) that are the same as other medications prescribed for you. Read the directions carefully, and ask your doctor or other care provider to review them with you.

## 2017-07-06 NOTE — PROGRESS NOTES
Patient is status post Afib/ flutter ablation.  Procedure details are below:    Indication: Symptomatic Afib  Findings: Successful ablation  Estimated blood loss was minimal (< 20 cc)    No immediate complications are apparent.      Sreekanth Aguayo MD    ADDENDUM:  Tried to contact pt's wife on the phone several times after ablation.  However I was not able to reach her and give an update after ablation.

## 2017-07-07 VITALS
SYSTOLIC BLOOD PRESSURE: 127 MMHG | DIASTOLIC BLOOD PRESSURE: 79 MMHG | RESPIRATION RATE: 16 BRPM | OXYGEN SATURATION: 98 % | BODY MASS INDEX: 28.32 KG/M2 | TEMPERATURE: 96.5 F | WEIGHT: 170 LBS | HEIGHT: 65 IN | HEART RATE: 83 BPM

## 2017-07-07 LAB
KCT BLD-ACNC: 206 SEC (ref 105–167)
KCT BLD-ACNC: 218 SEC (ref 105–167)
KCT BLD-ACNC: 254 SEC (ref 105–167)
KCT BLD-ACNC: 284 SEC (ref 105–167)
KCT BLD-ACNC: 335 SEC (ref 105–167)

## 2017-07-07 PROCEDURE — 99213 OFFICE O/P EST LOW 20 MIN: CPT | Performed by: NURSE PRACTITIONER

## 2017-07-07 PROCEDURE — 40000934 ZZH STATISTIC OUTPATIENT (NON-OBS) DAY

## 2017-07-07 PROCEDURE — 93005 ELECTROCARDIOGRAM TRACING: CPT

## 2017-07-07 NOTE — PLAN OF CARE
Problem: Goal Outcome Summary  Goal: Goal Outcome Summary  Outcome: Improving  Arrived to unit around 1800. A&O. Bilateral groin sites and CMS intact. VSS. Tele NSR.  Tolerating clear liquid diet. Bedrest til 2030. Denies pain. Patricia patent.

## 2017-07-07 NOTE — PLAN OF CARE
Problem: Goal Outcome Summary  Goal: Goal Outcome Summary  Outcome: Improving  Pt A&O. VSS, Pt in SR, Pt Groin sites CD/I, Pt voiding, pt stating no pain.  Will continue to monitor.

## 2017-07-07 NOTE — ANESTHESIA POSTPROCEDURE EVALUATION
Patient: Charlene Gutierrez    Procedure(s):  ANESTHESIA OUT OF OR ABLATION    Diagnosis:PERSISTENT AFIB  Diagnosis Additional Information: No value filed.    Anesthesia Type:  General    Note:  Anesthesia Post Evaluation    Patient location during evaluation: PACU  Patient participation: Able to fully participate in evaluation  Level of consciousness: awake and alert  Pain management: satisfactory to patient  Airway patency: patent  Cardiovascular status: hemodynamically stable  Respiratory status: acceptable and unassisted  Hydration status: balanced  PONV: none     Anesthetic complications: None          Last vitals:  Vitals:    07/06/17 1800 07/06/17 1830 07/06/17 1900   BP: 106/65 105/69 (P) 111/70   Pulse:      Resp: 14 12 (P) 14   Temp: 35.3  C (95.5  F) 35.9  C (96.6  F)    SpO2: 100% 97% (P) 98%         Electronically Signed By: Haja Adair MD  July 6, 2017  7:43 PM

## 2017-07-07 NOTE — DISCHARGE INSTRUCTIONS
A Fib Ablation Discharge Instructions    After you go home:    Have an adult stay with you until tomorrow.    You may eat your normal diet, unless your doctor tells you otherwise.    Relax and take it easy for 3 days.        For 24-48 hours (due to the sedation you received):    DO NOT DRIVE FOR 2 DAYS!     Do NOT make any important or legal decisions.    Do NOT drive or operate machines at home or at work.    Do NOT drink alcohol.    Care of Puncture Site:    Check the puncture site every 1-2 hours while awake.    For 2-3 days, when you cough, sneeze, laugh or move your bowels, hold your hand over the puncture site and press firmly.    Remove the band aid after 24 hours. If there is minor oozing, apply another band aid and remove it after 12 hours.    It is normal to have a small bruise or pea size lump at the site.    You may shower. Do NOT take a bath, or use a hot tub or pool until groin site heals, which may take up to a week.  Do NOT scrub the site. Do not use lotion or powder near the puncture site.    Activity:    Do NOT lift, push or pull more than 10 pounds for 3 days.    NO repetitive motions such as loading  or vacuuming.     Bleeding:    If you start bleeding from the groin site, lie down flat and press firmly on the site for 10 minutes or until bleeding stops.     Once bleeding stops, lay flat for 2 hours.    Call your A Fib nurse if bleeding does not stop or after hours will need to go to ER.       Go to ER or Call 911 right away if you have heavy bleeding or bleeding that does not stop.    Medications:    Take your medications, including blood thinners, unless your doctor tells you not to.    If you have stopped any other medicines, check with your nurse or provider about when to restart them.    If you have pain, you may take Advil (ibuprofen) or Tylenol (acetaminophen).    Continue Pradaxa 150 mg twice daily until gone then resume Eliquis 5 mg twice daily (I sent this to [facility]  as we discussed)    Call the A Fib RN if:    Difficulty swallowing and/or coughing up blood    increased pain or a large or growing hard lump around the site.    Groin site is red, swollen, hot or tender.    Blood or fluid is draining from the groin site.    You have chills or a fever greater than 101 F (38 C).    Your leg feels numb, cool or changes color.    If chest or groin pain that is not relieved by Advil or Tylenol.    Recurrent irregular or fast heart rate lasting over 2-3 hours.    Any questions or concerns.    Heart rhythms:  You may have some irregular heartbeats. These feel very strong. They may make you feel that the A Fib is going to start again.  Give it time. The irregular beats should occur less often.    Follow Up Appointments:    Symone Luna 7/17 at 3:50 with EKG    Dr Aguayo 8/17 at 8:15 with EKG     [facility] Care: A Fib clinic RN's Hailey Sebastian  [phone] (Mon-Fri, 8:00-5:00)   [phone] (7 days a week) after hours for on call Cardiologist.

## 2017-07-07 NOTE — PROGRESS NOTES
EP Progress Note          Assessment and Plan:   Mr. Gutierrez is a pleasant 71-year-old gentleman with history of hypertension, hyperlipidemia, and atrial fibrillation. He was placed on a combination of metoprolol and flecainide, but had a recurrence of atrial fibrillation.  He underwent RHETT cardioversion on 06/05/17.  Unfortunately, after the cardioversion, he had recurrence of atrial fibrillation.  Flecainide was then increased to 100 mg b.i.d.  He had a second cardioversion on 06/20/17, however on 06/22 he was again back in atrial fibrillation. EPS and PVI was recommended.    1. Symptomatic PAF despite antiarrhythmic therapy  S/p PVI  and CTI (flutter ablation)  12 lead this morning sinus with 1st degree AVB,  ms  Monitored overnight and pt doing well.     PLAN:  Continue Flecainide 100 mg bid  Metoprolol ER 25 mg qd  Omeprazole 20 mg qd x 1 month  Pradaxa until samples are gone then pt prefers to return to Eliquis 5 mg bid (sent to [facility])      2. CT chest-pulmonary nodules  HX tobacco use, quit 12 years ago       IMPRESSION: Scattered pulmonary nodules throughout the right lung. The largest is at the right lung base between the diaphragm and the right atrium measuring 8 x 4 mm.      3 mm pulmonary nodule in the right upper lobe (series 9, image 19). 3 mm pulmonary nodule in the right upper lobe (series 9, image 19). There is a small amount of fluid in the fissure (series 9, image 21). There is an 8 mm nodular density in the right lung base (series 9, image 33). Dependent atelectasis is seen bilaterally.    Recommendations for an incidental lung nodule > or = 8mm:    Low risk patients: Consider follow-up CT in 3 months, PET/CT, and/or tissue sampling.    High risk patients: Same as for low risk patients.    **Copy of CT findings will be sent to PCP. I did review the findings with the pt and requested he follow up with Dr.Daniela Irving to discuss repeat CT in the future.      Restrictions and  "when to call were reviewed with the pt. OK to d/c home    Amena Moore CNP        Interval History:   Pt feeling well and denies any chest pain, palpitations, difficulty swallowing, or difficulty voiding. Pt had some mild bleeding from his right femoral site yesterday which has resolved. Wt stable, VSSA, and  tele sinus 60's.          Medications:       omeprazole  20 mg Oral QAM AC     metoprolol  25 mg Oral Daily     flecainide  100 mg Oral BID     dabigatran ANTICOAGULANT (PRADAXA) PO  150 mg Oral BID     sodium chloride (PF)  3 mL Intracatheter Q8H             Review of Systems: If done, described below  The Review of Systems is negative other than noted in the HPI             Physical Exam:   Blood pressure 117/79, pulse 83, temperature 96.9  F (36.1  C), temperature source Oral, resp. rate 16, height 1.651 m (5' 5\"), weight 77.1 kg (170 lb), SpO2 99 %.        Vital Sign Ranges  Temperature Temp  Av.7  F (35.9  C)  Min: 95.5  F (35.3  C)  Max: 97.7  F (36.5  C)   Blood pressure Systolic (24hrs), Av , Min:83 , Max:118        Diastolic (24hrs), Av, Min:58, Max:85      Pulse Pulse  Av  Min: 83  Max: 83   Respirations Resp  Av.3  Min: 10  Max: 18   Pulse oximetry SpO2  Av.6 %  Min: 97 %  Max: 100 %         Intake/Output Summary (Last 24 hours) at 17 0746  Last data filed at 17 0700   Gross per 24 hour   Intake             1650 ml   Output             2000 ml   Net             -350 ml       Constitutional:   in no apparent distress     Lungs:   symmetric, clear to auscultation     Cardiovascular:   regular rate and rhythm, normal S1 and S2, no S3, no S4 and no murmur noted     Abdomen:   normal bowel sounds and non-tender     Extremities and Back:   No edema, bilateral femoral sites without hematoma or bruit              Data:     Lab Results   Component Value Date    WBC 5.4 2017    HGB 12.9 (L) 2017    HCT 37.2 (L) 2017     2017     " 07/06/2017    POTASSIUM 4.3 07/06/2017    CHLORIDE 109 07/06/2017    CO2 22 07/06/2017    BUN 17 07/06/2017    CR 0.94 07/06/2017    GLC 95 07/06/2017    TROPI <0.012 11/17/2013    AST 17 05/28/2015    ALT 37 05/28/2015    ALKPHOS 88 05/28/2015    BILITOTAL 0.7 05/28/2015    INR 1.13 07/06/2017

## 2017-07-07 NOTE — PLAN OF CARE
Problem: Goal Outcome Summary  Goal: Goal Outcome Summary  Outcome: No Change  Patient stood and ambulated to bathroom around 2100 with standby assist. Right groin site started bleeding and also scant bleeding from nunez site noted. Back to bed and patient will remain on bedrest for another 2 hrs till 2300.

## 2017-07-07 NOTE — PROGRESS NOTES
Spoke to patient about discharge instructions post A Fib Ablation. Patient made aware that all instructions, appointments and phone numbers have been included in AVS, that will be given to pt on discharge by Nurse. Patient reminded that there is no driving for 2 days and no lifting, pushing or pulling of more than 10 pounds for 3 days. Patient reminded to call with any concerns or problems including, coughing up blood, difficulty swallowing, groin bleed or swolling, increased shortness of breath, fever greater than 101, A Fib lasting longer than 2-3 hours. Pt made aware to call A Fib RN, if follow up appointments need to be changed. Pt has no questions at this time. Arvin

## 2017-07-07 NOTE — PLAN OF CARE
Problem: Goal Outcome Summary  Goal: Goal Outcome Summary  Outcome: Adequate for Discharge Date Met:  07/07/17  Pt discharged to home with wife. Pt verbalizes understanding of all education. Voiding adequate amounts. Groin sites dry and intact. Denies pain. Script filled for omeprazole and sent with pt. Pt prefers to take all meds at home today.

## 2017-07-11 ENCOUNTER — TELEPHONE (OUTPATIENT)
Dept: CARDIOLOGY | Facility: CLINIC | Age: 72
End: 2017-07-11

## 2017-07-11 LAB — INTERPRETATION ECG - MUSE: NORMAL

## 2017-07-11 NOTE — TELEPHONE ENCOUNTER
"Charlene called this morning with a general update on how he has been feeling - he states on the day of discharge that he felt his \"temperature rise a little bit\" as well as he had some chest pain and it was a bit difficult to breathe- he took some tylenol and had relief from that. He says from time to time he will feel his afib return, but it is less than a minute at a time. His groin sites look good - no bleeding or bruising. He states he had a little bit of swelling and put a cold pack and had relief from that. He is scheduled to see Symone on Monday and asked if he could come in sooner- Symone is booked that day and I told him he could not. He knows to call if he has other symptoms, otherwise we will see him next week. No further questions at this time. REJI Edmonds   "

## 2017-07-11 NOTE — TELEPHONE ENCOUNTER
Spoke to pt who is frustrated with the continued going in and out of a fib with rates up to 120's. Pt states that pain and fever that he had after the ablation have gone, but he has the episodes of A Fib 3-4 times or more a day. Pt prior to ablation normal felt fine when in A Fib, but seems to be not as comfortable with the in and out. Pt made aware that this can be part of the healing process of the ablation while the heart is healing, but stated this writer would discuss with Dr Aguayo. Per Dr Aguayo pt may need to be switched from Flecainide to Amiodarone. Symone will see Pt on Thursday at 1130. Pt has been made aware of this date and the potential to change of medication. Arvin

## 2017-07-12 DIAGNOSIS — I10 HYPERTENSION GOAL BP (BLOOD PRESSURE) < 140/90: Primary | ICD-10-CM

## 2017-07-13 ENCOUNTER — OFFICE VISIT (OUTPATIENT)
Dept: CARDIOLOGY | Facility: CLINIC | Age: 72
End: 2017-07-13
Attending: PHYSICIAN ASSISTANT
Payer: COMMERCIAL

## 2017-07-13 VITALS
WEIGHT: 171 LBS | HEART RATE: 66 BPM | DIASTOLIC BLOOD PRESSURE: 80 MMHG | SYSTOLIC BLOOD PRESSURE: 124 MMHG | BODY MASS INDEX: 28.49 KG/M2 | HEIGHT: 65 IN

## 2017-07-13 DIAGNOSIS — I48.91 ATRIAL FIBRILLATION, UNSPECIFIED TYPE (H): ICD-10-CM

## 2017-07-13 DIAGNOSIS — I10 ESSENTIAL HYPERTENSION WITH GOAL BLOOD PRESSURE LESS THAN 140/90: ICD-10-CM

## 2017-07-13 DIAGNOSIS — I48.19 PERSISTENT ATRIAL FIBRILLATION (H): Primary | ICD-10-CM

## 2017-07-13 DIAGNOSIS — E78.5 HYPERLIPIDEMIA LDL GOAL <130: ICD-10-CM

## 2017-07-13 PROCEDURE — 99213 OFFICE O/P EST LOW 20 MIN: CPT | Mod: 25 | Performed by: PHYSICIAN ASSISTANT

## 2017-07-13 PROCEDURE — 93000 ELECTROCARDIOGRAM COMPLETE: CPT | Performed by: PHYSICIAN ASSISTANT

## 2017-07-13 RX ORDER — AMIODARONE HYDROCHLORIDE 200 MG/1
TABLET ORAL
Qty: 60 TABLET | Refills: 1 | Status: SHIPPED | OUTPATIENT
Start: 2017-07-13 | End: 2017-08-01

## 2017-07-13 NOTE — PROGRESS NOTES
"HPI and Plan:   I had the pleasure of seeing Charlene today when he came for concerns regarding palpitations and atrial fibrillation. He is a very pleasant 71-year-old with a history of persistent atrial fibrillation manifested by fatigue dyspnea and lightheadedness. He was first diagnosed with paroxysmal atrial fibrillation in January 2017 and placed on flecainide and metoprolol this was successful in getting him back into sinus rhythm for a time, but in June 2017 he was noted to be back in atrial fibrillation and underwent DC cardioversion. Later that month he was back in atrial fibrillation despite an increased dose of flecainide. He underwent DC cardioversion but unfortunately sinus rhythm was maintained for only 2 days. Therefore he met with Dr. Aguayo and underwent caval tricuspid isthmus ablation and pulmonary vein isolation ×4 on July 6, 2017.    He was discharged the following day on flecainide and metoprolol and omeprazole for one month. He has been maintained on anticoagulation with Eliquis.    He contacted our office on Tuesday noting that he was having intermittent episodes of atrial fibrillation and felt he was \"constantly flipping\" between the 2. With this he experienced dyspnea on exertion and increased fatigue. He states that this had been better over the weekend but Tuesday (the day he called) was his worst day with near constant atrial fibrillation.     He also notes that for the first few days after the procedure he felt \"warm\" but temperature remained under 101 F. He denied chills, cough or dysuria.  He also noted some mild right groin discomfort, which has resolved. He states that for the first 1-2 days he also had pleuritic chest discomfort, but this also has improved. Right now, the only thing bothering him is his palpitations and dyspnea on exertion.    EKG today, which I overread, showed atrial fibrillation at 100 bpm. QRS duration was within normal limits on flecainide.    Of note, a CT chest " done prior to his ablation showed a right lower lobe nodule of 8 mm for which CT scan was recommended in 3 months. Amena Moore NP, who discharged him from the hospital, sent a copy of this to Dr. Irving, his primary care provider for further follow-up.      ASSESSMENT/PLAN:    1. Persistent atrial fibrillation - as above, underwent multiple DC cardioversions and ultimately a CTI and PVI ×4 on July 6.  He was discharged on flecainide, but has complained of intermittent palpitations, feeling like he is going in and out of atrial fibrillation. He is not tolerating this as well as he had previously.    Dr. Aguayo reviewed this and recommended attempt at maintenance of sinus rhythm with amiodarone in lieu of flecainide. He is agreeable to proceed. I explained that amiodarone has a higher chance of toxicities when used long-term, but we are planning to use it only for the short-term. He will start with 400 mg (2 tablets) twice daily for 3 days. He will then take 400 mg daily for one week, and will then start 200 mg daily on 724. He will stay on this until he sees Dr. Aguayo on August 17.     He will continue anticoagulation with Eliquis.    I have asked him to contact us tomorrow if he is feeling worse, but otherwise to contact us Monday with an update. He is to call us right away should he get fever greater than 101 F.      2. Pulmonary Nodule - Has a history of tobacco use and quit 12 years ago       IMPRESSION: Scattered pulmonary nodules throughout the right lung. The largest is at the right lung base between the diaphragm and the right atrium measuring 8 x 4 mm.       3 mm pulmonary nodule in the right upper lobe (series 9, image 19). 3 mm pulmonary nodule in the right upper lobe (series 9, image 19). There is a small amount of fluid in the fissure (series 9, image 21). There is an 8 mm nodular density in the right lung base (series 9, image 33). Dependent atelectasis is seen bilaterally.     Recommendations for an  incidental lung nodule > or = 8mm:    Low risk patients: Consider follow-up CT in 3 months, PET/CT, and/or tissue sampling.    High risk patients: Same as for low risk patients.     I have given Charlene a copy of this for his records, and will also send a copy to Dr. Irving.      Orders Placed This Encounter   Procedures     EKG 12-lead complete w/read - Clinics (performed today)       Orders Placed This Encounter   Medications     amiodarone (PACERONE/CODARONE) 200 MG tablet     Sig: Take 2 tablets (400 mg) twice day x 3 days (7/14-7/16), then 2 tablets (400 mg) daily x 1 week (7/17-7/23) and then 200 mg daily     Dispense:  60 tablet     Refill:  1     Replaces flecainide       Medications Discontinued During This Encounter   Medication Reason     flecainide (TAMBOCOR) 100 MG tablet        Encounter Diagnoses   Name Primary?     Persistent atrial fibrillation (H) Yes     Essential hypertension with goal blood pressure less than 140/90      Hyperlipidemia LDL goal <130      Atrial fibrillation, unspecified type (H)        CURRENT MEDICATIONS:  Current Outpatient Prescriptions   Medication Sig Dispense Refill     amiodarone (PACERONE/CODARONE) 200 MG tablet Take 2 tablets (400 mg) twice day x 3 days (7/14-7/16), then 2 tablets (400 mg) daily x 1 week (7/17-7/23) and then 200 mg daily 60 tablet 1     apixaban ANTICOAGULANT (ELIQUIS) 5 MG tablet Take 1 tablet (5 mg) by mouth 2 times daily 180 tablet 1     omeprazole (PRILOSEC) 20 MG CR capsule Take 1 capsule (20 mg) by mouth daily 30 capsule 0     nitroglycerin (NITROSTAT) 0.4 MG sublingual tablet For chest pain place 1 tablet under the tongue every 5 minutes for 3 doses. If symptoms persist 5 minutes after 1st dose call 911. 25 tablet 0     metoprolol (TOPROL XL) 25 MG 24 hr tablet Take 1 tablet (25 mg) by mouth daily       sildenafil (VIAGRA) 50 MG tablet Take 0.5-1 tablets (25-50 mg) by mouth daily as needed for erectile dysfunction Take 30 min to 4 hours before  intercourse.  Never use with nitroglycerin, terazosin or doxazosin. 36 tablet 0     FISH OIL one capsule daily       IRON 325 (65 FE) MG OR TABS 1 TABLET DAILY       Elastic Bandages & Supports (JOBST FOR MEN KNEE HIGH/LG) MISC 1 each daily Use it on the right leg as instructed 1 each 1     ORDER FOR DME Equipment being ordered: heel lift- MD 1 Device 0     Elastic Bandage MISC 1 each daily. Use it on the right leg as instructed. 1 each 1     STATIN NOT PRESCRIBED, INTENTIONAL, by Other route continuous prn.  0       ALLERGIES   No Known Allergies    PAST MEDICAL HISTORY:  Past Medical History:   Diagnosis Date     Atrial fibrillation (H) 11/17/2013     CKD (chronic kidney disease) 2012     Gastric ulcer      HTN (hypertension), benign      Hyperlipidaemia        PAST SURGICAL HISTORY:  Past Surgical History:   Procedure Laterality Date     ANESTHESIA CARDIOVERSION N/A 6/5/2017    Procedure: ANESTHESIA CARDIOVERSION;  ANESTHESIA NEEDED FOR CARDIOVERSION IN CARE SUITES. (RHETT AT 0830);  Surgeon: GENERIC ANESTHESIA PROVIDER;  Location: [facility]     BRONCHOSCOPY       C INCISION OF PYLORIC MUSCLE       CARDIOVERSION  11/2013     COLONOSCOPY       EXTRACAPSULAR CATARACT EXTRATION WITH INTRAOCULAR LENS IMPLANT  2003    right eye       FAMILY HISTORY:  Family History   Problem Relation Age of Onset     CANCER Mother      CANCER Father      Colon Cancer Father      Prostate Cancer No family hx of        SOCIAL HISTORY:  Social History     Social History     Marital status:      Spouse name: N/A     Number of children: N/A     Years of education: N/A     Social History Main Topics     Smoking status: Never Smoker     Smokeless tobacco: Never Used     Alcohol use Yes      Comment: ocass     Drug use: No     Sexual activity: Yes     Partners: Female     Other Topics Concern     Parent/Sibling W/ Cabg, Mi Or Angioplasty Before 65f 55m? No      Service Yes     Blood Transfusions No     Caffeine Concern No      "Occupational Exposure No     Hobby Hazards No     Sleep Concern No     Stress Concern No     Weight Concern No     Special Diet No     Back Care No     Exercise Yes     Bike Helmet No     Seat Belt Yes     Self-Exams No     Social History Narrative       Review of Systems:  Skin:  Negative       Eyes:  Positive for glasses    ENT:  Negative      Respiratory:  Negative for cough;shortness of breath;dyspnea at rest See HPI   Cardiovascular:  chest pain;syncope or near-syncope;lightheadedness;dizziness;cyanosis;fatigue;Negative for Positive for;palpitations feels irregular pulse. See HPI  Gastroenterology: Negative for melena;hematochezia;dysphagia    Genitourinary:  Negative for dysuria    Musculoskeletal:  Positive for arthritis    Neurologic:  Negative for speech problems;headaches;local weakness;paralysis;incoordination    Psychiatric:  Negative      Heme/Lymph/Imm:  Negative      Endocrine:  Negative        Physical Exam:  Vitals: /80  Pulse 66  Ht 1.651 m (5' 5\")  Wt 77.6 kg (171 lb)  BMI 28.46 kg/m2    Constitutional:  cooperative, alert and oriented, well developed, well nourished, in no acute distress        Skin:  warm and dry to the touch, no apparent skin lesions or masses noted        Head:  normocephalic, no masses or lesions        Eyes:  conjunctivae and lids unremarkable;sclera white;pupils equal and round;no xanthalasma        ENT:  no pallor or cyanosis, dentition good        Neck:  JVP normal;no carotid bruit        Chest:  normal breath sounds, clear to auscultation, normal A-P diameter, normal symmetry, normal respiratory excursion, no use of accessory muscles          Cardiac: no murmurs, gallops or rubs detected irregular rhythm                Abdomen:  abdomen soft        Vascular: pulses full and equal                               right femoral bruit (-) left femoral bruit (-) no tenderness     Extremities and Back:  no deformities, clubbing, cyanosis, erythema observed;no edema     "          Neurological:  affect appropriate, oriented to time, person and place              CC  [facility]  [address] [place] [address]

## 2017-07-13 NOTE — LETTER
"7/13/2017    Ann Irving  [facility]  [address] [place] [address]    RE: Charlene Gutierrez       Dear Colleague,    I had the pleasure of seeing Charlene Gutierrez in the [facility].    HPI and Plan:   I had the pleasure of seeing Charlene today when he came for concerns regarding palpitations and atrial fibrillation. He is a very pleasant 71-year-old with a history of persistent atrial fibrillation manifested by fatigue dyspnea and lightheadedness. He was first diagnosed with paroxysmal atrial fibrillation in January 2017 and placed on flecainide and metoprolol this was successful in getting him back into sinus rhythm for a time, but in June 2017 he was noted to be back in atrial fibrillation and underwent DC cardioversion. Later that month he was back in atrial fibrillation despite an increased dose of flecainide. He underwent DC cardioversion but unfortunately sinus rhythm was maintained for only 2 days. Therefore he met with Dr. Aguayo and underwent caval tricuspid isthmus ablation and pulmonary vein isolation ×4 on July 6, 2017.    He was discharged the following day on flecainide and metoprolol and omeprazole for one month. He has been maintained on anticoagulation with Eliquis.    He contacted our office on Tuesday noting that he was having intermittent episodes of atrial fibrillation and felt he was \"constantly flipping\" between the 2. With this he experienced dyspnea on exertion and increased fatigue. He states that this had been better over the weekend but Tuesday (the day he called) was his worst day with near constant atrial fibrillation.     He also notes that for the first few days after the procedure he felt \"warm\" but temperature remained under 101 F. He denied chills, cough or dysuria.  He also noted some mild right groin discomfort, which has resolved. He states that for the first 1-2 days he also had pleuritic chest discomfort, but this " also has improved. Right now, the only thing bothering him is his palpitations and dyspnea on exertion.    EKG today, which I overread, showed atrial fibrillation at 100 bpm. QRS duration was within normal limits on flecainide.    Of note, a CT chest done prior to his ablation showed a right lower lobe nodule of 8 mm for which CT scan was recommended in 3 months. Amena Moore NP, who discharged him from the hospital, sent a copy of this to Dr. Irving, his primary care provider for further follow-up.      ASSESSMENT/PLAN:    1. Persistent atrial fibrillation - as above, underwent multiple DC cardioversions and ultimately a CTI and PVI ×4 on July 6.  He was discharged on flecainide, but has complained of intermittent palpitations, feeling like he is going in and out of atrial fibrillation. He is not tolerating this as well as he had previously.    Dr. Aguayo reviewed this and recommended attempt at maintenance of sinus rhythm with amiodarone in lieu of flecainide. He is agreeable to proceed. I explained that amiodarone has a higher chance of toxicities when used long-term, but we are planning to use it only for the short-term. He will start with 400 mg (2 tablets) twice daily for 3 days. He will then take 400 mg daily for one week, and will then start 200 mg daily on 724. He will stay on this until he sees Dr. Aguayo on August 17.     He will continue anticoagulation with Eliquis.    I have asked him to contact us tomorrow if he is feeling worse, but otherwise to contact us Monday with an update. He is to call us right away should he get fever greater than 101 F.      2. Pulmonary Nodule - Has a history of tobacco use and quit 12 years ago       IMPRESSION: Scattered pulmonary nodules throughout the right lung. The largest is at the right lung base between the diaphragm and the right atrium measuring 8 x 4 mm.       3 mm pulmonary nodule in the right upper lobe (series 9, image 19). 3 mm pulmonary nodule in the right  upper lobe (series 9, image 19). There is a small amount of fluid in the fissure (series 9, image 21). There is an 8 mm nodular density in the right lung base (series 9, image 33). Dependent atelectasis is seen bilaterally.     Recommendations for an incidental lung nodule > or = 8mm:    Low risk patients: Consider follow-up CT in 3 months, PET/CT, and/or tissue sampling.    High risk patients: Same as for low risk patients.     I have given Charlene a copy of this for his records, and will also send a copy to Dr. Irving.      Orders Placed This Encounter   Procedures     EKG 12-lead complete w/read - Clinics (performed today)       Orders Placed This Encounter   Medications     amiodarone (PACERONE/CODARONE) 200 MG tablet     Sig: Take 2 tablets (400 mg) twice day x 3 days (7/14-7/16), then 2 tablets (400 mg) daily x 1 week (7/17-7/23) and then 200 mg daily     Dispense:  60 tablet     Refill:  1     Replaces flecainide       Medications Discontinued During This Encounter   Medication Reason     flecainide (TAMBOCOR) 100 MG tablet        Encounter Diagnoses   Name Primary?     Persistent atrial fibrillation (H) Yes     Essential hypertension with goal blood pressure less than 140/90      Hyperlipidemia LDL goal <130      Atrial fibrillation, unspecified type (H)        CURRENT MEDICATIONS:  Current Outpatient Prescriptions   Medication Sig Dispense Refill     amiodarone (PACERONE/CODARONE) 200 MG tablet Take 2 tablets (400 mg) twice day x 3 days (7/14-7/16), then 2 tablets (400 mg) daily x 1 week (7/17-7/23) and then 200 mg daily 60 tablet 1     apixaban ANTICOAGULANT (ELIQUIS) 5 MG tablet Take 1 tablet (5 mg) by mouth 2 times daily 180 tablet 1     omeprazole (PRILOSEC) 20 MG CR capsule Take 1 capsule (20 mg) by mouth daily 30 capsule 0     nitroglycerin (NITROSTAT) 0.4 MG sublingual tablet For chest pain place 1 tablet under the tongue every 5 minutes for 3 doses. If symptoms persist 5 minutes after 1st dose call  911. 25 tablet 0     metoprolol (TOPROL XL) 25 MG 24 hr tablet Take 1 tablet (25 mg) by mouth daily       sildenafil (VIAGRA) 50 MG tablet Take 0.5-1 tablets (25-50 mg) by mouth daily as needed for erectile dysfunction Take 30 min to 4 hours before intercourse.  Never use with nitroglycerin, terazosin or doxazosin. 36 tablet 0     FISH OIL one capsule daily       IRON 325 (65 FE) MG OR TABS 1 TABLET DAILY       Elastic Bandages & Supports (JOBST FOR MEN KNEE HIGH/LG) MISC 1 each daily Use it on the right leg as instructed 1 each 1     ORDER FOR DME Equipment being ordered: heel lift- MD 1 Device 0     Elastic Bandage MISC 1 each daily. Use it on the right leg as instructed. 1 each 1     STATIN NOT PRESCRIBED, INTENTIONAL, by Other route continuous prn.  0       ALLERGIES   No Known Allergies    PAST MEDICAL HISTORY:  Past Medical History:   Diagnosis Date     Atrial fibrillation (H) 11/17/2013     CKD (chronic kidney disease) 2012     Gastric ulcer      HTN (hypertension), benign      Hyperlipidaemia        PAST SURGICAL HISTORY:  Past Surgical History:   Procedure Laterality Date     ANESTHESIA CARDIOVERSION N/A 6/5/2017    Procedure: ANESTHESIA CARDIOVERSION;  ANESTHESIA NEEDED FOR CARDIOVERSION IN CARE SUITES. (RHETT AT 0830);  Surgeon: GENERIC ANESTHESIA PROVIDER;  Location: [facility]     BRONCHOSCOPY       C INCISION OF PYLORIC MUSCLE       CARDIOVERSION  11/2013     COLONOSCOPY       EXTRACAPSULAR CATARACT EXTRATION WITH INTRAOCULAR LENS IMPLANT  2003    right eye       FAMILY HISTORY:  Family History   Problem Relation Age of Onset     CANCER Mother      CANCER Father      Colon Cancer Father      Prostate Cancer No family hx of        SOCIAL HISTORY:  Social History     Social History     Marital status:      Spouse name: N/A     Number of children: N/A     Years of education: N/A     Social History Main Topics     Smoking status: Never Smoker     Smokeless tobacco: Never Used     Alcohol use Yes       "Comment: ocass     Drug use: No     Sexual activity: Yes     Partners: Female     Other Topics Concern     Parent/Sibling W/ Cabg, Mi Or Angioplasty Before 65f 55m? No      Service Yes     Blood Transfusions No     Caffeine Concern No     Occupational Exposure No     Hobby Hazards No     Sleep Concern No     Stress Concern No     Weight Concern No     Special Diet No     Back Care No     Exercise Yes     Bike Helmet No     Seat Belt Yes     Self-Exams No     Social History Narrative       Review of Systems:  Skin:  Negative       Eyes:  Positive for glasses    ENT:  Negative      Respiratory:  Negative for cough;shortness of breath;dyspnea at rest See HPI   Cardiovascular:  chest pain;syncope or near-syncope;lightheadedness;dizziness;cyanosis;fatigue;Negative for Positive for;palpitations feels irregular pulse. See HPI  Gastroenterology: Negative for melena;hematochezia;dysphagia    Genitourinary:  Negative for dysuria    Musculoskeletal:  Positive for arthritis    Neurologic:  Negative for speech problems;headaches;local weakness;paralysis;incoordination    Psychiatric:  Negative      Heme/Lymph/Imm:  Negative      Endocrine:  Negative        Physical Exam:  Vitals: /80  Pulse 66  Ht 1.651 m (5' 5\")  Wt 77.6 kg (171 lb)  BMI 28.46 kg/m2    Constitutional:  cooperative, alert and oriented, well developed, well nourished, in no acute distress        Skin:  warm and dry to the touch, no apparent skin lesions or masses noted        Head:  normocephalic, no masses or lesions        Eyes:  conjunctivae and lids unremarkable;sclera white;pupils equal and round;no xanthalasma        ENT:  no pallor or cyanosis, dentition good        Neck:  JVP normal;no carotid bruit        Chest:  normal breath sounds, clear to auscultation, normal A-P diameter, normal symmetry, normal respiratory excursion, no use of accessory muscles          Cardiac: no murmurs, gallops or rubs detected irregular rhythm            "     Abdomen:  abdomen soft        Vascular: pulses full and equal                               right femoral bruit (-) left femoral bruit (-) no tenderness     Extremities and Back:  no deformities, clubbing, cyanosis, erythema observed;no edema              Neurological:  affect appropriate, oriented to time, person and place          Thank you for allowing me to participate in the care of your patient.    Sincerely,     Jyoti Luna PA-C     [facility]

## 2017-07-13 NOTE — PATIENT INSTRUCTIONS
1. As you continue to flip back and forth between normal rhythm and AFib, will switch flecainide to something stronger   Amiodarone 200 mg tablets sent to  [facility]    Take 2 tablets (400 mg) twice day x 3 days (7/14-7/16) (OK to start tonight with 2 tabs)    Then 2 tablets (400 mg) daily x 1 week (7/17-7/23)     then 200 mg daily starting 7/24.    2. STOP flecainide    3. Continue all other medications.    4. If feeling WORSE, call Hailey tomorrow.  If feeling the same or better, don't need to call until Monday 7/17.  Hailey/Jaz: [phone]

## 2017-07-13 NOTE — MR AVS SNAPSHOT
After Visit Summary   7/13/2017    Charlene Gutierrez    MRN: [id number]           Patient Information     Date Of Birth          1945        Visit Information        Provider Department      7/13/2017 11:30 AM Jyoti Luna PA-C [facility]        Today's Diagnoses     Persistent atrial fibrillation (H)    -  1    Essential hypertension with goal blood pressure less than 140/90        Hyperlipidemia LDL goal <130          Care Instructions    1. As you continue to flip back and forth between normal rhythm and AFib, will switch flecainide to something stronger   Amiodarone 200 mg tablets sent to  [facility]    Take 2 tablets (400 mg) twice day x 3 days (7/14-7/16) (OK to start tonight with 2 tabs)    Then 2 tablets (400 mg) daily x 1 week (7/17-7/23)     then 200 mg daily starting 7/24.    2. STOP flecainide    3. Continue all other medications.    4. If feeling WORSE, call Hailey tomorrow.  If feeling the same or better, don't need to call until Monday 7/17.  Hailey/Jaz: [phone]          Follow-ups after your visit        Your next 10 appointments already scheduled     Aug 17, 2017  7:45 AM CDT   [facility] EP RETURN with Sreekanth Aguayo MD   [facility] ([facility])    [address] [place] [id number] [phone]              Who to contact     If you have questions or need follow up information about today's clinic visit or your schedule please contact [facility] directly at [phone].  Normal or non-critical lab and imaging results will be communicated to you by MyChart, letter or phone within 4 business days after the clinic has received the results. If you do not hear from us within 7 days, please contact the clinic through MyChart or phone. If you have a critical or abnormal lab result, we will notify you by phone  "as soon as possible.  Submit refill requests through [employer] or call your pharmacy and they will forward the refill request to us. Please allow 3 business days for your refill to be completed.          Additional Information About Your Visit        [employer] Information     [employer] gives you secure access to your electronic health record. If you see a primary care provider, you can also send messages to your care team and make appointments. If you have questions, please call your primary care clinic.  If you do not have a primary care provider, please call [phone] and they will assist you.        Care EveryWhere ID     This is your Care EveryWhere ID. This could be used by other organizations to access your [facility] medical records  [id number]        Your Vitals Were     Pulse Height BMI (Body Mass Index)             66 1.651 m (5' 5\") 28.46 kg/m2          Blood Pressure from Last 3 Encounters:   07/13/17 124/80   07/07/17 127/79   06/26/17 108/72    Weight from Last 3 Encounters:   07/13/17 77.6 kg (171 lb)   07/07/17 77.1 kg (170 lb)   06/26/17 78.3 kg (172 lb 9.6 oz)              We Performed the Following     EKG 12-lead complete w/read - Clinics (performed today)     Follow-Up with Cardiac Advanced Practice Provider          Today's Medication Changes          These changes are accurate as of: 7/13/17 12:13 PM.  If you have any questions, ask your nurse or doctor.               Start taking these medicines.        Dose/Directions    amiodarone 200 MG tablet   Commonly known as:  PACERONE/CODARONE   Used for:  Persistent atrial fibrillation (H)   Started by:  Jyoti Luna PA-C        Take 2 tablets (400 mg) twice day x 3 days (7/14-7/16), then 2 tablets (400 mg) daily x 1 week (7/17-7/23) and then 200 mg daily   Quantity:  60 tablet   Refills:  1         Stop taking these medicines if you haven't already. Please contact your care team if you have questions.     flecainide 100 MG tablet "   Commonly known as:  TAMBOCOR   Stopped by:  Jyoti Luna PA-C                Where to get your medicines      These medications were sent to [facility] [place] - [address]  [address], [place] [address]     Phone:  [phone]     amiodarone 200 MG tablet                Primary Care Provider Office Phone # Fax #    Ann Irving [phone]       [facility] [address] [place] [address]        Equal Access to Services     [facility]: Hadii aad ku hadasho Soomaali, waaxda luqadaha, qaybta kaalmada adeegyada, waxay idiin hayaan adejared kharash laleon . So [facility] [phone].    ATENCIÓN: Si habla español, tiene a dejesus disposición servicios gratuitos de asistencia lingüística. [facility] [phone].    We comply with applicable federal civil rights laws and [place] laws. We do not discriminate on the basis of race, color, national origin, age, disability sex, sexual orientation or gender identity.            Thank you!     Thank you for choosing [facility] [place]  for your care. Our goal is always to provide you with excellent care. Hearing back from our patients is one way we can continue to improve our services. Please take a few minutes to complete the written survey that you may receive in the mail after your visit with us. Thank you!             Your Updated Medication List - Protect others around you: Learn how to safely use, store and throw away your medicines at www.disposemymeds.org.          This list is accurate as of: 7/13/17 12:13 PM.  Always use your most recent med list.                   Brand Name Dispense Instructions for use Diagnosis    amiodarone 200 MG tablet    PACERONE/CODARONE    60 tablet    Take 2 tablets (400 mg) twice day x 3 days (7/14-7/16), then 2 tablets (400 mg) daily x 1 week (7/17-7/23) and then 200 mg daily    Persistent  atrial fibrillation (H)       apixaban ANTICOAGULANT 5 MG tablet    ELIQUIS    180 tablet    Take 1 tablet (5 mg) by mouth 2 times daily    Atrial fibrillation, unspecified type (H)       * Elastic Bandage Misc     1 each    1 each daily. Use it on the right leg as instructed.    Varicose veins       * JOBST FOR MEN KNEE HIGH/LG Misc     1 each    1 each daily Use it on the right leg as instructed    Varicose veins       FISH OIL      one capsule daily        iron 325 (65 FE) MG tablet      1 TABLET DAILY        metoprolol 25 MG 24 hr tablet    TOPROL XL     Take 1 tablet (25 mg) by mouth daily    Persistent atrial fibrillation (H)       nitroGLYcerin 0.4 MG sublingual tablet    NITROSTAT    25 tablet    For chest pain place 1 tablet under the tongue every 5 minutes for 3 doses. If symptoms persist 5 minutes after 1st dose call 911.    Chest pressure       omeprazole 20 MG CR capsule    priLOSEC    30 capsule    Take 1 capsule (20 mg) by mouth daily    Persistent atrial fibrillation (H)       order for DME     1 Device    Equipment being ordered: heel lift- MD    Achilles tendinosis, right       sildenafil 50 MG cap/tab    REVATIO/VIAGRA    36 tablet    Take 0.5-1 tablets (25-50 mg) by mouth daily as needed for erectile dysfunction Take 30 min to 4 hours before intercourse.  Never use with nitroglycerin, terazosin or doxazosin.    Erectile dysfunction       STATIN NOT PRESCRIBED (INTENTIONAL)      by Other route continuous prn.    Hypertension goal BP (blood pressure) < 140/90, Hyperlipidemia LDL goal <130       * Notice:  This list has 2 medication(s) that are the same as other medications prescribed for you. Read the directions carefully, and ask your doctor or other care provider to review them with you.

## 2017-07-14 LAB — INTERPRETATION ECG - MUSE: NORMAL

## 2017-07-18 ENCOUNTER — TELEPHONE (OUTPATIENT)
Dept: CARDIOLOGY | Facility: CLINIC | Age: 72
End: 2017-07-18

## 2017-07-18 NOTE — TELEPHONE ENCOUNTER
Let's load him with higher dose of amiodarone for a bit longer.  When I saw him 7/13, I rec'd 400 mg (2 tablets) twice daily for 3 days (7/14-7/16), then 400 mg daily for one week (7/17-7/23) and then 200 mg daily on 7/24.     Pls have him go back up to 400 mg BID (2 200 mg tabs) starting today 7/18 -7/20.  On 7/21, go to 400 mg daily (7/22-7/28) and then 200 mg daily starting 7/29. Pls update EPIC and send new Rx if needed. He sees Dr. Aguayo 8/17, who can give long term Rx depending on how long he'll stay on it.    Glad he's feeling more energetic.     Unsure of why temps are up slightly but usually only worried if >101 F (38.3 C). Any other sxs of infection (dysuria, cough, diarrhea, n/v, difficulty swallowing? Severe CP? VYAS??). I'll run this by Dr. Aguayo, but no other tests/changes right now.    Joshua galeas

## 2017-07-18 NOTE — TELEPHONE ENCOUNTER
Charlene called this morning with an update on how he is doing since switching to amiodarone. He states that he has more energy than he did last week, but otherwise feels no different. He is still reporting several episodes of afib per day. He also states that he is still febrile - he will wake up in the morning with temps around  and they will increase throughout the day, sometimes reaching 101, but then they start to fall again by evening/night time. He has taken Tylenol a couple times per day, but it doesn't help too much. I told Charlene I would update Symone with this information and let him know recommendations. REJI Edmonds

## 2017-07-18 NOTE — TELEPHONE ENCOUNTER
I called Charlene to update him with Symone's recommendations. He did not have a pen on him to write this down, but stated that he had a good memory. He did repeat the directions back to me twice and verbalized understanding. He denies any other symptoms of infection. No further questions at this time. REJI Emdonds

## 2017-08-01 DIAGNOSIS — I48.19 PERSISTENT ATRIAL FIBRILLATION (H): ICD-10-CM

## 2017-08-01 DIAGNOSIS — I48.91 ATRIAL FIBRILLATION, UNSPECIFIED TYPE (H): ICD-10-CM

## 2017-08-01 RX ORDER — METOPROLOL SUCCINATE 25 MG/1
25 TABLET, EXTENDED RELEASE ORAL DAILY
Qty: 30 TABLET | Refills: 3 | Status: SHIPPED | OUTPATIENT
Start: 2017-08-01 | End: 2017-09-20

## 2017-08-01 RX ORDER — AMIODARONE HYDROCHLORIDE 200 MG/1
TABLET ORAL
Qty: 30 TABLET | Refills: 0 | Status: SHIPPED | OUTPATIENT
Start: 2017-08-01 | End: 2017-08-21

## 2017-08-11 ENCOUNTER — TRANSFERRED RECORDS (OUTPATIENT)
Dept: HEALTH INFORMATION MANAGEMENT | Facility: CLINIC | Age: 72
End: 2017-08-11

## 2017-08-15 ENCOUNTER — TELEPHONE (OUTPATIENT)
Dept: LAB | Facility: CLINIC | Age: 72
End: 2017-08-15

## 2017-08-15 ASSESSMENT — CHADS2 SCORE
DIABETES: NO
VASCULAR DISEASE: NO
SEX: MALE
STROK/TIA/THROM: NO
HTN: YES
CHF: NO
AGE: 65-74

## 2017-08-17 ENCOUNTER — OFFICE VISIT (OUTPATIENT)
Dept: CARDIOLOGY | Facility: CLINIC | Age: 72
End: 2017-08-17
Payer: COMMERCIAL

## 2017-08-17 VITALS
HEIGHT: 65 IN | HEART RATE: 72 BPM | WEIGHT: 166 LBS | DIASTOLIC BLOOD PRESSURE: 78 MMHG | BODY MASS INDEX: 27.66 KG/M2 | SYSTOLIC BLOOD PRESSURE: 124 MMHG

## 2017-08-17 DIAGNOSIS — I48.19 PERSISTENT ATRIAL FIBRILLATION (H): ICD-10-CM

## 2017-08-17 DIAGNOSIS — I48.19 PERSISTENT ATRIAL FIBRILLATION (H): Primary | ICD-10-CM

## 2017-08-17 LAB
ALBUMIN SERPL-MCNC: 3.5 G/DL (ref 3.4–5)
ALP SERPL-CCNC: 80 U/L (ref 40–150)
ALT SERPL W P-5'-P-CCNC: 45 U/L (ref 0–70)
AST SERPL W P-5'-P-CCNC: 25 U/L (ref 0–45)
BILIRUB DIRECT SERPL-MCNC: 0.2 MG/DL (ref 0–0.2)
BILIRUB SERPL-MCNC: 0.7 MG/DL (ref 0.2–1.3)
PROT SERPL-MCNC: 7.1 G/DL (ref 6.8–8.8)
TSH SERPL DL<=0.005 MIU/L-ACNC: 1.92 MU/L (ref 0.4–4)

## 2017-08-17 PROCEDURE — 80076 HEPATIC FUNCTION PANEL: CPT | Performed by: INTERNAL MEDICINE

## 2017-08-17 PROCEDURE — 99214 OFFICE O/P EST MOD 30 MIN: CPT | Mod: 25 | Performed by: INTERNAL MEDICINE

## 2017-08-17 PROCEDURE — 93000 ELECTROCARDIOGRAM COMPLETE: CPT | Performed by: INTERNAL MEDICINE

## 2017-08-17 PROCEDURE — 84443 ASSAY THYROID STIM HORMONE: CPT | Performed by: INTERNAL MEDICINE

## 2017-08-17 PROCEDURE — 36415 COLL VENOUS BLD VENIPUNCTURE: CPT | Performed by: INTERNAL MEDICINE

## 2017-08-17 NOTE — PROGRESS NOTES
REASON FOR VISIT:  Evaluation of persistent atrial fibrillation.      HISTORY OF PRESENT ILLNESS:  Mr. Gutierrez is a pleasant 71-year-old gentleman with a history of hypertension, hyperlipidemia and atrial fibrillation who is here for evaluation.      The patient failed rhythm control strategy with flecainide and cardioversion.  He underwent pulmonary vein isolation on 07/06.  Unfortunately, on 07/13, he had recurrence of atrial fibrillation.  He underwent another cardioversion which was successful; however, he had again recurrence of atrial fibrillation.  Finally, flecainide was discontinued and he was loaded with amiodarone.      At the moment, he is doing well.  He informs that immediately after the ablation, he did not feel himself.  He was very fatigued and also experienced some subjective fevers.  However, his symptoms improved and he is now back to normal.      He continues to be in atrial fibrillation.  He believes he has been in atrial fibrillation for the last week.  He denies any other symptoms such as chest pain, shortness of breath, chills, fever, lightheadedness, near-syncope or syncopal episode. He actually affirms that he can tolerate Afib better this time and heart rate seems to be better control on Amiodarone.     EKG done here today confirmed atrial fibrillation with controlled ventricular response, heart rate at 90 beats per minute.  He is currently taking a combination of amiodarone and metoprolol.      ASSESSMENT AND PLAN:   1.  Recurrent persistent atrial fibrillation.  Failed therapy with flecainide.  He underwent ablation and had recurrence of atrial fibrillation.  He is still in the healing phase of the ablation.  I recommend continuing amiodarone and pursue another cardioversion.      After our discussion, he is not interested in pursuing cardioversion.  He is discouraged by the fact that he already had 2 cardioversions and atrial fibrillation recurred.  I explained that he is now taking a  different medication.  After our discussion, he would like to think about it.  We will follow up with him over the phone to make the decision whether or not he should go to cardioversion.     If he decides not to have a cardioversion done, I will keep him on amiodarone for another month as [place] is leaving the [place].  When he gets back, he will follow up with me to possibly discontinue amiodarone and continue rate control.     Of note, he had recent labs including BMP, which revealed a creatinine of 1.3.  Otherwise, BMP was within normal limits.  He also had a CBC, which was within normal limits (WBC 5.5, hemoglobin 13, hematocrit 40.3, platelets 288).     2.  Embolic prevention.  CHADS-VASc score of 2.  Continue anticoagulation with Eliquis indefinitely.   3.  Immunotherapy.  I recommend checking LFTs and PFTs.  He already had a chest CT and he actually was found to have nodules and will have a repeat chest CT at some point.   4.  Hypertension:  Blood pressure is well controlled.         MERE FARFAN MD             D: 2017 08:36   T: 2017 10:37   MT: RUSTY      Name:     ISIAH MERINO   MRN:      [id number]        Account:      [id number]   :      1945           Service Date: 2017      Document: [id number]

## 2017-08-17 NOTE — LETTER
8/17/2017    MIKE DEMPSEY  [facility]   [address]  [facility] [place] [address]    RE: Sethsai MIKEY Gutierrez       Dear Colleague,    I had the pleasure of seeing Charlene Gutierrez in the [facility].    REASON FOR VISIT:  Evaluation of persistent atrial fibrillation.      Mr. Gutierrez is a pleasant 71-year-old gentleman with a history of hypertension, hyperlipidemia and atrial fibrillation who is here for evaluation.      The patient failed rhythm control strategy with flecainide and cardioversion.  He underwent pulmonary vein isolation on 07/06.  Unfortunately, on 07/13, he had recurrence of atrial fibrillation.  He underwent another cardioversion which was successful; however, he had again recurrence of atrial fibrillation.  Finally, flecainide was discontinued and he was loaded with amiodarone.      At the moment, he is doing well.  He informs that immediately after the ablation, he did not feel himself.  He was very fatigued and also experienced some subjective fevers.  However, his symptoms improved and he is now back to normal.      He continues to be in atrial fibrillation.  He believes he has been in atrial fibrillation for the last week.  He denies any other symptoms such as chest pain, shortness of breath, chills, fever, lightheadedness, near-syncope or syncopal episode. He actually affirms that he can tolerate Afib better this time and heart rate seems to be better control on Amiodarone.     EKG done here today confirmed atrial fibrillation with controlled ventricular response, heart rate at 90 beats per minute.  He is currently taking a combination of amiodarone and metoprolol.     Outpatient Encounter Prescriptions as of 8/17/2017   Medication Sig Dispense Refill     [DISCONTINUED] amiodarone (PACERONE/CODARONE) 200 MG tablet Take 200 mg daily 30 tablet 0     [DISCONTINUED] metoprolol (TOPROL XL) 25 MG 24 hr tablet Take 1 tablet (25 mg) by mouth daily 30 tablet  3     apixaban ANTICOAGULANT (ELIQUIS) 5 MG tablet Take 1 tablet (5 mg) by mouth 2 times daily 180 tablet 1     [DISCONTINUED] omeprazole (PRILOSEC) 20 MG CR capsule Take 1 capsule (20 mg) by mouth daily 30 capsule 0     nitroglycerin (NITROSTAT) 0.4 MG sublingual tablet For chest pain place 1 tablet under the tongue every 5 minutes for 3 doses. If symptoms persist 5 minutes after 1st dose call 911. 25 tablet 0     Elastic Bandages & Supports (JOBST FOR MEN KNEE HIGH/LG) MISC 1 each daily Use it on the right leg as instructed 1 each 1     sildenafil (VIAGRA) 50 MG tablet Take 0.5-1 tablets (25-50 mg) by mouth daily as needed for erectile dysfunction Take 30 min to 4 hours before intercourse.  Never use with nitroglycerin, terazosin or doxazosin. 36 tablet 0     ORDER FOR DME Equipment being ordered: heel lift- MD 1 Device 0     Elastic Bandage MISC 1 each daily. Use it on the right leg as instructed. 1 each 1     STATIN NOT PRESCRIBED, INTENTIONAL, by Other route continuous prn.  0     [DISCONTINUED] FISH OIL one capsule daily       IRON 325 (65 FE) MG OR TABS 1 TABLET DAILY       No facility-administered encounter medications on file as of 8/17/2017.       ASSESSMENT AND PLAN:   1.  Recurrent persistent atrial fibrillation.  Failed therapy with flecainide.  He underwent ablation and had recurrence of atrial fibrillation.  He is still in the healing phase of the ablation.  I recommend continuing amiodarone and pursue another cardioversion.      After our discussion, he is not interested in pursuing cardioversion.  He is discouraged by the fact that he already had 2 cardioversions and atrial fibrillation recurred.  I explained that he is now taking a different medication.  After our discussion, he would like to think about it.  We will follow up with him over the phone to make the decision whether or not he should go to cardioversion.     If he decides not to have a cardioversion done, I will keep him on amiodarone  for another month as he is leaving the country.  When he gets back, he will follow up with me to possibly discontinue amiodarone and continue rate control.     Of note, he had recent labs including BMP, which revealed a creatinine of 1.3.  Otherwise, BMP was within normal limits.  He also had a CBC, which was within normal limits (WBC 5.5, hemoglobin 13, hematocrit 40.3, platelets 288).     2.  Embolic prevention.  CHADS-VASc score of 2.  Continue anticoagulation with Eliquis indefinitely.   3.  Immunotherapy.  I recommend checking LFTs and PFTs.  He already had a chest CT and he actually was found to have nodules and will have a repeat chest CT at some point.   4.  Hypertension:  Blood pressure is well controlled.     Again, thank you for allowing me to participate in the care of your patient.      Sincerely,    Sreekanth Aguayo MD     [facility]

## 2017-08-17 NOTE — MR AVS SNAPSHOT
After Visit Summary   8/17/2017    Charlene Gutierrez    MRN: [id number]           Patient Information     Date Of Birth          1945        Visit Information        Provider Department      8/17/2017 7:45 AM Sreekanth Aguayo MD [facility]        Today's Diagnoses     Persistent atrial fibrillation (H)    -  1       Follow-ups after your visit        Your next 10 appointments already scheduled     Aug 17, 2017  8:40 AM CDT   LAB with [facility]   [facility] ([facility])    [address] [place] [id number] [phone]           Patient must bring picture ID. Patient should be prepared to give a urine specimen  Please do not eat 10-12 hours before your appointment if you are coming in fasting for labs on lipids, cholesterol, or glucose (sugar). Pregnant women should follow their Care Team instructions. Water with medications is okay. Do not drink coffee or other fluids. If you have concerns about taking  your medications, please ask at office or if scheduling via [employer], send a message by clicking on Secure Messaging, Message Your Care Team.            Sep 20, 2017  8:15 AM CDT   [facility] EP RETURN with Sreekanth Aguayo MD   [facility] ([facility])    [address] [place] [id number] [phone]              Future tests that were ordered for you today     Open Future Orders        Priority Expected Expires Ordered    Hepatic panel Routine 8/24/2017 8/17/2018 8/17/2017    TSH Routine 8/24/2017 8/17/2018 8/17/2017    Pulmonary function test Routine 8/24/2017 8/17/2018 8/17/2017            Who to contact     If you have questions or need follow up information about today's clinic visit or your schedule please contact [facility] directly at [phone].  Normal or  "non-critical lab and imaging results will be communicated to you by MyChart, letter or phone within 4 business days after the clinic has received the results. If you do not hear from us within 7 days, please contact the clinic through [employer] or phone. If you have a critical or abnormal lab result, we will notify you by phone as soon as possible.  Submit refill requests through [employer] or call your pharmacy and they will forward the refill request to us. Please allow 3 business days for your refill to be completed.          Additional Information About Your Visit        [employer] Information     [employer] gives you secure access to your electronic health record. If you see a primary care provider, you can also send messages to your care team and make appointments. If you have questions, please call your primary care clinic.  If you do not have a primary care provider, please call [phone] and they will assist you.        Care EveryWhere ID     This is your Care EveryWhere ID. This could be used by other organizations to access your [facility] medical records  [id number]        Your Vitals Were     Pulse Height BMI (Body Mass Index)             72 1.651 m (5' 5\") 27.62 kg/m2          Blood Pressure from Last 3 Encounters:   08/17/17 124/78   07/13/17 124/80   07/07/17 127/79    Weight from Last 3 Encounters:   08/17/17 75.3 kg (166 lb)   07/13/17 77.6 kg (171 lb)   07/07/17 77.1 kg (170 lb)              We Performed the Following     EKG 12-lead complete w/read - Clinics (performed today)        Primary Care Provider Office Phone # Fax #    Ann Aristides [phone]       [facility] [address] [place] [address]        Equal Access to Services     GLENDY LUIS : Hadii mirian Dean, wamartellda luqadaha, qaybta kaalmada john, eze dumont. So [facility] [phone].    ATENCIÓN: Si habla español, tiene a dejesus disposición servicios gratuitos de " asistencia lingüística. Rebeca al [phone].    We comply with applicable federal civil rights laws and [place] laws. We do not discriminate on the basis of race, color, national origin, age, disability sex, sexual orientation or gender identity.            Thank you!     Thank you for choosing [facility]  for your care. Our goal is always to provide you with excellent care. Hearing back from our patients is one way we can continue to improve our services. Please take a few minutes to complete the written survey that you may receive in the mail after your visit with us. Thank you!             Your Updated Medication List - Protect others around you: Learn how to safely use, store and throw away your medicines at www.disposemymeds.org.          This list is accurate as of: 8/17/17  8:35 AM.  Always use your most recent med list.                   Brand Name Dispense Instructions for use Diagnosis    amiodarone 200 MG tablet    PACERONE/CODARONE    30 tablet    Take 200 mg daily    Persistent atrial fibrillation (H)       apixaban ANTICOAGULANT 5 MG tablet    ELIQUIS    180 tablet    Take 1 tablet (5 mg) by mouth 2 times daily    Atrial fibrillation, unspecified type (H)       * Elastic Bandage Misc     1 each    1 each daily. Use it on the right leg as instructed.    Varicose veins       * JOBST FOR MEN KNEE HIGH/LG Misc     1 each    1 each daily Use it on the right leg as instructed    Varicose veins       FISH OIL      one capsule daily        iron 325 (65 FE) MG tablet      1 TABLET DAILY        metoprolol 25 MG 24 hr tablet    TOPROL XL    30 tablet    Take 1 tablet (25 mg) by mouth daily    Persistent atrial fibrillation (H)       nitroGLYcerin 0.4 MG sublingual tablet    NITROSTAT    25 tablet    For chest pain place 1 tablet under the tongue every 5 minutes for 3 doses. If symptoms persist 5 minutes after 1st dose call 911.    Chest pressure       omeprazole 20 MG  CR capsule    priLOSEC    30 capsule    Take 1 capsule (20 mg) by mouth daily    Persistent atrial fibrillation (H)       order for DME     1 Device    Equipment being ordered: heel lift- MD    Achilles tendinosis, right       sildenafil 50 MG cap/tab    REVATIO/VIAGRA    36 tablet    Take 0.5-1 tablets (25-50 mg) by mouth daily as needed for erectile dysfunction Take 30 min to 4 hours before intercourse.  Never use with nitroglycerin, terazosin or doxazosin.    Erectile dysfunction       STATIN NOT PRESCRIBED (INTENTIONAL)      by Other route continuous prn.    Hypertension goal BP (blood pressure) < 140/90, Hyperlipidemia LDL goal <130       * Notice:  This list has 2 medication(s) that are the same as other medications prescribed for you. Read the directions carefully, and ask your doctor or other care provider to review them with you.

## 2017-08-17 NOTE — PROGRESS NOTES
"Electrophysiology/ Clinic Note         H&P and Plan:     [id number]  Sreekanth Aguayo MD    Physical Exam:  Vitals: /78  Pulse 72  Ht 1.651 m (5' 5\")  Wt 75.3 kg (166 lb)  BMI 27.62 kg/m2    Constitutional:  AAO x3.  Pt is in NAD.  HEAD: normocephalic.  SKIN: Skin normal color, texture and turgor with no lesions or eruptions.  Eyes: PERRL, EOMI.  ENT:  Supple, normal JVP. No lymphadenopathy or thyroid enlargement.  Chest:  CTAB.  Cardiac:  RRR, normal  S1 and S2.  No murmurs rubs or gallop.    Abdomen:  Normal BS.  Soft, non-tender and non-distended.  No rebound or guarding.    Extremities:  Pedious pulses palpable B/L.  No LE edema noticed.   Neurological: Strength and sensation grossly symmetric and intact throughout.       CURRENT MEDICATIONS:  Current Outpatient Prescriptions   Medication Sig Dispense Refill     amiodarone (PACERONE/CODARONE) 200 MG tablet Take 200 mg daily 30 tablet 0     metoprolol (TOPROL XL) 25 MG 24 hr tablet Take 1 tablet (25 mg) by mouth daily 30 tablet 3     apixaban ANTICOAGULANT (ELIQUIS) 5 MG tablet Take 1 tablet (5 mg) by mouth 2 times daily 180 tablet 1     omeprazole (PRILOSEC) 20 MG CR capsule Take 1 capsule (20 mg) by mouth daily 30 capsule 0     nitroglycerin (NITROSTAT) 0.4 MG sublingual tablet For chest pain place 1 tablet under the tongue every 5 minutes for 3 doses. If symptoms persist 5 minutes after 1st dose call 911. 25 tablet 0     Elastic Bandages & Supports (JOBST FOR MEN KNEE HIGH/LG) MISC 1 each daily Use it on the right leg as instructed 1 each 1     sildenafil (VIAGRA) 50 MG tablet Take 0.5-1 tablets (25-50 mg) by mouth daily as needed for erectile dysfunction Take 30 min to 4 hours before intercourse.  Never use with nitroglycerin, terazosin or doxazosin. 36 tablet 0     ORDER FOR DME Equipment being ordered: heel lift- MD 1 Device 0     Elastic Bandage MISC 1 each daily. Use it on the right leg as instructed. 1 each 1     STATIN NOT PRESCRIBED, INTENTIONAL, " by Other route continuous prn.  0     FISH OIL one capsule daily       IRON 325 (65 FE) MG OR TABS 1 TABLET DAILY         ALLERGIES   No Known Allergies    PAST MEDICAL HISTORY:  Past Medical History:   Diagnosis Date     Atrial fibrillation (H) 11/17/2013     CKD (chronic kidney disease) 2012     Gastric ulcer      HTN (hypertension), benign      Hyperlipidaemia        PAST SURGICAL HISTORY:  Past Surgical History:   Procedure Laterality Date     ANESTHESIA CARDIOVERSION N/A 6/5/2017    Procedure: ANESTHESIA CARDIOVERSION;  ANESTHESIA NEEDED FOR CARDIOVERSION IN CARE SUITES. (RHETT AT 0830);  Surgeon: GENERIC ANESTHESIA PROVIDER;  Location: [facility] OR     BRONCHOSCOPY       C INCISION OF PYLORIC MUSCLE       CARDIOVERSION  11/2013     COLONOSCOPY       EXTRACAPSULAR CATARACT EXTRATION WITH INTRAOCULAR LENS IMPLANT  2003    right eye       FAMILY HISTORY:  Family History   Problem Relation Age of Onset     CANCER Mother      CANCER Father      Colon Cancer Father      Prostate Cancer No family hx of        SOCIAL HISTORY:  Social History     Social History     Marital status:      Spouse name: N/A     Number of children: N/A     Years of education: N/A     Social History Main Topics     Smoking status: Never Smoker     Smokeless tobacco: Never Used     Alcohol use Yes      Comment: ocass     Drug use: No     Sexual activity: Yes     Partners: Female     Other Topics Concern     Parent/Sibling W/ Cabg, Mi Or Angioplasty Before 65f 55m? No      Service Yes     Blood Transfusions No     Caffeine Concern No     Occupational Exposure No     Hobby Hazards No     Sleep Concern No     Stress Concern No     Weight Concern No     Special Diet No     Back Care No     Exercise Yes     Bike Helmet No     Seat Belt Yes     Self-Exams No     Social History Narrative       Review of Systems:  Skin:        Eyes:       ENT:       Respiratory:       Cardiovascular:       Gastroenterology:      Genitourinary:        Musculoskeletal:       Neurologic:       Psychiatric:       Heme/Lymph/Imm:       Endocrine:           Recent Lab Results:  LIPID RESULTS:  Lab Results   Component Value Date    CHOL 188 03/13/2017    HDL 46 03/13/2017     03/13/2017    TRIG 112 03/13/2017    CHOLHDLRATIO 4.1 03/13/2017    CHOLHDLRATIO 4.2 05/28/2015       LIVER ENZYME RESULTS:  Lab Results   Component Value Date    AST 22 03/13/2017    ALT 42 03/13/2017       CBC RESULTS:  Lab Results   Component Value Date    WBC 5.4 07/06/2017    RBC 4.11 (L) 07/06/2017    HGB 12.9 (L) 07/06/2017    HCT 37.2 (L) 07/06/2017    MCV 91 07/06/2017    MCH 31.4 07/06/2017    MCHC 34.7 07/06/2017    RDW 12.4 07/06/2017     07/06/2017       BMP RESULTS:  Lab Results   Component Value Date     07/06/2017    POTASSIUM 4.3 07/06/2017    CHLORIDE 109 07/06/2017    CO2 22 07/06/2017    ANIONGAP 8 07/06/2017    GLC 95 07/06/2017    BUN 17 07/06/2017    CR 0.94 07/06/2017    GFRESTIMATED 80 07/06/2017    GFRESTBLACK >90   GFR Calc   07/06/2017    CHUCKY 8.6 07/06/2017        A1C RESULTS:  Lab Results   Component Value Date    A1C 5.5 03/13/2017       INR RESULTS:  Lab Results   Component Value Date    INR 1.13 07/06/2017    INR 1.18 (H) 06/05/2017         ECHOCARDIOGRAM  No results found for this or any previous visit (from the past 8760 hour(s)).      Orders Placed This Encounter   Procedures     Hepatic panel     TSH     EKG 12-lead complete w/read - Clinics (performed today)     Pulmonary function test     No orders of the defined types were placed in this encounter.    There are no discontinued medications.      Encounter Diagnosis   Name Primary?     Persistent atrial fibrillation (H) Yes         CC  No referring provider defined for this encounter.

## 2017-08-21 ENCOUNTER — TELEPHONE (OUTPATIENT)
Dept: CARDIOLOGY | Facility: CLINIC | Age: 72
End: 2017-08-21

## 2017-08-21 DIAGNOSIS — I48.19 PERSISTENT ATRIAL FIBRILLATION (H): ICD-10-CM

## 2017-08-21 RX ORDER — AMIODARONE HYDROCHLORIDE 200 MG/1
TABLET ORAL
Qty: 30 TABLET | Refills: 5 | Status: SHIPPED | OUTPATIENT
Start: 2017-08-21 | End: 2017-09-20

## 2017-08-21 NOTE — TELEPHONE ENCOUNTER
Pt called and stated that he would need a refill of his Amiodarone and that he would like to go ahead with the Cardioversion recommended by Dr Aguayo. Pt will be scheduled tomorrow morning for Cardioversion. Orders in placed. Escript for Amiodarone sent. Arvin

## 2017-08-22 ENCOUNTER — HOSPITAL ENCOUNTER (OUTPATIENT)
Dept: SURGERY | Facility: CLINIC | Age: 72
End: 2017-08-22
Attending: INTERNAL MEDICINE | Admitting: INTERNAL MEDICINE
Payer: COMMERCIAL

## 2017-08-22 ENCOUNTER — HOSPITAL ENCOUNTER (OUTPATIENT)
Facility: CLINIC | Age: 72
Discharge: HOME | End: 2017-08-22
Attending: INTERNAL MEDICINE | Admitting: INTERNAL MEDICINE
Payer: COMMERCIAL

## 2017-08-22 ENCOUNTER — ANESTHESIA (OUTPATIENT)
Dept: SURGERY | Facility: CLINIC | Age: 72
End: 2017-08-22
Payer: COMMERCIAL

## 2017-08-22 ENCOUNTER — ANESTHESIA EVENT (OUTPATIENT)
Dept: SURGERY | Facility: CLINIC | Age: 72
End: 2017-08-22
Payer: COMMERCIAL

## 2017-08-22 VITALS
TEMPERATURE: 98 F | RESPIRATION RATE: 22 BRPM | DIASTOLIC BLOOD PRESSURE: 92 MMHG | HEART RATE: 66 BPM | OXYGEN SATURATION: 98 % | SYSTOLIC BLOOD PRESSURE: 123 MMHG

## 2017-08-22 DIAGNOSIS — I48.19 PERSISTENT ATRIAL FIBRILLATION (H): ICD-10-CM

## 2017-08-22 LAB
MAGNESIUM SERPL-MCNC: 2.1 MG/DL (ref 1.6–2.3)
POTASSIUM SERPL-SCNC: 4.3 MMOL/L (ref 3.4–5.3)

## 2017-08-22 PROCEDURE — 37000008 ZZH ANESTHESIA TECHNICAL FEE, 1ST 30 MIN

## 2017-08-22 PROCEDURE — 92960 CARDIOVERSION ELECTRIC EXT: CPT

## 2017-08-22 PROCEDURE — 40000010 ZZH STATISTIC ANES STAT CODE-CRNA PER MINUTE

## 2017-08-22 PROCEDURE — 84132 ASSAY OF SERUM POTASSIUM: CPT | Performed by: INTERNAL MEDICINE

## 2017-08-22 PROCEDURE — 83735 ASSAY OF MAGNESIUM: CPT | Performed by: INTERNAL MEDICINE

## 2017-08-22 PROCEDURE — 25000128 H RX IP 250 OP 636: Performed by: NURSE ANESTHETIST, CERTIFIED REGISTERED

## 2017-08-22 PROCEDURE — 25000125 ZZHC RX 250: Performed by: NURSE ANESTHETIST, CERTIFIED REGISTERED

## 2017-08-22 PROCEDURE — 36415 COLL VENOUS BLD VENIPUNCTURE: CPT | Performed by: INTERNAL MEDICINE

## 2017-08-22 PROCEDURE — 92960 CARDIOVERSION ELECTRIC EXT: CPT | Performed by: INTERNAL MEDICINE

## 2017-08-22 RX ORDER — HYDROMORPHONE HYDROCHLORIDE 1 MG/ML
.3-.5 INJECTION, SOLUTION INTRAMUSCULAR; INTRAVENOUS; SUBCUTANEOUS EVERY 10 MIN PRN
Status: DISCONTINUED | OUTPATIENT
Start: 2017-08-22 | End: 2017-08-23 | Stop reason: HOSPADM

## 2017-08-22 RX ORDER — ONDANSETRON 2 MG/ML
4 INJECTION INTRAMUSCULAR; INTRAVENOUS EVERY 30 MIN PRN
Status: DISCONTINUED | OUTPATIENT
Start: 2017-08-22 | End: 2017-08-23 | Stop reason: HOSPADM

## 2017-08-22 RX ORDER — HYDRALAZINE HYDROCHLORIDE 20 MG/ML
2.5-5 INJECTION INTRAMUSCULAR; INTRAVENOUS EVERY 10 MIN PRN
Status: DISCONTINUED | OUTPATIENT
Start: 2017-08-22 | End: 2017-08-23 | Stop reason: HOSPADM

## 2017-08-22 RX ORDER — LIDOCAINE HYDROCHLORIDE 20 MG/ML
INJECTION, SOLUTION INFILTRATION; PERINEURAL PRN
Status: DISCONTINUED | OUTPATIENT
Start: 2017-08-22 | End: 2017-08-22

## 2017-08-22 RX ORDER — LABETALOL HYDROCHLORIDE 5 MG/ML
10 INJECTION, SOLUTION INTRAVENOUS
Status: DISCONTINUED | OUTPATIENT
Start: 2017-08-22 | End: 2017-08-23 | Stop reason: HOSPADM

## 2017-08-22 RX ORDER — PROPOFOL 10 MG/ML
INJECTION, EMULSION INTRAVENOUS PRN
Status: DISCONTINUED | OUTPATIENT
Start: 2017-08-22 | End: 2017-08-22

## 2017-08-22 RX ORDER — ALBUTEROL SULFATE 0.83 MG/ML
2.5 SOLUTION RESPIRATORY (INHALATION) EVERY 4 HOURS PRN
Status: DISCONTINUED | OUTPATIENT
Start: 2017-08-22 | End: 2017-08-23 | Stop reason: HOSPADM

## 2017-08-22 RX ORDER — NALOXONE HYDROCHLORIDE 0.4 MG/ML
.1-.4 INJECTION, SOLUTION INTRAMUSCULAR; INTRAVENOUS; SUBCUTANEOUS
Status: DISCONTINUED | OUTPATIENT
Start: 2017-08-22 | End: 2017-08-23 | Stop reason: HOSPADM

## 2017-08-22 RX ORDER — ONDANSETRON 4 MG/1
4 TABLET, ORALLY DISINTEGRATING ORAL EVERY 30 MIN PRN
Status: DISCONTINUED | OUTPATIENT
Start: 2017-08-22 | End: 2017-08-23 | Stop reason: HOSPADM

## 2017-08-22 RX ORDER — MEPERIDINE HYDROCHLORIDE 25 MG/ML
12.5 INJECTION INTRAMUSCULAR; INTRAVENOUS; SUBCUTANEOUS
Status: DISCONTINUED | OUTPATIENT
Start: 2017-08-22 | End: 2017-08-23 | Stop reason: HOSPADM

## 2017-08-22 RX ORDER — SODIUM CHLORIDE, SODIUM LACTATE, POTASSIUM CHLORIDE, CALCIUM CHLORIDE 600; 310; 30; 20 MG/100ML; MG/100ML; MG/100ML; MG/100ML
INJECTION, SOLUTION INTRAVENOUS CONTINUOUS
Status: DISCONTINUED | OUTPATIENT
Start: 2017-08-22 | End: 2017-08-23 | Stop reason: HOSPADM

## 2017-08-22 RX ORDER — FENTANYL CITRATE 50 UG/ML
25-50 INJECTION, SOLUTION INTRAMUSCULAR; INTRAVENOUS
Status: DISCONTINUED | OUTPATIENT
Start: 2017-08-22 | End: 2017-08-23 | Stop reason: HOSPADM

## 2017-08-22 RX ADMIN — LIDOCAINE HYDROCHLORIDE 40 MG: 20 INJECTION, SOLUTION INFILTRATION; PERINEURAL at 10:51

## 2017-08-22 RX ADMIN — PROPOFOL 80 MG: 10 INJECTION, EMULSION INTRAVENOUS at 10:51

## 2017-08-22 ASSESSMENT — COPD QUESTIONNAIRES: COPD: 0

## 2017-08-22 ASSESSMENT — ENCOUNTER SYMPTOMS
DYSRHYTHMIAS: 1
SEIZURES: 0

## 2017-08-22 ASSESSMENT — LIFESTYLE VARIABLES: TOBACCO_USE: 1

## 2017-08-22 NOTE — ANESTHESIA POSTPROCEDURE EVALUATION
Patient: Charlene Gutierrez    * No procedures listed *    Diagnosis: Afib  Diagnosis Additional Information: No value filed.    Anesthesia Type:  MAC    Note:  Anesthesia Post Evaluation    Patient location during evaluation: PACU  Patient participation: Able to fully participate in evaluation  Level of consciousness: awake and alert  Pain management: adequate  Airway patency: patent  Cardiovascular status: acceptable and hemodynamically stable  Respiratory status: acceptable and room air  Hydration status: acceptable  PONV: none     Anesthetic complications: None          Last vitals:  Vitals:    08/22/17 0909   BP: (!) (P) 118/105   Pulse: (P) 103   Resp: (P) 12   Temp: (P) 36.8  C (98.3  F)   SpO2: (P) 96%         Electronically Signed By: Lalitha Hanley MD  August 22, 2017  10:59 AM

## 2017-08-22 NOTE — ANESTHESIA CARE TRANSFER NOTE
Patient: Charlene Gutierrez    * No procedures listed *    Diagnosis: * No pre-op diagnosis entered *  Diagnosis Additional Information: No value filed.    Anesthesia Type:   No value filed.     Note:  Airway :Nasal Cannula  Patient transferred to:PACU  Comments: At end of procedure, spontaneous respirations, patient alert to voice, able to follow commands. Oxygen via nasal cannula at 3 liters per minute to PACU. Oxygen tubing connected to wall O2 in PACU, SpO2, NiBP, and EKG monitors and alarms on and functioning, report on patient's clinical status given to PACU RN, RN questions answered.      Vitals: (Last set prior to Anesthesia Care Transfer)    CRNA VITALS  8/22/2017 1024 - 8/22/2017 1057      8/22/2017             Pulse: 69    SpO2: 100 %    Resp Rate (observed): 22    Resp Rate (set): 10                Electronically Signed By: JOSÉ MIGUEL Briscoe CRNA  August 22, 2017  10:57 AM

## 2017-08-22 NOTE — OP NOTE
DATE OF PROCEDURE:  2017      PROCEDURE:  Electrical cardioversion of atrial fibrillation.      INDICATIONS:  Symptomatic atrial fibrillation.      PROCEDURE NOTE:  Charlene Gutierrez was seen in the post-anesthesia care unit at [facility] where the risks and benefits of this procedure were discussed in detail with the patient and his wife.  The patient has been on Eliquis consistently for the last month, at least, and denies missing doses.  I have discussed the fact that Eliquis helps to prevent strokes with this procedure, but the risk of stroke is never zero.  The patient understands and gives written and verbal consent to proceed.      After the patient was properly sedated, single synchronized biphasic countershock was delivered with the pads in the anterior and posterior positions with 120 joules, converting the patient to sinus rhythm.      CONCLUSION:  Successful cardioversion of atrial fibrillation to sinus rhythm.  No apparent complications.         WALKER PATHAK MD, [facility]             D: 2017 10:57   T: 2017 12:13   MT: [id number]      Name:     CHARLENE GUTIERREZ   MRN:      [id number]        Account:        [id number]   :      1945           Procedure Date: 2017      Document: [id number]       cc: Sreekanth Aguayo MD

## 2017-08-22 NOTE — ANESTHESIA PREPROCEDURE EVALUATION
Procedure: * No procedures listed *  Preop diagnosis: * No pre-op diagnosis entered *    No Known Allergies  Past Medical History:   Diagnosis Date     Atrial fibrillation (H) 11/17/2013     CKD (chronic kidney disease) 2012     Gastric ulcer      HTN (hypertension), benign      Hyperlipidaemia      Past Surgical History:   Procedure Laterality Date     ANESTHESIA CARDIOVERSION N/A 6/5/2017    Procedure: ANESTHESIA CARDIOVERSION;  ANESTHESIA NEEDED FOR CARDIOVERSION IN CARE SUITES. (RHETT AT 0830);  Surgeon: GENERIC ANESTHESIA PROVIDER;  Location: [facility]     BRONCHOSCOPY       C INCISION OF PYLORIC MUSCLE       CARDIOVERSION  11/2013     COLONOSCOPY       EXTRACAPSULAR CATARACT EXTRATION WITH INTRAOCULAR LENS IMPLANT  2003    right eye     Prior to Admission medications    Medication Sig Start Date End Date Taking? Authorizing Provider   amiodarone (PACERONE/CODARONE) 200 MG tablet Take 200 mg daily 8/21/17  Yes Sreekanth Aguayo MD   metoprolol (TOPROL XL) 25 MG 24 hr tablet Take 1 tablet (25 mg) by mouth daily 8/1/17  Yes Sreekanth Aguayo MD   apixaban ANTICOAGULANT (ELIQUIS) 5 MG tablet Take 1 tablet (5 mg) by mouth 2 times daily 7/7/17  Yes Amena Moore APRN CNP   omeprazole (PRILOSEC) 20 MG CR capsule Take 1 capsule (20 mg) by mouth daily 7/7/17  Yes Amena Moore APRN CNP   sildenafil (VIAGRA) 50 MG tablet Take 0.5-1 tablets (25-50 mg) by mouth daily as needed for erectile dysfunction Take 30 min to 4 hours before intercourse.  Never use with nitroglycerin, terazosin or doxazosin. 11/20/14  Yes Michelle Morejon MD   FISH OIL one capsule daily   Yes Reported, Patient   IRON 325 (65 FE) MG OR TABS 1 TABLET DAILY   Yes Reported, Patient   nitroglycerin (NITROSTAT) 0.4 MG sublingual tablet For chest pain place 1 tablet under the tongue every 5 minutes for 3 doses. If symptoms persist 5 minutes after 1st dose call 911. 6/9/17   Jyoti Luna PA-C   Elastic Bandages & Supports (JOBST FOR  MEN KNEE HIGH/LG) MISC 1 each daily Use it on the right leg as instructed 5/28/15   Fouzia Kent MD   ORDER FOR DME Equipment being ordered: heel lift- MD 11/17/14   Dilan Thomas MD   Elastic Bandage MISC 1 each daily. Use it on the right leg as instructed. 4/4/13   Michelle Morejon MD   STATIN NOT PRESCRIBED, INTENTIONAL, by Other route continuous prn. 2/25/11   Michelle Morejon MD     No current [facility]-ordered outpatient prescriptions on file.     No current [facility]-ordered facility-administered medications on file.      Wt Readings from Last 1 Encounters:   08/17/17 75.3 kg (166 lb)     Temp Readings from Last 1 Encounters:   08/22/17 (P) 36.8  C (98.3  F) ((P) Temporal)     BP Readings from Last 6 Encounters:   08/22/17 (!) (P) 118/105   08/17/17 124/78   07/13/17 124/80   07/07/17 127/79   06/26/17 108/72   06/20/17 115/83     Pulse Readings from Last 4 Encounters:   08/22/17 (P) 103   08/17/17 72   07/13/17 66   07/06/17 83     Resp Readings from Last 1 Encounters:   08/22/17 (P) 12     SpO2 Readings from Last 1 Encounters:   08/22/17 (P) 96%     Recent Labs   Lab Test  08/22/17   0922  07/06/17   1140  03/13/17   NA   --   139   --   140   POTASSIUM  4.3  4.3   < >  4.4   CHLORIDE   --   109   --   108   CO2   --   22   --   25   ANIONGAP   --   8   --   7.0   GLC   --   95   --   101   BUN   --   17   --   23   CR   --   0.94   --   1.12   CHUCKY   --   8.6   --   8.7    < > = values in this interval not displayed.     Recent Labs   Lab Test  08/17/17   0835 03/13/17   AST  25  22   ALT  45  42     Recent Labs   Lab Test  07/06/17   1140 03/13/17   WBC  5.4  7.1   HGB  12.9*  13.6   PLT  249  249     Recent Labs   Lab Test  07/06/17   1203  06/05/17   0740   INR  1.13  1.18*      Recent Labs   Lab Test  11/17/13   0445   TROPI  <0.012     RECENT LABS:   ECG:   ECHO:   Procedure: * No procedures listed *  Preop diagnosis: * No pre-op diagnosis entered *    No Known Allergies  Past Medical  History:   Diagnosis Date     Atrial fibrillation (H) 11/17/2013     CKD (chronic kidney disease) 2012     Gastric ulcer      HTN (hypertension), benign      Hyperlipidaemia      Past Surgical History:   Procedure Laterality Date     ANESTHESIA CARDIOVERSION N/A 6/5/2017    Procedure: ANESTHESIA CARDIOVERSION;  ANESTHESIA NEEDED FOR CARDIOVERSION IN CARE SUITES. (RHETT AT 0830);  Surgeon: GENERIC ANESTHESIA PROVIDER;  Location: [facility]     BRONCHOSCOPY       C INCISION OF PYLORIC MUSCLE       CARDIOVERSION  11/2013     COLONOSCOPY       EXTRACAPSULAR CATARACT EXTRATION WITH INTRAOCULAR LENS IMPLANT  2003    right eye     Prior to Admission medications    Medication Sig Start Date End Date Taking? Authorizing Provider   amiodarone (PACERONE/CODARONE) 200 MG tablet Take 200 mg daily 8/21/17  Yes Sreekanth Aguayo MD   metoprolol (TOPROL XL) 25 MG 24 hr tablet Take 1 tablet (25 mg) by mouth daily 8/1/17  Yes Sreekanth Aguayo MD   apixaban ANTICOAGULANT (ELIQUIS) 5 MG tablet Take 1 tablet (5 mg) by mouth 2 times daily 7/7/17  Yes Amena Moore APRN CNP   omeprazole (PRILOSEC) 20 MG CR capsule Take 1 capsule (20 mg) by mouth daily 7/7/17  Yes Amena Moore APRN CNP   sildenafil (VIAGRA) 50 MG tablet Take 0.5-1 tablets (25-50 mg) by mouth daily as needed for erectile dysfunction Take 30 min to 4 hours before intercourse.  Never use with nitroglycerin, terazosin or doxazosin. 11/20/14  Yes Michelle Morejon MD   FISH OIL one capsule daily   Yes Reported, Patient   IRON 325 (65 FE) MG OR TABS 1 TABLET DAILY   Yes Reported, Patient   nitroglycerin (NITROSTAT) 0.4 MG sublingual tablet For chest pain place 1 tablet under the tongue every 5 minutes for 3 doses. If symptoms persist 5 minutes after 1st dose call 911. 6/9/17   Jyoti Luna PA-C   Elastic Bandages & Supports (JOBST FOR MEN KNEE HIGH/LG) MISC 1 each daily Use it on the right leg as instructed 5/28/15   Fouzia Kent MD   ORDER FOR DME  Equipment being ordered: heel lift- MD 11/17/14   Dilan Thomas MD   Elastic Bandage MISC 1 each daily. Use it on the right leg as instructed. 4/4/13   Michelle Morejon MD   STATIN NOT PRESCRIBED, INTENTIONAL, by Other route continuous prn. 2/25/11   Michelle Morejon MD     No current Epic-ordered outpatient prescriptions on file.     No current Epic-ordered facility-administered medications on file.      Wt Readings from Last 1 Encounters:   08/17/17 75.3 kg (166 lb)     Temp Readings from Last 1 Encounters:   08/22/17 (P) 36.8  C (98.3  F) ((P) Temporal)     BP Readings from Last 6 Encounters:   08/22/17 (!) (P) 118/105   08/17/17 124/78   07/13/17 124/80   07/07/17 127/79   06/26/17 108/72   06/20/17 115/83     Pulse Readings from Last 4 Encounters:   08/22/17 (P) 103   08/17/17 72   07/13/17 66   07/06/17 83     Resp Readings from Last 1 Encounters:   08/22/17 (P) 12     SpO2 Readings from Last 1 Encounters:   08/22/17 (P) 96%     Recent Labs   Lab Test  08/22/17   0922  07/06/17   1140  03/13/17   NA   --   139   --   140   POTASSIUM  4.3  4.3   < >  4.4   CHLORIDE   --   109   --   108   CO2   --   22   --   25   ANIONGAP   --   8   --   7.0   GLC   --   95   --   101   BUN   --   17   --   23   CR   --   0.94   --   1.12   CHUCKY   --   8.6   --   8.7    < > = values in this interval not displayed.     Recent Labs   Lab Test  08/17/17   0835 03/13/17   AST  25  22   ALT  45  42     Recent Labs   Lab Test  07/06/17   1140 03/13/17   WBC  5.4  7.1   HGB  12.9*  13.6   PLT  249  249     Recent Labs   Lab Test  07/06/17   1203  06/05/17   0740   INR  1.13  1.18*      Recent Labs   Lab Test  11/17/13   0445   TROPI  <0.012     RECENT LABS:   ECG:   ECHO:   Procedure  Complete RHETT Adult. RHETT Probe #Irving was used during the procedure.  _____________________________________________________________________________  __        Interpretation Summary     The visual ejection fraction is estimated at  55-60%.  The right ventricle is normal in size and function.  The left atrium is severely dilated.  No thrombus is detected in the left atrial appendage.  _____________________________________________________________________________  __        RHETT  Versed (2mg) was given intravenously. Fentanyl (50mcg) was given  intravenously. Total sedation time: 20 minutes of continuous bedside 1:1  monitoring. Consent to the procedure was obtained prior to sedation. Prior to  the exam, the oral cavity was checked and no overcrowding was noted. The  transesophageal probe was passed without difficulty. There were no  complications associated with this procedure.     Left Ventricle  The visual ejection fraction is estimated at 55-60%. No regional wall motion  abnormalities noted.     Right Ventricle  The right ventricle is normal in size and function.     Atria  The left atrium is severely dilated. No thrombus is detected in the left  atrial appendage. The right atrium is moderately dilated. There is no atrial  shunt seen.        Mitral Valve  The mitral valve is normal in structure and function. There is mild (1+)  mitral regurgitation.     Tricuspid Valve  The tricuspid valve is normal in structure and function. There is mild (1+)  tricuspid regurgitation.     Aortic Valve  There is mild trileaflet aortic sclerosis. No hemodynamically significant  valvular aortic stenosis.     Pulmonic Valve  The pulmonic valve is not well seen, but is grossly normal. There is no  pulmonic valvular regurgitation.     Vessels  The aortic root is normal size. Normal size ascending aorta. Mild  atherosclerotic plaque(s) in the descending aorta. Normal pulmonary venous  drainage.        Pericardial/Pleural  There is no pericardial effusion.     Rhythm  The rhythm was atrial fibrillation.    CXR:      CXR:        Anesthesia Evaluation     .             ROS/MED HX    ENT/Pulmonary:     (+)tobacco use, Past use , . .   (-) asthma, COPD and sleep apnea    Neurologic:      (-) seizures, CVA and migraines   Cardiovascular:     (+) Dyslipidemia, hypertension----. Taking blood thinners : . . . :. dysrhythmias a-fib, .      (-) CAD, VYAS and valvular problems/murmurs   METS/Exercise Tolerance:  >4 METS   Hematologic:        (-) history of blood clots, anemia and other hematologic disorder   Musculoskeletal:   (+) arthritis, , , -       GI/Hepatic:     (+) GERD      (-) liver disease   Renal/Genitourinary:     (+) chronic renal disease,       Endo:      (-) Type I DM, Type II DM and thyroid disease   Psychiatric:         Infectious Disease:        (-) Recent Fever   Malignancy:         Other:                     Physical Exam  Normal systems: cardiovascular, pulmonary and dental    Airway   Mallampati: III  TM distance: >3 FB  Neck ROM: full    Dental   (+) upper dentures    Cardiovascular   Rhythm and rate: regular and normal  (-) no murmur    Pulmonary    breath sounds clear to auscultation                    Anesthesia Plan      History & Physical Review      ASA Status:  3 .    NPO Status:  > 8 hours    Plan for MAC Reason for MAC:  Deep or markedly invasive procedure (G8)    Propofol bolus      Postoperative Care      Consents  Anesthetic plan, risks, benefits and alternatives discussed with:  Patient..                          .

## 2017-09-20 ENCOUNTER — OFFICE VISIT (OUTPATIENT)
Dept: CARDIOLOGY | Facility: CLINIC | Age: 72
End: 2017-09-20
Payer: COMMERCIAL

## 2017-09-20 VITALS
WEIGHT: 167.9 LBS | HEART RATE: 58 BPM | HEIGHT: 65 IN | SYSTOLIC BLOOD PRESSURE: 128 MMHG | DIASTOLIC BLOOD PRESSURE: 80 MMHG | BODY MASS INDEX: 27.97 KG/M2

## 2017-09-20 DIAGNOSIS — I48.19 PERSISTENT ATRIAL FIBRILLATION (H): Primary | ICD-10-CM

## 2017-09-20 PROCEDURE — 99213 OFFICE O/P EST LOW 20 MIN: CPT | Mod: 25 | Performed by: INTERNAL MEDICINE

## 2017-09-20 PROCEDURE — 93000 ELECTROCARDIOGRAM COMPLETE: CPT | Performed by: INTERNAL MEDICINE

## 2017-09-20 RX ORDER — AMIODARONE HYDROCHLORIDE 200 MG/1
TABLET ORAL
Qty: 30 TABLET | Refills: 5 | Status: SHIPPED | OUTPATIENT
Start: 2017-09-20 | End: 2017-11-27

## 2017-09-20 RX ORDER — METOPROLOL SUCCINATE 25 MG/1
25 TABLET, EXTENDED RELEASE ORAL DAILY
Qty: 30 TABLET | Refills: 3 | Status: SHIPPED | OUTPATIENT
Start: 2017-09-20 | End: 2017-11-27

## 2017-09-20 NOTE — PROGRESS NOTES
REASON FOR VISIT:  Evaluation of persistent atrial fibrillation.      HISTORY OF PRESENT ILLNESS:  Mr. Gutierrez is a pleasant 72-year-old gentleman with history of hypertension, hyperlipidemia, recurrent persistent AFib, who is here for evaluation.      The patient failed rate control strategy with flecainide.  He underwent pulmonary vein isolation .  Post-procedure, he had recurrence of AFib and underwent another successful cardioversion.  Flecainide was discontinued and he was loaded with amiodarone.      I last saw him when in August he was having recurrence of AFib.  At the time, he was very discouraged.  I recommend him to continue amiodarone and pursue another cardioversion, which he agreed.  The last cardioversion was successful.  He informs that since last cardioversion he had not had any recurrence of AFib.      He denies any symptoms such as chest pain, shortness of breath, lightheadedness, near-syncope or syncopal episode.  EKG done here today showed normal sinus rhythm with QTc measuring 442 milliseconds.      ASSESSMENT AND PLAN:   1.  Recurrent persistent AFib.  His is still in the healing phase after the ablation.  We will continue current therapy with amiodarone and metoprolol.  He is going to come back in 2 months to re-evaluate use of anti-arrhythmic therapy.   2.  Embolic prevention.  CHADS-VASc score 2.  Continue anticoagulation with Eliquis definitely.    3.  Hypertension.  Blood pressure well controlled.         MERE FARFAN MD             D: 2017 08:33   T: 2017 12:02   MT: DUSTIN      Name:     ISIAH GUTIERREZ   MRN:      [id number]        Account:      [id number]   :      1945           Service Date: 2017      Document: [id number]

## 2017-09-20 NOTE — PROGRESS NOTES
"[id number]  Electrophysiology/ Clinic Note         H&P and Plan:       Sreekanth Aguayo MD    Physical Exam:  Vitals: /80  Pulse 58  Ht 1.651 m (5' 5\")  Wt 76.2 kg (167 lb 14.4 oz)  BMI 27.94 kg/m2    Constitutional:  AAO x3.  Pt is in NAD.  HEAD: normocephalic.  SKIN: Skin normal color, texture and turgor with no lesions or eruptions.  Eyes: PERRL, EOMI.  ENT:  Supple, normal JVP. No lymphadenopathy or thyroid enlargement.  Chest:  CTAB.  Cardiac:  RRR, normal  S1 and S2.  No murmurs rubs or gallop.    Abdomen:  Normal BS.  Soft, non-tender and non-distended.  No rebound or guarding.    Extremities:  Pedious pulses palpable B/L.  No LE edema noticed.   Neurological: Strength and sensation grossly symmetric and intact throughout.       CURRENT MEDICATIONS:  Current Outpatient Prescriptions   Medication Sig Dispense Refill     amiodarone (PACERONE/CODARONE) 200 MG tablet Take 200 mg daily 30 tablet 5     metoprolol (TOPROL XL) 25 MG 24 hr tablet Take 1 tablet (25 mg) by mouth daily 30 tablet 3     apixaban ANTICOAGULANT (ELIQUIS) 5 MG tablet Take 1 tablet (5 mg) by mouth 2 times daily 180 tablet 1     nitroglycerin (NITROSTAT) 0.4 MG sublingual tablet For chest pain place 1 tablet under the tongue every 5 minutes for 3 doses. If symptoms persist 5 minutes after 1st dose call 911. 25 tablet 0     sildenafil (VIAGRA) 50 MG tablet Take 0.5-1 tablets (25-50 mg) by mouth daily as needed for erectile dysfunction Take 30 min to 4 hours before intercourse.  Never use with nitroglycerin, terazosin or doxazosin. 36 tablet 0     IRON 325 (65 FE) MG OR TABS 1 TABLET DAILY       [DISCONTINUED] amiodarone (PACERONE/CODARONE) 200 MG tablet Take 200 mg daily 30 tablet 5     [DISCONTINUED] metoprolol (TOPROL XL) 25 MG 24 hr tablet Take 1 tablet (25 mg) by mouth daily 30 tablet 3     Elastic Bandages & Supports (JOBST FOR MEN KNEE HIGH/LG) MISC 1 each daily Use it on the right leg as instructed 1 each 1     ORDER FOR DME Equipment " being ordered: heel lift- MD 1 Device 0     Elastic Bandage MISC 1 each daily. Use it on the right leg as instructed. 1 each 1     STATIN NOT PRESCRIBED, INTENTIONAL, by Other route continuous prn.  0       ALLERGIES   No Known Allergies    PAST MEDICAL HISTORY:  Past Medical History:   Diagnosis Date     CKD (chronic kidney disease) 2012     Gastric ulcer      HTN (hypertension), benign      Hyperlipidaemia      Paroxysmal atrial fibrillation (H) 11/17/2013       PAST SURGICAL HISTORY:  Past Surgical History:   Procedure Laterality Date     ANESTHESIA CARDIOVERSION N/A 6/5/2017    Procedure: ANESTHESIA CARDIOVERSION;  ANESTHESIA NEEDED FOR CARDIOVERSION IN CARE SUITES. (RHETT AT 0830);  Surgeon: GENERIC ANESTHESIA PROVIDER;  Location: [facility] OR     BRONCHOSCOPY       C INCISION OF PYLORIC MUSCLE       CARDIOVERSION  11/2013, 8/22/17     COLONOSCOPY       EXTRACAPSULAR CATARACT EXTRATION WITH INTRAOCULAR LENS IMPLANT  2003    right eye     H ABLATION FOCAL AFIB  07/06/2017       FAMILY HISTORY:  Family History   Problem Relation Age of Onset     CANCER Mother      CANCER Father      Colon Cancer Father      Prostate Cancer No family hx of        SOCIAL HISTORY:  Social History     Social History     Marital status:      Spouse name: N/A     Number of children: N/A     Years of education: N/A     Social History Main Topics     Smoking status: Former Smoker     Years: 38.00     Types: Cigarettes     Quit date: 8/1/2012     Smokeless tobacco: Never Used     Alcohol use Yes      Comment: ocass     Drug use: No     Sexual activity: Yes     Partners: Female     Other Topics Concern     Parent/Sibling W/ Cabg, Mi Or Angioplasty Before 65f 55m? No      Service Yes     Blood Transfusions No     Caffeine Concern No     Occupational Exposure No     Hobby Hazards No     Sleep Concern No     Stress Concern No     Weight Concern No     Special Diet No     Back Care No     Exercise Yes     Bike Helmet No     Seat Belt Yes      Self-Exams No     Social History Narrative       Review of Systems:  Skin:  Negative     Eyes:  Positive for glasses  ENT:  Negative    Respiratory:  Negative    Cardiovascular:  Negative    Gastroenterology: Negative    Genitourinary:  Negative    Musculoskeletal:  Negative    Neurologic:  Negative    Psychiatric:  Negative    Heme/Lymph/Imm:  Negative    Endocrine:  Negative        Recent Lab Results:  LIPID RESULTS:  Lab Results   Component Value Date    CHOL 188 03/13/2017    HDL 46 03/13/2017     03/13/2017    TRIG 112 03/13/2017    CHOLHDLRATIO 4.1 03/13/2017    CHOLHDLRATIO 4.2 05/28/2015       LIVER ENZYME RESULTS:  Lab Results   Component Value Date    AST 25 08/17/2017    ALT 45 08/17/2017       CBC RESULTS:  Lab Results   Component Value Date    WBC 5.4 07/06/2017    RBC 4.11 (L) 07/06/2017    HGB 12.9 (L) 07/06/2017    HCT 37.2 (L) 07/06/2017    MCV 91 07/06/2017    MCH 31.4 07/06/2017    MCHC 34.7 07/06/2017    RDW 12.4 07/06/2017     07/06/2017       BMP RESULTS:  Lab Results   Component Value Date     07/06/2017    POTASSIUM 4.3 08/22/2017    CHLORIDE 109 07/06/2017    CO2 22 07/06/2017    ANIONGAP 8 07/06/2017    GLC 95 07/06/2017    BUN 17 07/06/2017    CR 0.94 07/06/2017    GFRESTIMATED 80 07/06/2017    GFRESTBLACK >90   GFR Calc   07/06/2017    CHUCKY 8.6 07/06/2017        A1C RESULTS:  Lab Results   Component Value Date    A1C 5.5 03/13/2017       INR RESULTS:  Lab Results   Component Value Date    INR 1.13 07/06/2017    INR 1.18 (H) 06/05/2017         ECHOCARDIOGRAM  No results found for this or any previous visit (from the past 8760 hour(s)).      Orders Placed This Encounter   Procedures     Follow-Up with Electrophysiologist     Orders Placed This Encounter   Medications     amiodarone (PACERONE/CODARONE) 200 MG tablet     Sig: Take 200 mg daily     Dispense:  30 tablet     Refill:  5     metoprolol (TOPROL XL) 25 MG 24 hr tablet     Sig: Take 1 tablet (25  mg) by mouth daily     Dispense:  30 tablet     Refill:  3     Medications Discontinued During This Encounter   Medication Reason     FISH OIL Stopped by Patient     omeprazole (PRILOSEC) 20 MG CR capsule Stopped by Patient     amiodarone (PACERONE/CODARONE) 200 MG tablet Reorder     metoprolol (TOPROL XL) 25 MG 24 hr tablet Reorder         Encounter Diagnosis   Name Primary?     Persistent atrial fibrillation (H) Yes         CC  Sreekanth Aguayo MD  [address]  [place] [id number]

## 2017-09-20 NOTE — MR AVS SNAPSHOT
After Visit Summary   9/20/2017    Charlene Gutierrez    MRN: [id number]           Patient Information     Date Of Birth          1945        Visit Information        Provider Department      9/20/2017 8:15 AM Sreekanth Aguayo MD [facility]        Today's Diagnoses     Persistent atrial fibrillation (H)    -  1       Follow-ups after your visit        Additional Services     Follow-Up with Electrophysiologist                 Your next 10 appointments already scheduled     Nov 27, 2017  7:45 AM CST   [facility] EP RETURN with Sreekanth Aguayo MD   [facility] ([facility])    [address] [place] [id number] [phone]              Future tests that were ordered for you today     Open Future Orders        Priority Expected Expires Ordered    Follow-Up with Electrophysiologist Routine 11/22/2017 9/20/2018 9/20/2017            Who to contact     If you have questions or need follow up information about today's clinic visit or your schedule please contact [facility] directly at [phone].  Normal or non-critical lab and imaging results will be communicated to you by Ternhart, letter or phone within 4 business days after the clinic has received the results. If you do not hear from us within 7 days, please contact the clinic through Ternhart or phone. If you have a critical or abnormal lab result, we will notify you by phone as soon as possible.  Submit refill requests through [employer] or call your pharmacy and they will forward the refill request to us. Please allow 3 business days for your refill to be completed.          Additional Information About Your Visit        Ternhart Information     [employer] gives you secure access to your electronic health record. If you see a primary care provider, you can also send messages to your care team and make  "appointments. If you have questions, please call your primary care clinic.  If you do not have a primary care provider, please call [phone] and they will assist you.        Care EveryWhere ID     This is your Care EveryWhere ID. This could be used by other organizations to access your [place] medical records  [id number]        Your Vitals Were     Pulse Height BMI (Body Mass Index)             58 1.651 m (5' 5\") 27.94 kg/m2          Blood Pressure from Last 3 Encounters:   09/20/17 128/80   08/22/17 (!) 123/92   08/17/17 124/78    Weight from Last 3 Encounters:   09/20/17 76.2 kg (167 lb 14.4 oz)   08/17/17 75.3 kg (166 lb)   07/13/17 77.6 kg (171 lb)              We Performed the Following     EKG 12-lead complete w/read - Clinics (to be scheduled)     Follow-Up with Electrophysiologist          Where to get your medicines      These medications were sent to [facility] [address] [address]     Phone:  [phone]     amiodarone 200 MG tablet    metoprolol 25 MG 24 hr tablet          Primary Care Provider Office Phone # Fax #    Ann Irving [phone]       [facility] [address]  [facility] [address]        Equal Access to Services     HOLLIE LUIS AH: Hadii aad ku hadasho Soomaali, waaxda luqadaha, qaybta kaalmada adeegyada, eze kaiserin haymechelle doran . So [facility] [phone].    ATENCIÓN: Si habla español, tiene a dejesus disposición servicios gratuitos de asistencia lingüística. Rebeca al [phone].    We comply with applicable federal civil rights laws and [place] laws. We do not discriminate on the basis of race, color, national origin, age, disability sex, sexual orientation or gender identity.            Thank you!     Thank you for choosing [facility] [place]  for your care. Our goal is always to provide you with excellent care. Hearing " back from our patients is one way we can continue to improve our services. Please take a few minutes to complete the written survey that you may receive in the mail after your visit with us. Thank you!             Your Updated Medication List - Protect others around you: Learn how to safely use, store and throw away your medicines at www.disposemymeds.org.          This list is accurate as of: 9/20/17  8:38 AM.  Always use your most recent med list.                   Brand Name Dispense Instructions for use Diagnosis    amiodarone 200 MG tablet    PACERONE/CODARONE    30 tablet    Take 200 mg daily    Persistent atrial fibrillation (H)       apixaban ANTICOAGULANT 5 MG tablet    ELIQUIS    180 tablet    Take 1 tablet (5 mg) by mouth 2 times daily    Atrial fibrillation, unspecified type (H)       * Elastic Bandage Misc     1 each    1 each daily. Use it on the right leg as instructed.    Varicose veins       * JOBST FOR MEN KNEE HIGH/LG Misc     1 each    1 each daily Use it on the right leg as instructed    Varicose veins       iron 325 (65 FE) MG tablet      1 TABLET DAILY        metoprolol 25 MG 24 hr tablet    TOPROL XL    30 tablet    Take 1 tablet (25 mg) by mouth daily    Persistent atrial fibrillation (H)       nitroGLYcerin 0.4 MG sublingual tablet    NITROSTAT    25 tablet    For chest pain place 1 tablet under the tongue every 5 minutes for 3 doses. If symptoms persist 5 minutes after 1st dose call 911.    Chest pressure       order for DME     1 Device    Equipment being ordered: heel lift- MD    Achilles tendinosis, right       sildenafil 50 MG tablet    VIAGRA    36 tablet    Take 0.5-1 tablets (25-50 mg) by mouth daily as needed for erectile dysfunction Take 30 min to 4 hours before intercourse.  Never use with nitroglycerin, terazosin or doxazosin.    Erectile dysfunction       STATIN NOT PRESCRIBED (INTENTIONAL)      by Other route continuous prn.    Hypertension goal BP (blood pressure) < 140/90,  Hyperlipidemia LDL goal <130       * Notice:  This list has 2 medication(s) that are the same as other medications prescribed for you. Read the directions carefully, and ask your doctor or other care provider to review them with you.

## 2017-10-11 ENCOUNTER — TRANSFERRED RECORDS (OUTPATIENT)
Dept: HEALTH INFORMATION MANAGEMENT | Facility: CLINIC | Age: 72
End: 2017-10-11

## 2017-10-19 ENCOUNTER — TRANSFERRED RECORDS (OUTPATIENT)
Dept: HEALTH INFORMATION MANAGEMENT | Facility: CLINIC | Age: 72
End: 2017-10-19

## 2017-10-24 ENCOUNTER — TRANSFERRED RECORDS (OUTPATIENT)
Dept: HEALTH INFORMATION MANAGEMENT | Facility: CLINIC | Age: 72
End: 2017-10-24

## 2017-11-20 ENCOUNTER — TELEPHONE (OUTPATIENT)
Dept: CARDIOLOGY | Facility: CLINIC | Age: 72
End: 2017-11-20

## 2017-11-20 NOTE — TELEPHONE ENCOUNTER
Left message with patient to see if we could assist him in getting his PFT scheduled being that he is on amiodarone last August. He sees Dr. Aguayo on 11/27. I asked that he call us back. Tiarra

## 2017-11-27 ENCOUNTER — OFFICE VISIT (OUTPATIENT)
Dept: CARDIOLOGY | Facility: CLINIC | Age: 72
End: 2017-11-27
Attending: INTERNAL MEDICINE
Payer: COMMERCIAL

## 2017-11-27 VITALS
HEART RATE: 72 BPM | SYSTOLIC BLOOD PRESSURE: 104 MMHG | WEIGHT: 169 LBS | HEIGHT: 65 IN | DIASTOLIC BLOOD PRESSURE: 64 MMHG | BODY MASS INDEX: 28.16 KG/M2

## 2017-11-27 DIAGNOSIS — I48.19 PERSISTENT ATRIAL FIBRILLATION (H): Primary | ICD-10-CM

## 2017-11-27 PROCEDURE — 99214 OFFICE O/P EST MOD 30 MIN: CPT | Performed by: INTERNAL MEDICINE

## 2017-11-27 PROCEDURE — 93000 ELECTROCARDIOGRAM COMPLETE: CPT | Performed by: INTERNAL MEDICINE

## 2017-11-27 RX ORDER — FLECAINIDE ACETATE 100 MG/1
100 TABLET ORAL 2 TIMES DAILY
Qty: 60 TABLET | Refills: 3 | Status: SHIPPED | OUTPATIENT
Start: 2017-11-27 | End: 2018-02-13

## 2017-11-27 RX ORDER — METOPROLOL SUCCINATE 25 MG/1
25 TABLET, EXTENDED RELEASE ORAL DAILY
Qty: 30 TABLET | Refills: 3 | COMMUNITY
Start: 2017-11-27 | End: 2018-02-13

## 2017-11-27 NOTE — LETTER
11/27/2017    MIKE DEMPSEY  [facility]   [address]  [facility] [address]    RE: Sethsai MIKEY Niurkaepi       Dear Colleague,    I had the pleasure of seeing Charlene Gutierrez in the [facility].    Electrophysiology/ Clinic Note         H&P and Plan:     REASON FOR VISIT:  Evaluation of persistent atrial fibrillation.       HISTORY OF PRESENT ILLNESS:  Mr. Gutierrez is a pleasant 72-year-old gentleman with history of hypertension, hyperlipidemia, recurrent persistent AFib, who is here for evaluation.       The patient failed rhythm control strategy with flecainide and metoprolol, and underwent pulmonary vein isolation/ CTI ablation in 07/06.  Post-procedure, he had recurrence of AFib and underwent another successful cardioversion.  Flecainide was discontinued and he was loaded with amiodarone.       In August, he had recurrence of AFib and was very discouraged.  I recommend him to continue amiodarone and pursue another cardioversion, which he agreed.  The last cardioversion was successful.      At the moment, he is doing well and denies any major recurrence of Afib.  He denies any symptoms such as chest pain, shortness of breath, palpitation, lightheadedness, near syncope or syncope.      EKG done today showed normal sinus rhythm with QTc measuring 427 milliseconds and QRS 96  milliseconds.       ASSESSMENT AND PLAN:   1.  Recurrent persistent AFib.  His  completed the healing phase after the ablation.  We  discussed options including continue amiodarone indefinitely, discontinue or switch anti-arrhythmics.  He would like to change medications. I recommend the following:  - Stop Amio.  - Re-start flecainide 2 days after amio discontinuation (100 mg BID).  - Increase metoprolol dose to 25 mg daily.    2.  Embolic prevention.  CHADS-VASc score 2.  Continue anticoagulation with Eliquis definitely.    3.  Hypertension.  Blood pressure well controlled.   4. Lung nodule.   "Incident finding on CT pre-ablation. Pt is followed by Dr. Alex Ward and CT and Abd USG were recently repeated, which revealed stable lung nodule (8 mm in the RML) and small bilateral renal cyst.  He will continue imaging vigilance per Dr. Ward.          MERE FARFAN MD         Physical Exam:  Vitals: /64  Pulse 72  Ht 1.651 m (5' 5\")  Wt 76.7 kg (169 lb)  BMI 28.12 kg/m2    Constitutional:  AAO x3.  Pt is in NAD.  HEAD: normocephalic.  SKIN: Skin normal color, texture and turgor with no lesions or eruptions.  Eyes: PERRL, EOMI.  ENT:  Supple, normal JVP. No lymphadenopathy or thyroid enlargement.  Chest:  CTAB.  Cardiac:   RRR, normal  S1 and S2.  No murmurs rubs or gallop.    Abdomen:  Normal BS.  Soft, non-tender and non-distended.  No rebound or guarding.    Extremities:  Pedious pulses palpable B/L.  No LE edema noticed.   Neurological: Strength and sensation grossly symmetric and intact throughout.       CURRENT MEDICATIONS:  Current Outpatient Prescriptions   Medication Sig Dispense Refill     flecainide (TAMBOCOR) 100 MG tablet Take 1 tablet (100 mg) by mouth 2 times daily 60 tablet 3     metoprolol (TOPROL XL) 25 MG 24 hr tablet Take 1 tablet (25 mg) by mouth daily (Patient taking differently: Take 12.5 mg by mouth daily ) 30 tablet 3     apixaban ANTICOAGULANT (ELIQUIS) 5 MG tablet Take 1 tablet (5 mg) by mouth 2 times daily 180 tablet 1     nitroglycerin (NITROSTAT) 0.4 MG sublingual tablet For chest pain place 1 tablet under the tongue every 5 minutes for 3 doses. If symptoms persist 5 minutes after 1st dose call 911. 25 tablet 0     Elastic Bandages & Supports (JOBST FOR MEN KNEE HIGH/LG) MISC 1 each daily Use it on the right leg as instructed 1 each 1     sildenafil (VIAGRA) 50 MG tablet Take 0.5-1 tablets (25-50 mg) by mouth daily as needed for erectile dysfunction Take 30 min to 4 hours before intercourse.  Never use with nitroglycerin, terazosin or doxazosin. 36 tablet 0     " ORDER FOR DME Equipment being ordered: heel lift- MD 1 Device 0     Elastic Bandage MISC 1 each daily. Use it on the right leg as instructed. 1 each 1     STATIN NOT PRESCRIBED, INTENTIONAL, by Other route continuous prn.  0     IRON 325 (65 FE) MG OR TABS 1 TABLET DAILY         ALLERGIES   No Known Allergies    PAST MEDICAL HISTORY:  Past Medical History:   Diagnosis Date     CKD (chronic kidney disease) 2012     Gastric ulcer      HTN (hypertension), benign      Hyperlipidaemia      Paroxysmal atrial fibrillation (H) 11/17/2013       PAST SURGICAL HISTORY:  Past Surgical History:   Procedure Laterality Date     ANESTHESIA CARDIOVERSION N/A 6/5/2017    Procedure: ANESTHESIA CARDIOVERSION;  ANESTHESIA NEEDED FOR CARDIOVERSION IN CARE SUITES. (RHETT AT 0830);  Surgeon: GENERIC ANESTHESIA PROVIDER;  Location: [facility]     BRONCHOSCOPY       C INCISION OF PYLORIC MUSCLE       CARDIOVERSION  11/2013, 8/22/17     COLONOSCOPY       EXTRACAPSULAR CATARACT EXTRATION WITH INTRAOCULAR LENS IMPLANT  2003    right eye     H ABLATION FOCAL AFIB  07/06/2017       FAMILY HISTORY:  Family History   Problem Relation Age of Onset     CANCER Mother      CANCER Father      Colon Cancer Father      Prostate Cancer No family hx of        SOCIAL HISTORY:  Social History     Social History     Marital status:      Spouse name: N/A     Number of children: N/A     Years of education: N/A     Social History Main Topics     Smoking status: Former Smoker     Years: 38.00     Types: Cigarettes     Quit date: 8/1/2012     Smokeless tobacco: Never Used     Alcohol use Yes      Comment: ocass     Drug use: No     Sexual activity: Yes     Partners: Female     Other Topics Concern     Parent/Sibling W/ Cabg, Mi Or Angioplasty Before 65f 55m? No      Service Yes     Blood Transfusions No     Caffeine Concern No     Occupational Exposure No     Hobby Hazards No     Sleep Concern No     Stress Concern No     Weight Concern No     Special Diet  No     Back Care No     Exercise Yes     Bike Helmet No     Seat Belt Yes     Self-Exams No     Social History Narrative       Review of Systems:  Skin:  Negative     Eyes:  Positive for glasses  ENT:  Negative    Respiratory:  Negative    Cardiovascular:  Negative    Gastroenterology: Negative    Genitourinary:  Negative    Musculoskeletal:  Negative    Neurologic:  Negative    Psychiatric:  Negative    Heme/Lymph/Imm:  Negative    Endocrine:  Negative        Recent Lab Results:  LIPID RESULTS:  Lab Results   Component Value Date    CHOL 188 03/13/2017    HDL 46 03/13/2017     03/13/2017    TRIG 112 03/13/2017    CHOLHDLRATIO 4.1 03/13/2017    CHOLHDLRATIO 4.2 05/28/2015       LIVER ENZYME RESULTS:  Lab Results   Component Value Date    AST 25 08/17/2017    ALT 45 08/17/2017       CBC RESULTS:  Lab Results   Component Value Date    WBC 5.4 07/06/2017    RBC 4.11 (L) 07/06/2017    HGB 12.9 (L) 07/06/2017    HCT 37.2 (L) 07/06/2017    MCV 91 07/06/2017    MCH 31.4 07/06/2017    MCHC 34.7 07/06/2017    RDW 12.4 07/06/2017     07/06/2017       BMP RESULTS:  Lab Results   Component Value Date     07/06/2017    POTASSIUM 4.3 08/22/2017    CHLORIDE 109 07/06/2017    CO2 22 07/06/2017    ANIONGAP 8 07/06/2017    GLC 95 07/06/2017    BUN 17 07/06/2017    CR 0.94 07/06/2017    GFRESTIMATED 80 07/06/2017    GFRESTBLACK >90   GFR Calc   07/06/2017    CHUCKY 8.6 07/06/2017        A1C RESULTS:  Lab Results   Component Value Date    A1C 5.5 03/13/2017       INR RESULTS:  Lab Results   Component Value Date    INR 1.13 07/06/2017    INR 1.18 (H) 06/05/2017         ECHOCARDIOGRAM  No results found for this or any previous visit (from the past 8760 hour(s)).      Orders Placed This Encounter   Procedures     Follow-Up with Cardiac Advanced Practice Provider     EKG 12-lead complete w/read - Clinics (performed today)     EKG 12-lead complete w/read - Clinics (to be scheduled)     Orders Placed This  Encounter   Medications     flecainide (TAMBOCOR) 100 MG tablet     Sig: Take 1 tablet (100 mg) by mouth 2 times daily     Dispense:  60 tablet     Refill:  3     Medications Discontinued During This Encounter   Medication Reason     amiodarone (PACERONE/CODARONE) 200 MG tablet          Encounter Diagnosis   Name Primary?     Persistent atrial fibrillation (H) Yes     Thank you for allowing me to participate in the care of your patient.    Sincerely,     Sreekanth Aguayo MD     [facility]

## 2017-11-27 NOTE — PROGRESS NOTES
Electrophysiology/ Clinic Note         H&P and Plan:     REASON FOR VISIT:  Evaluation of persistent atrial fibrillation.       HISTORY OF PRESENT ILLNESS:  Mr. Gutierrez is a pleasant 72-year-old gentleman with history of hypertension, hyperlipidemia, recurrent persistent AFib, who is here for evaluation.       The patient failed rhythm control strategy with flecainide and metoprolol, and underwent pulmonary vein isolation/ CTI ablation in 07/06.  Post-procedure, he had recurrence of AFib and underwent another successful cardioversion.  Flecainide was discontinued and he was loaded with amiodarone.       In August, he had recurrence of AFib and was very discouraged.  I recommend him to continue amiodarone and pursue another cardioversion, which he agreed.  The last cardioversion was successful.      At the moment, he is doing well and denies any major recurrence of Afib.  He denies any symptoms such as chest pain, shortness of breath, palpitation, lightheadedness, near syncope or syncope.      EKG done today showed normal sinus rhythm with QTc measuring 427 milliseconds and QRS 96  milliseconds.       ASSESSMENT AND PLAN:   1.  Recurrent persistent AFib.  His completed the healing phase after the ablation.  We discussed options including continue amiodarone indefinitely, discontinue or switch anti-arrhythmics.  He would like to change medications. I recommend the following:  - Stop Amio.  - Re-start flecainide 2 days after amio discontinuation (100 mg BID).  - Increase metoprolol dose to 25 mg daily.    2.  Embolic prevention.  CHADS-VASc score 2.  Continue anticoagulation with Eliquis definitely.    3.  Hypertension.  Blood pressure well controlled.   4. Lung nodule.  Incident finding on CT pre-ablation. Pt is followed by Dr. Alex Ward and CT and Abd USG were recently repeated, which revealed stable lung nodule (8 mm in the RML) and small bilateral renal cyst.  He will continue imaging vigilance per   "Ed.          MERE FARFAN MD         Physical Exam:  Vitals: /64  Pulse 72  Ht 1.651 m (5' 5\")  Wt 76.7 kg (169 lb)  BMI 28.12 kg/m2    Constitutional:  AAO x3.  Pt is in NAD.  HEAD: normocephalic.  SKIN: Skin normal color, texture and turgor with no lesions or eruptions.  Eyes: PERRL, EOMI.  ENT:  Supple, normal JVP. No lymphadenopathy or thyroid enlargement.  Chest:  CTAB.  Cardiac:   RRR, normal  S1 and S2.  No murmurs rubs or gallop.    Abdomen:  Normal BS.  Soft, non-tender and non-distended.  No rebound or guarding.    Extremities:  Pedious pulses palpable B/L.  No LE edema noticed.   Neurological: Strength and sensation grossly symmetric and intact throughout.       CURRENT MEDICATIONS:  Current Outpatient Prescriptions   Medication Sig Dispense Refill     flecainide (TAMBOCOR) 100 MG tablet Take 1 tablet (100 mg) by mouth 2 times daily 60 tablet 3     metoprolol (TOPROL XL) 25 MG 24 hr tablet Take 1 tablet (25 mg) by mouth daily (Patient taking differently: Take 12.5 mg by mouth daily ) 30 tablet 3     apixaban ANTICOAGULANT (ELIQUIS) 5 MG tablet Take 1 tablet (5 mg) by mouth 2 times daily 180 tablet 1     nitroglycerin (NITROSTAT) 0.4 MG sublingual tablet For chest pain place 1 tablet under the tongue every 5 minutes for 3 doses. If symptoms persist 5 minutes after 1st dose call 911. 25 tablet 0     Elastic Bandages & Supports (JOBST FOR MEN KNEE HIGH/LG) MISC 1 each daily Use it on the right leg as instructed 1 each 1     sildenafil (VIAGRA) 50 MG tablet Take 0.5-1 tablets (25-50 mg) by mouth daily as needed for erectile dysfunction Take 30 min to 4 hours before intercourse.  Never use with nitroglycerin, terazosin or doxazosin. 36 tablet 0     ORDER FOR DME Equipment being ordered: heel lift- MD 1 Device 0     Elastic Bandage MISC 1 each daily. Use it on the right leg as instructed. 1 each 1     STATIN NOT PRESCRIBED, INTENTIONAL, by Other route continuous prn.  0     IRON 325 (65 FE) " MG OR TABS 1 TABLET DAILY         ALLERGIES   No Known Allergies    PAST MEDICAL HISTORY:  Past Medical History:   Diagnosis Date     CKD (chronic kidney disease) 2012     Gastric ulcer      HTN (hypertension), benign      Hyperlipidaemia      Paroxysmal atrial fibrillation (H) 11/17/2013       PAST SURGICAL HISTORY:  Past Surgical History:   Procedure Laterality Date     ANESTHESIA CARDIOVERSION N/A 6/5/2017    Procedure: ANESTHESIA CARDIOVERSION;  ANESTHESIA NEEDED FOR CARDIOVERSION IN CARE SUITES. (RHETT AT 0830);  Surgeon: GENERIC ANESTHESIA PROVIDER;  Location: [facility]     BRONCHOSCOPY       C INCISION OF PYLORIC MUSCLE       CARDIOVERSION  11/2013, 8/22/17     COLONOSCOPY       EXTRACAPSULAR CATARACT EXTRATION WITH INTRAOCULAR LENS IMPLANT  2003    right eye     H ABLATION FOCAL AFIB  07/06/2017       FAMILY HISTORY:  Family History   Problem Relation Age of Onset     CANCER Mother      CANCER Father      Colon Cancer Father      Prostate Cancer No family hx of        SOCIAL HISTORY:  Social History     Social History     Marital status:      Spouse name: N/A     Number of children: N/A     Years of education: N/A     Social History Main Topics     Smoking status: Former Smoker     Years: 38.00     Types: Cigarettes     Quit date: 8/1/2012     Smokeless tobacco: Never Used     Alcohol use Yes      Comment: ocass     Drug use: No     Sexual activity: Yes     Partners: Female     Other Topics Concern     Parent/Sibling W/ Cabg, Mi Or Angioplasty Before 65f 55m? No      Service Yes     Blood Transfusions No     Caffeine Concern No     Occupational Exposure No     Hobby Hazards No     Sleep Concern No     Stress Concern No     Weight Concern No     Special Diet No     Back Care No     Exercise Yes     Bike Helmet No     Seat Belt Yes     Self-Exams No     Social History Narrative       Review of Systems:  Skin:  Negative     Eyes:  Positive for glasses  ENT:  Negative    Respiratory:  Negative     Cardiovascular:  Negative    Gastroenterology: Negative    Genitourinary:  Negative    Musculoskeletal:  Negative    Neurologic:  Negative    Psychiatric:  Negative    Heme/Lymph/Imm:  Negative    Endocrine:  Negative        Recent Lab Results:  LIPID RESULTS:  Lab Results   Component Value Date    CHOL 188 03/13/2017    HDL 46 03/13/2017     03/13/2017    TRIG 112 03/13/2017    CHOLHDLRATIO 4.1 03/13/2017    CHOLHDLRATIO 4.2 05/28/2015       LIVER ENZYME RESULTS:  Lab Results   Component Value Date    AST 25 08/17/2017    ALT 45 08/17/2017       CBC RESULTS:  Lab Results   Component Value Date    WBC 5.4 07/06/2017    RBC 4.11 (L) 07/06/2017    HGB 12.9 (L) 07/06/2017    HCT 37.2 (L) 07/06/2017    MCV 91 07/06/2017    MCH 31.4 07/06/2017    MCHC 34.7 07/06/2017    RDW 12.4 07/06/2017     07/06/2017       BMP RESULTS:  Lab Results   Component Value Date     07/06/2017    POTASSIUM 4.3 08/22/2017    CHLORIDE 109 07/06/2017    CO2 22 07/06/2017    ANIONGAP 8 07/06/2017    GLC 95 07/06/2017    BUN 17 07/06/2017    CR 0.94 07/06/2017    GFRESTIMATED 80 07/06/2017    GFRESTBLACK >90   GFR Calc   07/06/2017    CHUCKY 8.6 07/06/2017        A1C RESULTS:  Lab Results   Component Value Date    A1C 5.5 03/13/2017       INR RESULTS:  Lab Results   Component Value Date    INR 1.13 07/06/2017    INR 1.18 (H) 06/05/2017         ECHOCARDIOGRAM  No results found for this or any previous visit (from the past 8760 hour(s)).      Orders Placed This Encounter   Procedures     Follow-Up with Cardiac Advanced Practice Provider     EKG 12-lead complete w/read - Clinics (performed today)     EKG 12-lead complete w/read - Clinics (to be scheduled)     Orders Placed This Encounter   Medications     flecainide (TAMBOCOR) 100 MG tablet     Sig: Take 1 tablet (100 mg) by mouth 2 times daily     Dispense:  60 tablet     Refill:  3     Medications Discontinued During This Encounter   Medication Reason      amiodarone (PACERONE/CODARONE) 200 MG tablet          Encounter Diagnosis   Name Primary?     Persistent atrial fibrillation (H) Yes         CC  Sreekanth Aguayo MD  [address]  [place] [address]

## 2017-11-27 NOTE — MR AVS SNAPSHOT
After Visit Summary   11/27/2017    Charlene Gutierrez    MRN: [id number]           Patient Information     Date Of Birth          1945        Visit Information        Provider Department      11/27/2017 7:45 AM Sreekanth Aguayo MD [facility]        Today's Diagnoses     Persistent atrial fibrillation (H)    -  1       Follow-ups after your visit        Additional Services     Follow-Up with Cardiac Advanced Practice Provider                 Your next 10 appointments already scheduled     Dec 05, 2017  2:00 PM CST   [facility] EP RETURN with Jyoti Luna PA-C   [facility] ([facility] Clinics)    [address] [place], [place] [address]  [facility] [id number] [phone]              Future tests that were ordered for you today     Open Future Orders        Priority Expected Expires Ordered    EKG 12-lead complete w/read - Clinics (to be scheduled) Routine 12/4/2017 11/27/2018 11/27/2017    Follow-Up with Cardiac Advanced Practice Provider Routine 12/4/2017 11/27/2018 11/27/2017            Who to contact     If you have questions or need follow up information about today's clinic visit or your schedule please contact [facility] directly at [phone].  Normal or non-critical lab and imaging results will be communicated to you by [employer], letter or phone within 4 business days after the clinic has received the results. If you do not hear from us within 7 days, please contact the clinic through [employer] or phone. If you have a critical or abnormal lab result, we will notify you by phone as soon as possible.  Submit refill requests through [employer] or call your pharmacy and they will forward the refill request to us. Please allow 3 business days for your refill to be completed.          Additional Information About Your Visit        [employer] Information     [employer] gives you secure  "access to your electronic health record. If you see a primary care provider, you can also send messages to your care team and make appointments. If you have questions, please call your primary care clinic.  If you do not have a primary care provider, please call [phone] and they will assist you.        Care EveryWhere ID     This is your Care EveryWhere ID. This could be used by other organizations to access your [place] medical records  [id number]        Your Vitals Were     Pulse Height BMI (Body Mass Index)             72 1.651 m (5' 5\") 28.12 kg/m2          Blood Pressure from Last 3 Encounters:   11/27/17 104/64   09/20/17 128/80   08/22/17 (!) 123/92    Weight from Last 3 Encounters:   11/27/17 76.7 kg (169 lb)   09/20/17 76.2 kg (167 lb 14.4 oz)   08/17/17 75.3 kg (166 lb)              We Performed the Following     EKG 12-lead complete w/read - Clinics (performed today)     Follow-Up with Electrophysiologist          Today's Medication Changes          These changes are accurate as of: 11/27/17  8:21 AM.  If you have any questions, ask your nurse or doctor.               Start taking these medicines.        Dose/Directions    flecainide 100 MG tablet   Commonly known as:  TAMBOCOR   Used for:  Persistent atrial fibrillation (H)   Started by:  Sreekanth Aguayo MD        Dose:  100 mg   Take 1 tablet (100 mg) by mouth 2 times daily   Quantity:  60 tablet   Refills:  3         These medicines have changed or have updated prescriptions.        Dose/Directions    metoprolol 25 MG 24 hr tablet   Commonly known as:  TOPROL XL   This may have changed:  how much to take   Used for:  Persistent atrial fibrillation (H)        Dose:  25 mg   Take 1 tablet (25 mg) by mouth daily   Quantity:  30 tablet   Refills:  3         Stop taking these medicines if you haven't already. Please contact your care team if you have questions.     amiodarone 200 MG tablet   Commonly known as:  PACERONE/CODARONE   Stopped by:  " Sreekanth Aguayo MD                Where to get your medicines      These medications were sent to [facility] - [place], [place] - [address] [facility]  [address] [facility] [address]     Phone:  [phone]     flecainide 100 MG tablet                Primary Care Provider Office Phone # Fax #    Ann Irving [phone]       [facility] [address]  [facility] [address]        Equal Access to Services     HOLLIE LUIS : Hadii aad ku hadasho Soomaali, waaxda luqadaha, qaybta kaalmada adeegyada, waxay idiin hayaan adeeg kharash la'aan ah. So [facility] [phone].    ATENCIÓN: Si habla español, tiene a dejesus disposición servicios gratuitos de asistencia lingüística. [facility] [phone].    We comply with applicable federal civil rights laws and [place] laws. We do not discriminate on the basis of race, color, national origin, age, disability, sex, sexual orientation, or gender identity.            Thank you!     Thank you for choosing [facility]  for your care. Our goal is always to provide you with excellent care. Hearing back from our patients is one way we can continue to improve our services. Please take a few minutes to complete the written survey that you may receive in the mail after your visit with us. Thank you!             Your Updated Medication List - Protect others around you: Learn how to safely use, store and throw away your medicines at www.disposemymeds.org.          This list is accurate as of: 11/27/17  8:21 AM.  Always use your most recent med list.                   Brand Name Dispense Instructions for use Diagnosis    apixaban ANTICOAGULANT 5 MG tablet    ELIQUIS    180 tablet    Take 1 tablet (5 mg) by mouth 2 times daily    Atrial fibrillation, unspecified type (H)       * Elastic Bandage Misc     1 each    1 each daily. Use it on the right leg as instructed.    Varicose veins       *  JOBST FOR MEN KNEE HIGH/LG Misc     1 each    1 each daily Use it on the right leg as instructed    Varicose veins       flecainide 100 MG tablet    TAMBOCOR    60 tablet    Take 1 tablet (100 mg) by mouth 2 times daily    Persistent atrial fibrillation (H)       iron 325 (65 FE) MG tablet      1 TABLET DAILY        metoprolol 25 MG 24 hr tablet    TOPROL XL    30 tablet    Take 1 tablet (25 mg) by mouth daily    Persistent atrial fibrillation (H)       nitroGLYcerin 0.4 MG sublingual tablet    NITROSTAT    25 tablet    For chest pain place 1 tablet under the tongue every 5 minutes for 3 doses. If symptoms persist 5 minutes after 1st dose call 911.    Chest pressure       order for DME     1 Device    Equipment being ordered: heel lift- MD    Achilles tendinosis, right       sildenafil 50 MG tablet    VIAGRA    36 tablet    Take 0.5-1 tablets (25-50 mg) by mouth daily as needed for erectile dysfunction Take 30 min to 4 hours before intercourse.  Never use with nitroglycerin, terazosin or doxazosin.    Erectile dysfunction       STATIN NOT PRESCRIBED (INTENTIONAL)      by Other route continuous prn.    Hypertension goal BP (blood pressure) < 140/90, Hyperlipidemia LDL goal <130       * Notice:  This list has 2 medication(s) that are the same as other medications prescribed for you. Read the directions carefully, and ask your doctor or other care provider to review them with you.

## 2017-12-05 ENCOUNTER — OFFICE VISIT (OUTPATIENT)
Dept: CARDIOLOGY | Facility: CLINIC | Age: 72
End: 2017-12-05
Attending: INTERNAL MEDICINE
Payer: COMMERCIAL

## 2017-12-05 VITALS
WEIGHT: 169.5 LBS | HEIGHT: 65 IN | DIASTOLIC BLOOD PRESSURE: 82 MMHG | HEART RATE: 67 BPM | SYSTOLIC BLOOD PRESSURE: 122 MMHG | BODY MASS INDEX: 28.24 KG/M2

## 2017-12-05 DIAGNOSIS — I48.19 PERSISTENT ATRIAL FIBRILLATION (H): ICD-10-CM

## 2017-12-05 PROCEDURE — 99213 OFFICE O/P EST LOW 20 MIN: CPT | Performed by: PHYSICIAN ASSISTANT

## 2017-12-05 PROCEDURE — 93000 ELECTROCARDIOGRAM COMPLETE: CPT | Performed by: PHYSICIAN ASSISTANT

## 2017-12-05 RX ORDER — LISINOPRIL 5 MG/1
5 TABLET ORAL DAILY
COMMUNITY
End: 2018-02-13

## 2017-12-05 NOTE — LETTER
12/5/2017    Ann Irving  [facility]  [address]  [place] [address]    RE: Sethsai MIKEY Gutierrez       Dear Colleague,    I had the pleasure of seeing Charlene Gutierrez in the [facility].    HPI:   I had the pleasure of seeing Charlene when he came for follow-up of his recent medication changes. He is a pleasant 9-year-old with hypertension, dyslipidemia and persistent atrial fibrillation. He failed flecainide and metoprolol therapy and underwent cavotricuspid isthmus and pulmonary vein isolation 7/2017. Unfortunately, he required DC cardioversion and addition of amiodarone for recurrent atrial arrhythmias, and then had another cardioversion required 8/2017. He continued on amiodarone therapy until he saw Dr. Aguayo just last week. At that time, they discussed the potential long-term complications of amiodarone use and he was switched to flecainide 100 mg twice daily. Metoprolol was increased to 25 mg daily and he was continued on Eliquis for a CHADSVASc score of 2.    Since then, he thinks he's done well. Specifically, he denies chest pain, pressure, palpitations or recurrent atrial fibrillation. He has always been quite symptomatic with this in the past.    RHETT 6/2017 showed EF 55-60%  EKG today showed SR @ 67 bpm with 1st degree AV Block. QRS duration 104 ms. EKG at his visit on 11/27 on amiodarone therapy showed sinus rhythm at 64 with a QRS duration of 96 ms.       Assessment & Plan:  1. Atrial Fibrillation -  he maintains sinus rhythm by exam, symptoms and EKG. He is tolerating the flecainide without issues and EKG today looks good.      PLAN:   * Continue flecainide 100 mg BID   * Continue metoprolol 25 mg daily   * Continue Eliquis 5 mg BID   * See Dr. Aguayo in 2-3 months with repeat EKG    2. HTN -  blood pressure was initially elevated, but improved nicely once we sat and talked for a little while.    PLAN:   * He will continue his current  medications.      Symone Luna PA-C, MSPAS      Orders Placed This Encounter   Procedures     Follow-Up with Electrophysiologist     EKG 12-lead complete w/read - Clinics     Orders Placed This Encounter   Medications     lisinopril (PRINIVIL/ZESTRIL) 5 MG tablet     Sig: Take 5 mg by mouth daily     There are no discontinued medications.      Encounter Diagnosis   Name Primary?     Persistent atrial fibrillation (H)        CURRENT MEDICATIONS:  Current Outpatient Prescriptions   Medication Sig Dispense Refill     lisinopril (PRINIVIL/ZESTRIL) 5 MG tablet Take 5 mg by mouth daily       flecainide (TAMBOCOR) 100 MG tablet Take 1 tablet (100 mg) by mouth 2 times daily 60 tablet 3     metoprolol (TOPROL XL) 25 MG 24 hr tablet Take 1 tablet (25 mg) by mouth daily 30 tablet 3     apixaban ANTICOAGULANT (ELIQUIS) 5 MG tablet Take 1 tablet (5 mg) by mouth 2 times daily 180 tablet 1     nitroglycerin (NITROSTAT) 0.4 MG sublingual tablet For chest pain place 1 tablet under the tongue every 5 minutes for 3 doses. If symptoms persist 5 minutes after 1st dose call 911. 25 tablet 0     sildenafil (VIAGRA) 50 MG tablet Take 0.5-1 tablets (25-50 mg) by mouth daily as needed for erectile dysfunction Take 30 min to 4 hours before intercourse.  Never use with nitroglycerin, terazosin or doxazosin. 36 tablet 0     IRON 325 (65 FE) MG OR TABS 1 TABLET DAILY       Elastic Bandages & Supports (JOBST FOR MEN KNEE HIGH/LG) MISC 1 each daily Use it on the right leg as instructed 1 each 1     ORDER FOR DME Equipment being ordered: heel lift- MD 1 Device 0     Elastic Bandage MISC 1 each daily. Use it on the right leg as instructed. 1 each 1     STATIN NOT PRESCRIBED, INTENTIONAL, by Other route continuous prn.  0       ALLERGIES   No Known Allergies    PAST MEDICAL HISTORY:  Past Medical History:   Diagnosis Date     CKD (chronic kidney disease) 2012     Gastric ulcer      HTN (hypertension), benign      Hyperlipidaemia      Paroxysmal atrial  fibrillation (H) 11/17/2013       PAST SURGICAL HISTORY:  Past Surgical History:   Procedure Laterality Date     ANESTHESIA CARDIOVERSION N/A 6/5/2017    Procedure: ANESTHESIA CARDIOVERSION;  ANESTHESIA NEEDED FOR CARDIOVERSION IN CARE SUITES. (RHETT AT 0830);  Surgeon: GENERIC ANESTHESIA PROVIDER;  Location: [facility]     BRONCHOSCOPY       C INCISION OF PYLORIC MUSCLE       CARDIOVERSION  11/2013, 8/22/17     COLONOSCOPY       EXTRACAPSULAR CATARACT EXTRATION WITH INTRAOCULAR LENS IMPLANT  2003    right eye     H ABLATION FOCAL AFIB  07/06/2017       FAMILY HISTORY:  Family History   Problem Relation Age of Onset     CANCER Mother      CANCER Father      Colon Cancer Father      Prostate Cancer No family hx of        SOCIAL HISTORY:  Social History     Social History     Marital status:      Spouse name: N/A     Number of children: N/A     Years of education: N/A     Social History Main Topics     Smoking status: Former Smoker     Years: 38.00     Types: Cigarettes     Quit date: 8/1/2012     Smokeless tobacco: Never Used     Alcohol use Yes      Comment: ocass     Drug use: No     Sexual activity: Yes     Partners: Female     Other Topics Concern     Parent/Sibling W/ Cabg, Mi Or Angioplasty Before 65f 55m? No      Service Yes     Blood Transfusions No     Caffeine Concern No     Occupational Exposure No     Hobby Hazards No     Sleep Concern No     Stress Concern No     Weight Concern No     Special Diet No     Back Care No     Exercise Yes     Bike Helmet No     Seat Belt Yes     Self-Exams No     Social History Narrative       Review of Systems:  Skin:  Negative     Eyes:  Positive for glasses  ENT:  Negative    Respiratory:  Negative for shortness of breath;dyspnea on exertion;cough  Cardiovascular:  Negative for;palpitations;exercise intolerance;lightheadedness;dizziness;fatigue;chest pain;edema    Gastroenterology: Negative for melena;hematochezia  Genitourinary:  Negative    Musculoskeletal:   "Negative    Neurologic:  Negative    Psychiatric:  Negative    Heme/Lymph/Imm:  Negative    Endocrine:  Negative      Physical Exam:  Vitals: /82  Pulse 67  Ht 1.651 m (5' 5\")  Wt 76.9 kg (169 lb 8 oz)  BMI 28.21 kg/m2    Constitutional:  cooperative, alert and oriented, well developed, well nourished, in no acute distress        Skin:  warm and dry to the touch, no apparent skin lesions or masses noted        Head:  normocephalic, no masses or lesions        Eyes:  conjunctivae and lids unremarkable;sclera white;pupils equal and round;no xanthalasma        ENT:  no pallor or cyanosis, dentition good        Neck:  not assessed this visit        Chest:  normal breath sounds, clear to auscultation, normal A-P diameter, normal symmetry, normal respiratory excursion, no use of accessory muscles        Cardiac: no murmurs, gallops or rubs detected;regular rhythm                  Abdomen:  not assessed this visit        Vascular: not assessed this visit                                      Extremities and Back:  no deformities, clubbing, cyanosis, erythema observed;no edema        Neurological:  no gross motor deficits          Recent Lab Results:  LIPID RESULTS:  Lab Results   Component Value Date    CHOL 188 03/13/2017    HDL 46 03/13/2017     03/13/2017    TRIG 112 03/13/2017    CHOLHDLRATIO 4.1 03/13/2017    CHOLHDLRATIO 4.2 05/28/2015       LIVER ENZYME RESULTS:  Lab Results   Component Value Date    AST 25 08/17/2017    ALT 45 08/17/2017       CBC RESULTS:  Lab Results   Component Value Date    WBC 5.4 07/06/2017    RBC 4.11 (L) 07/06/2017    HGB 12.9 (L) 07/06/2017    HCT 37.2 (L) 07/06/2017    MCV 91 07/06/2017    MCH 31.4 07/06/2017    MCHC 34.7 07/06/2017    RDW 12.4 07/06/2017     07/06/2017       BMP RESULTS:  Lab Results   Component Value Date     07/06/2017    POTASSIUM 4.3 08/22/2017    CHLORIDE 109 07/06/2017    CO2 22 07/06/2017    ANIONGAP 8 07/06/2017    GLC 95 07/06/2017 "    BUN 17 07/06/2017    CR 0.94 07/06/2017    GFRESTIMATED 80 07/06/2017    GFRESTBLACK >90   GFR Calc   07/06/2017    CHUCKY 8.6 07/06/2017        A1C RESULTS:  Lab Results   Component Value Date    A1C 5.5 03/13/2017       INR RESULTS:  Lab Results   Component Value Date    INR 1.13 07/06/2017    INR 1.18 (H) 06/05/2017           CC  [facility]  [address]  [place] [address]                    Thank you for allowing me to participate in the care of your patient.      Sincerely,     Jyoti Luna PA-C     [facility]    cc:   Mike [facility]  [address]  [place] [address]    [facility] [address]  [facility] [address]

## 2017-12-05 NOTE — MR AVS SNAPSHOT
After Visit Summary   12/5/2017    Charlene Gutierrez    MRN: [id number]           Patient Information     Date Of Birth          1945        Visit Information        Provider Department      12/5/2017 2:00 PM Jyoti Luna PA-C [facility]        Today's Diagnoses     Persistent atrial fibrillation (H)          Care Instructions    1. EKG today looks good with good intervals on the flecainide and metoprolol.    2. BP high initially but improved when I rechecked.    3. No changes today.     4. See Dr. Aguayo in ~ 2-3 months with EKG but CALL if any issues prior! [phone] (Hailey, Travis and Heavenly)          Follow-ups after your visit        Additional Services     Follow-Up with Electrophysiologist                 Future tests that were ordered for you today     Open Future Orders        Priority Expected Expires Ordered    EKG 12-lead complete w/read - Clinics Routine 2/5/2018 12/5/2018 12/5/2017    Follow-Up with Electrophysiologist Routine 2/5/2018 12/5/2018 12/5/2017            Who to contact     If you have questions or need follow up information about today's clinic visit or your schedule please contact [facility] directly at [phone].  Normal or non-critical lab and imaging results will be communicated to you by [employer], letter or phone within 4 business days after the clinic has received the results. If you do not hear from us within 7 days, please contact the clinic through [employer] or phone. If you have a critical or abnormal lab result, we will notify you by phone as soon as possible.  Submit refill requests through [employer] or call your pharmacy and they will forward the refill request to us. Please allow 3 business days for your refill to be completed.          Additional Information About Your Visit        [employer] Information     [employer] gives you secure access to your electronic health record. If  "you see a primary care provider, you can also send messages to your care team and make appointments. If you have questions, please call your primary care clinic.  If you do not have a primary care provider, please call [phone] and they will assist you.        Care EveryWhere ID     This is your Care EveryWhere ID. This could be used by other organizations to access your [place] medical records  [id number]        Your Vitals Were     Pulse Height BMI (Body Mass Index)             67 1.651 m (5' 5\") 28.21 kg/m2          Blood Pressure from Last 3 Encounters:   12/05/17 (!) 150/92   11/27/17 104/64   09/20/17 128/80    Weight from Last 3 Encounters:   12/05/17 76.9 kg (169 lb 8 oz)   11/27/17 76.7 kg (169 lb)   09/20/17 76.2 kg (167 lb 14.4 oz)              We Performed the Following     EKG 12-lead complete w/read - Clinics (to be scheduled)     Follow-Up with Cardiac Advanced Practice Provider        Primary Care Provider Office Phone # Fax #    Ann Irving [phone]       [facility] [address]  [facility] [address]        Equal Access to Services     HOLLIE LUIS AH: Hadii mirian ku hadasho Soomaali, waaxda luqadaha, qaybta kaalmada adeegyada, eze dumont. So [facility] [phone].    ATENCIÓN: Si habla español, tiene a dejesus disposición servicios gratuitos de asistencia lingüística. Llame al [phone].    We comply with applicable federal civil rights laws and [place] laws. We do not discriminate on the basis of race, color, national origin, age, disability, sex, sexual orientation, or gender identity.            Thank you!     Thank you for choosing [facility]  for your care. Our goal is always to provide you with excellent care. Hearing back from our patients is one way we can continue to improve our services. Please take a few minutes to complete the written survey that you may receive in the mail after " your visit with us. Thank you!             Your Updated Medication List - Protect others around you: Learn how to safely use, store and throw away your medicines at www.disposemymeds.org.          This list is accurate as of: 12/5/17  2:22 PM.  Always use your most recent med list.                   Brand Name Dispense Instructions for use Diagnosis    apixaban ANTICOAGULANT 5 MG tablet    ELIQUIS    180 tablet    Take 1 tablet (5 mg) by mouth 2 times daily    Atrial fibrillation, unspecified type (H)       * Elastic Bandage Misc     1 each    1 each daily. Use it on the right leg as instructed.    Varicose veins       * JOBST FOR MEN KNEE HIGH/LG Misc     1 each    1 each daily Use it on the right leg as instructed    Varicose veins       flecainide 100 MG tablet    TAMBOCOR    60 tablet    Take 1 tablet (100 mg) by mouth 2 times daily    Persistent atrial fibrillation (H)       iron 325 (65 FE) MG tablet      1 TABLET DAILY        lisinopril 5 MG tablet    PRINIVIL/ZESTRIL     Take 5 mg by mouth daily        nitroGLYcerin 0.4 MG sublingual tablet    NITROSTAT    25 tablet    For chest pain place 1 tablet under the tongue every 5 minutes for 3 doses. If symptoms persist 5 minutes after 1st dose call 911.    Chest pressure       order for DME     1 Device    Equipment being ordered: heel lift- MD    Achilles tendinosis, right       sildenafil 50 MG tablet    VIAGRA    36 tablet    Take 0.5-1 tablets (25-50 mg) by mouth daily as needed for erectile dysfunction Take 30 min to 4 hours before intercourse.  Never use with nitroglycerin, terazosin or doxazosin.    Erectile dysfunction       STATIN NOT PRESCRIBED (INTENTIONAL)      by Other route continuous prn.    Hypertension goal BP (blood pressure) < 140/90, Hyperlipidemia LDL goal <130       TOPROL XL 25 MG 24 hr tablet   Generic drug:  metoprolol     30 tablet    Take 1 tablet (25 mg) by mouth daily    Persistent atrial fibrillation (H)       * Notice:  This list has  2 medication(s) that are the same as other medications prescribed for you. Read the directions carefully, and ask your doctor or other care provider to review them with you.

## 2017-12-05 NOTE — PROGRESS NOTES
HPI:   I had the pleasure of seeing Charlene when he came for follow-up of his recent medication changes. He is a pleasant 9-year-old with hypertension, dyslipidemia and persistent atrial fibrillation. He failed flecainide and metoprolol therapy and underwent cavotricuspid isthmus and pulmonary vein isolation 7/2017. Unfortunately, he required DC cardioversion and addition of amiodarone for recurrent atrial arrhythmias, and then had another cardioversion required 8/2017. He continued on amiodarone therapy until he saw Dr. Aguayo just last week. At that time, they discussed the potential long-term complications of amiodarone use and he was switched to flecainide 100 mg twice daily. Metoprolol was increased to 25 mg daily and he was continued on Eliquis for a CHADSVASc score of 2.    Since then, he thinks he's done well. Specifically, he denies chest pain, pressure, palpitations or recurrent atrial fibrillation. He has always been quite symptomatic with this in the past.    RHETT 6/2017 showed EF 55-60%  EKG today showed SR @ 67 bpm with 1st degree AV Block. QRS duration 104 ms. EKG at his visit on 11/27 on amiodarone therapy showed sinus rhythm at 64 with a QRS duration of 96 ms.       Assessment & Plan:  1. Atrial Fibrillation -  he maintains sinus rhythm by exam, symptoms and EKG. He is tolerating the flecainide without issues and EKG today looks good.      PLAN:   * Continue flecainide 100 mg BID   * Continue metoprolol 25 mg daily   * Continue Eliquis 5 mg BID   * See Dr. Aguayo in 2-3 months with repeat EKG    2. HTN -  blood pressure was initially elevated, but improved nicely once we sat and talked for a little while.    PLAN:   * He will continue his current medications.      Symone Luna PA-C, MSPAS      Orders Placed This Encounter   Procedures     Follow-Up with Electrophysiologist     EKG 12-lead complete w/read - Clinics     Orders Placed This Encounter   Medications     lisinopril (PRINIVIL/ZESTRIL) 5 MG  tablet     Sig: Take 5 mg by mouth daily     There are no discontinued medications.      Encounter Diagnosis   Name Primary?     Persistent atrial fibrillation (H)        CURRENT MEDICATIONS:  Current Outpatient Prescriptions   Medication Sig Dispense Refill     lisinopril (PRINIVIL/ZESTRIL) 5 MG tablet Take 5 mg by mouth daily       flecainide (TAMBOCOR) 100 MG tablet Take 1 tablet (100 mg) by mouth 2 times daily 60 tablet 3     metoprolol (TOPROL XL) 25 MG 24 hr tablet Take 1 tablet (25 mg) by mouth daily 30 tablet 3     apixaban ANTICOAGULANT (ELIQUIS) 5 MG tablet Take 1 tablet (5 mg) by mouth 2 times daily 180 tablet 1     nitroglycerin (NITROSTAT) 0.4 MG sublingual tablet For chest pain place 1 tablet under the tongue every 5 minutes for 3 doses. If symptoms persist 5 minutes after 1st dose call 911. 25 tablet 0     sildenafil (VIAGRA) 50 MG tablet Take 0.5-1 tablets (25-50 mg) by mouth daily as needed for erectile dysfunction Take 30 min to 4 hours before intercourse.  Never use with nitroglycerin, terazosin or doxazosin. 36 tablet 0     IRON 325 (65 FE) MG OR TABS 1 TABLET DAILY       Elastic Bandages & Supports (JOBST FOR MEN KNEE HIGH/LG) MISC 1 each daily Use it on the right leg as instructed 1 each 1     ORDER FOR DME Equipment being ordered: heel lift- MD 1 Device 0     Elastic Bandage MISC 1 each daily. Use it on the right leg as instructed. 1 each 1     STATIN NOT PRESCRIBED, INTENTIONAL, by Other route continuous prn.  0       ALLERGIES   No Known Allergies    PAST MEDICAL HISTORY:  Past Medical History:   Diagnosis Date     CKD (chronic kidney disease) 2012     Gastric ulcer      HTN (hypertension), benign      Hyperlipidaemia      Paroxysmal atrial fibrillation (H) 11/17/2013       PAST SURGICAL HISTORY:  Past Surgical History:   Procedure Laterality Date     ANESTHESIA CARDIOVERSION N/A 6/5/2017    Procedure: ANESTHESIA CARDIOVERSION;  ANESTHESIA NEEDED FOR CARDIOVERSION IN CARE SUITES. (RHETT AT  "0830);  Surgeon: GENERIC ANESTHESIA PROVIDER;  Location: [facility]     BRONCHOSCOPY       C INCISION OF PYLORIC MUSCLE       CARDIOVERSION  11/2013, 8/22/17     COLONOSCOPY       EXTRACAPSULAR CATARACT EXTRATION WITH INTRAOCULAR LENS IMPLANT  2003    right eye     H ABLATION FOCAL AFIB  07/06/2017       FAMILY HISTORY:  Family History   Problem Relation Age of Onset     CANCER Mother      CANCER Father      Colon Cancer Father      Prostate Cancer No family hx of        SOCIAL HISTORY:  Social History     Social History     Marital status:      Spouse name: N/A     Number of children: N/A     Years of education: N/A     Social History Main Topics     Smoking status: Former Smoker     Years: 38.00     Types: Cigarettes     Quit date: 8/1/2012     Smokeless tobacco: Never Used     Alcohol use Yes      Comment: ocass     Drug use: No     Sexual activity: Yes     Partners: Female     Other Topics Concern     Parent/Sibling W/ Cabg, Mi Or Angioplasty Before 65f 55m? No      Service Yes     Blood Transfusions No     Caffeine Concern No     Occupational Exposure No     Hobby Hazards No     Sleep Concern No     Stress Concern No     Weight Concern No     Special Diet No     Back Care No     Exercise Yes     Bike Helmet No     Seat Belt Yes     Self-Exams No     Social History Narrative       Review of Systems:  Skin:  Negative     Eyes:  Positive for glasses  ENT:  Negative    Respiratory:  Negative for shortness of breath;dyspnea on exertion;cough  Cardiovascular:  Negative for;palpitations;exercise intolerance;lightheadedness;dizziness;fatigue;chest pain;edema    Gastroenterology: Negative for melena;hematochezia  Genitourinary:  Negative    Musculoskeletal:  Negative    Neurologic:  Negative    Psychiatric:  Negative    Heme/Lymph/Imm:  Negative    Endocrine:  Negative      Physical Exam:  Vitals: /82  Pulse 67  Ht 1.651 m (5' 5\")  Wt 76.9 kg (169 lb 8 oz)  BMI 28.21 kg/m2    Constitutional:  " cooperative, alert and oriented, well developed, well nourished, in no acute distress        Skin:  warm and dry to the touch, no apparent skin lesions or masses noted        Head:  normocephalic, no masses or lesions        Eyes:  conjunctivae and lids unremarkable;sclera white;pupils equal and round;no xanthalasma        ENT:  no pallor or cyanosis, dentition good        Neck:  not assessed this visit        Chest:  normal breath sounds, clear to auscultation, normal A-P diameter, normal symmetry, normal respiratory excursion, no use of accessory muscles        Cardiac: no murmurs, gallops or rubs detected;regular rhythm                  Abdomen:  not assessed this visit        Vascular: not assessed this visit                                      Extremities and Back:  no deformities, clubbing, cyanosis, erythema observed;no edema        Neurological:  no gross motor deficits          Recent Lab Results:  LIPID RESULTS:  Lab Results   Component Value Date    CHOL 188 03/13/2017    HDL 46 03/13/2017     03/13/2017    TRIG 112 03/13/2017    CHOLHDLRATIO 4.1 03/13/2017    CHOLHDLRATIO 4.2 05/28/2015       LIVER ENZYME RESULTS:  Lab Results   Component Value Date    AST 25 08/17/2017    ALT 45 08/17/2017       CBC RESULTS:  Lab Results   Component Value Date    WBC 5.4 07/06/2017    RBC 4.11 (L) 07/06/2017    HGB 12.9 (L) 07/06/2017    HCT 37.2 (L) 07/06/2017    MCV 91 07/06/2017    MCH 31.4 07/06/2017    MCHC 34.7 07/06/2017    RDW 12.4 07/06/2017     07/06/2017       BMP RESULTS:  Lab Results   Component Value Date     07/06/2017    POTASSIUM 4.3 08/22/2017    CHLORIDE 109 07/06/2017    CO2 22 07/06/2017    ANIONGAP 8 07/06/2017    GLC 95 07/06/2017    BUN 17 07/06/2017    CR 0.94 07/06/2017    GFRESTIMATED 80 07/06/2017    GFRESTBLACK >90   GFR Calc   07/06/2017    CHUCKY 8.6 07/06/2017        A1C RESULTS:  Lab Results   Component Value Date    A1C 5.5 03/13/2017       INR  RESULTS:  Lab Results   Component Value Date    INR 1.13 07/06/2017    INR 1.18 (H) 06/05/2017           CC  Ann Irving  [facility]  [address]  [place] [address]

## 2017-12-27 ENCOUNTER — TELEPHONE (OUTPATIENT)
Dept: CARDIOLOGY | Facility: CLINIC | Age: 72
End: 2017-12-27

## 2018-02-13 ENCOUNTER — OFFICE VISIT (OUTPATIENT)
Dept: CARDIOLOGY | Facility: CLINIC | Age: 73
End: 2018-02-13
Attending: PHYSICIAN ASSISTANT
Payer: COMMERCIAL

## 2018-02-13 VITALS
DIASTOLIC BLOOD PRESSURE: 80 MMHG | HEIGHT: 65 IN | BODY MASS INDEX: 28.66 KG/M2 | HEART RATE: 62 BPM | WEIGHT: 172 LBS | SYSTOLIC BLOOD PRESSURE: 132 MMHG

## 2018-02-13 DIAGNOSIS — I48.19 PERSISTENT ATRIAL FIBRILLATION (H): Primary | ICD-10-CM

## 2018-02-13 DIAGNOSIS — I48.91 ATRIAL FIBRILLATION, UNSPECIFIED TYPE (H): ICD-10-CM

## 2018-02-13 DIAGNOSIS — I10 BENIGN ESSENTIAL HYPERTENSION: ICD-10-CM

## 2018-02-13 PROCEDURE — 99214 OFFICE O/P EST MOD 30 MIN: CPT | Performed by: INTERNAL MEDICINE

## 2018-02-13 PROCEDURE — 93000 ELECTROCARDIOGRAM COMPLETE: CPT | Performed by: INTERNAL MEDICINE

## 2018-02-13 RX ORDER — FLECAINIDE ACETATE 100 MG/1
100 TABLET ORAL 2 TIMES DAILY
Qty: 60 TABLET | Refills: 3 | Status: SHIPPED | OUTPATIENT
Start: 2018-02-13 | End: 2018-05-29

## 2018-02-13 RX ORDER — LISINOPRIL 5 MG/1
5 TABLET ORAL DAILY
Qty: 30 TABLET | Refills: 3 | Status: SHIPPED | OUTPATIENT
Start: 2018-02-13 | End: 2018-05-29

## 2018-02-13 RX ORDER — METOPROLOL SUCCINATE 25 MG/1
25 TABLET, EXTENDED RELEASE ORAL DAILY
Qty: 30 TABLET | Refills: 3 | Status: SHIPPED | OUTPATIENT
Start: 2018-02-13 | End: 2018-05-29

## 2018-02-13 NOTE — LETTER
"2/13/2018    MIKE DEMPSEY  [facility] [address]  [facility] [address]    RE: Charlene Gutierrez       Dear Colleague,    I had the pleasure of seeing Charlene Gutierrez in the [facility].    [id number]    Electrophysiology/ Clinic Note         H&P and Plan:            Sreekanth Aguayo MD    Physical Exam:  Vitals: /80  Pulse 62  Ht 1.651 m (5' 5\")  Wt 78 kg (172 lb)  BMI 28.62 kg/m2    Constitutional:  AAO x3.  Pt is in NAD.  HEAD: normocephalic.  SKIN: Skin normal color, texture and turgor with no lesions or eruptions.  Eyes: PERRL, EOMI.  ENT:  Supple, normal JVP. No lymphadenopathy or thyroid enlargement.  Chest:  CTAB.  Cardiac:   RRR, normal  S1 and S2.  No murmurs rubs or gallop.   Abdomen:  Normal BS.  Soft, non-tender and non-distended.  No rebound or guarding.    Extremities:  Pedious pulses palpable B/L.  No LE edema noticed.   Neurological: Strength and sensation grossly symmetric and intact throughout.       CURRENT MEDICATIONS:  Current Outpatient Prescriptions   Medication Sig Dispense Refill     apixaban ANTICOAGULANT (ELIQUIS) 5 MG tablet Take 1 tablet (5 mg) by mouth 2 times daily 180 tablet 1     flecainide (TAMBOCOR) 100 MG tablet Take 1 tablet (100 mg) by mouth 2 times daily 60 tablet 3     metoprolol succinate (TOPROL XL) 25 MG 24 hr tablet Take 1 tablet (25 mg) by mouth daily 30 tablet 3     lisinopril (PRINIVIL/ZESTRIL) 5 MG tablet Take 1 tablet (5 mg) by mouth daily 30 tablet 3     nitroglycerin (NITROSTAT) 0.4 MG sublingual tablet For chest pain place 1 tablet under the tongue every 5 minutes for 3 doses. If symptoms persist 5 minutes after 1st dose call 911. 25 tablet 0     Elastic Bandages & Supports (JOBST FOR MEN KNEE HIGH/LG) MISC 1 each daily Use it on the right leg as instructed 1 each 1     sildenafil (VIAGRA) 50 MG tablet Take 0.5-1 tablets (25-50 mg) by mouth daily as needed for erectile dysfunction Take 30 min to 4 hours " before intercourse.  Never use with nitroglycerin, terazosin or doxazosin. 36 tablet 0     ORDER FOR DME Equipment being ordered: heel lift- MD 1 Device 0     Elastic Bandage MISC 1 each daily. Use it on the right leg as instructed. 1 each 1     STATIN NOT PRESCRIBED, INTENTIONAL, by Other route continuous prn.  0     IRON 325 (65 FE) MG OR TABS 1 TABLET DAILY       [DISCONTINUED] lisinopril (PRINIVIL/ZESTRIL) 5 MG tablet Take 5 mg by mouth daily       [DISCONTINUED] flecainide (TAMBOCOR) 100 MG tablet Take 1 tablet (100 mg) by mouth 2 times daily 60 tablet 3     [DISCONTINUED] metoprolol (TOPROL XL) 25 MG 24 hr tablet Take 1 tablet (25 mg) by mouth daily 30 tablet 3       ALLERGIES   No Known Allergies    PAST MEDICAL HISTORY:  Past Medical History:   Diagnosis Date     CKD (chronic kidney disease) 2012     Gastric ulcer      HTN (hypertension), benign      Hyperlipidaemia      Paroxysmal atrial fibrillation (H) 11/17/2013       PAST SURGICAL HISTORY:  Past Surgical History:   Procedure Laterality Date     ANESTHESIA CARDIOVERSION N/A 6/5/2017    Procedure: ANESTHESIA CARDIOVERSION;  ANESTHESIA NEEDED FOR CARDIOVERSION IN CARE SUITES. (RHETT AT 0830);  Surgeon: GENERIC ANESTHESIA PROVIDER;  Location: [facility] OR     BRONCHOSCOPY       C INCISION OF PYLORIC MUSCLE       CARDIOVERSION  11/2013, 8/22/17     COLONOSCOPY       EXTRACAPSULAR CATARACT EXTRATION WITH INTRAOCULAR LENS IMPLANT  2003    right eye     H ABLATION FOCAL AFIB  07/06/2017       FAMILY HISTORY:  Family History   Problem Relation Age of Onset     CANCER Mother      CANCER Father      Colon Cancer Father      Prostate Cancer No family hx of        SOCIAL HISTORY:  Social History     Social History     Marital status:      Spouse name: N/A     Number of children: N/A     Years of education: N/A     Social History Main Topics     Smoking status: Former Smoker     Years: 38.00     Types: Cigarettes     Quit date: 8/1/2012     Smokeless tobacco: Never  Used     Alcohol use Yes      Comment: ocass     Drug use: No     Sexual activity: Yes     Partners: Female     Other Topics Concern     Parent/Sibling W/ Cabg, Mi Or Angioplasty Before 65f 55m? No      Service Yes     Blood Transfusions No     Caffeine Concern No     Occupational Exposure No     Hobby Hazards No     Sleep Concern No     Stress Concern No     Weight Concern No     Special Diet No     Back Care No     Exercise Yes     Bike Helmet No     Seat Belt Yes     Self-Exams No     Social History Narrative       Review of Systems:  Skin:  Negative     Eyes:  Positive for glasses  ENT:  Negative    Respiratory:  Negative for shortness of breath;dyspnea on exertion;cough  Cardiovascular:  Negative for;palpitations;exercise intolerance;lightheadedness;dizziness;fatigue;chest pain;edema    Gastroenterology: Negative for melena;hematochezia  Genitourinary:  Negative    Musculoskeletal:  Negative    Neurologic:  Negative    Psychiatric:  Negative    Heme/Lymph/Imm:  Negative    Endocrine:  Negative        Recent Lab Results:  LIPID RESULTS:  Lab Results   Component Value Date    CHOL 188 03/13/2017    HDL 46 03/13/2017     03/13/2017    TRIG 112 03/13/2017    CHOLHDLRATIO 4.1 03/13/2017    CHOLHDLRATIO 4.2 05/28/2015       LIVER ENZYME RESULTS:  Lab Results   Component Value Date    AST 25 08/17/2017    ALT 45 08/17/2017       CBC RESULTS:  Lab Results   Component Value Date    WBC 5.4 07/06/2017    RBC 4.11 (L) 07/06/2017    HGB 12.9 (L) 07/06/2017    HCT 37.2 (L) 07/06/2017    MCV 91 07/06/2017    MCH 31.4 07/06/2017    MCHC 34.7 07/06/2017    RDW 12.4 07/06/2017     07/06/2017       BMP RESULTS:  Lab Results   Component Value Date     07/06/2017    POTASSIUM 4.3 08/22/2017    CHLORIDE 109 07/06/2017    CO2 22 07/06/2017    ANIONGAP 8 07/06/2017    GLC 95 07/06/2017    BUN 17 07/06/2017    CR 0.94 07/06/2017    GFRESTIMATED 80 07/06/2017    GFRESTBLACK >90   GFR Calc    07/06/2017    CHUCKY 8.6 07/06/2017        A1C RESULTS:  Lab Results   Component Value Date    A1C 5.5 03/13/2017       INR RESULTS:  Lab Results   Component Value Date    INR 1.13 07/06/2017    INR 1.18 (H) 06/05/2017         ECHOCARDIOGRAM  No results found for this or any previous visit (from the past 8760 hour(s)).      Orders Placed This Encounter   Procedures     Exercise Stress Echocardiogram     Orders Placed This Encounter   Medications     apixaban ANTICOAGULANT (ELIQUIS) 5 MG tablet     Sig: Take 1 tablet (5 mg) by mouth 2 times daily     Dispense:  180 tablet     Refill:  1     flecainide (TAMBOCOR) 100 MG tablet     Sig: Take 1 tablet (100 mg) by mouth 2 times daily     Dispense:  60 tablet     Refill:  3     metoprolol succinate (TOPROL XL) 25 MG 24 hr tablet     Sig: Take 1 tablet (25 mg) by mouth daily     Dispense:  30 tablet     Refill:  3     lisinopril (PRINIVIL/ZESTRIL) 5 MG tablet     Sig: Take 1 tablet (5 mg) by mouth daily     Dispense:  30 tablet     Refill:  3     Medications Discontinued During This Encounter   Medication Reason     apixaban ANTICOAGULANT (ELIQUIS) 5 MG tablet Reorder     flecainide (TAMBOCOR) 100 MG tablet Reorder     metoprolol (TOPROL XL) 25 MG 24 hr tablet Reorder     lisinopril (PRINIVIL/ZESTRIL) 5 MG tablet Reorder         Encounter Diagnoses   Name Primary?     Persistent atrial fibrillation (H) Yes     Atrial fibrillation, unspecified type (H)      Benign essential hypertension          CC  Jyoti Luna PA-C  [address]  [place] [id number]                Thank you for allowing me to participate in the care of your patient.      Sincerely,     Sreekanth Aguayo MD     [facility]    cc:   Jyoti Luna PA-C  [address]  [place] [id number]

## 2018-02-13 NOTE — LETTER
2/13/2018      MIKE DEMPSEY  [facility] [address]  [facility] [address]      RE: Charlene Gutierrez       Dear Colleague,    I had the pleasure of seeing Charlene Gutierrez in the [facility].    Service Date: 02/13/2018      REASON FOR VISIT:  Evaluation of persistent atrial fibrillation.      HISTORY OF PRESENT ILLNESS:  Mr. Gutierrez is a pleasant 72-year-old gentleman with history of hypertension, hyperlipidemia and recurrent persistent AFib who failed therapy with flecainide and underwent pulmonary vein isolation 07/06/2017.  He is here today for routine followup.      The patient failed therapy with flecainide and metoprolol.  He underwent ablation 07/06/2017.  Post-procedure he had recurrence of AFib requiring cardioversion and a brief period of amiodarone.  Later in September we switched amio to flecainide.      At the moment, he is doing well.  He informs he has not had any recurrence of atrial fibrillation.  He denies any symptoms such as chest pain, shortness of breath, lightheadedness, near-syncope or syncopal episode.      EKG done here today showed normal sinus rhythm with QRS measuring 106 milliseconds, which is unchanged from baseline.  He had a nuclear stress test done in 06/2017, which was negative for ischemia and revealed normal cardiac function.  An echocardiogram obtained on 06/05/2017 was a RHETT and revealed severe dilated left atrium with an EF of 55%-60%.      ASSESSMENT AND PLAN:   1.  Recurrent persistent AFib.  Responded well to ablation.  We will continue current therapy with flecainide and metoprolol.   2.  Embolic prevention.  CHADS-VASc score of 2.  Continue Eliquis indefinitely.   3.  Hypertension.  Blood pressure is well controlled.   4.  Followup care.  Follow up in clinic in 6-12 months.  I also recommend a stress echo to be done this year while on flecainide.         MERE FARFAN MD             D: 02/13/2018   T: 02/13/2018    MT: ALISHA      Name:     ISIAH MERINO   MRN:      [id number]        Account:      [id number]   :      1945           Service Date: 2018      Document: [id number]         Outpatient Encounter Prescriptions as of 2018   Medication Sig Dispense Refill     apixaban ANTICOAGULANT (ELIQUIS) 5 MG tablet Take 1 tablet (5 mg) by mouth 2 times daily 180 tablet 1     flecainide (TAMBOCOR) 100 MG tablet Take 1 tablet (100 mg) by mouth 2 times daily 60 tablet 3     metoprolol succinate (TOPROL XL) 25 MG 24 hr tablet Take 1 tablet (25 mg) by mouth daily 30 tablet 3     lisinopril (PRINIVIL/ZESTRIL) 5 MG tablet Take 1 tablet (5 mg) by mouth daily 30 tablet 3     nitroglycerin (NITROSTAT) 0.4 MG sublingual tablet For chest pain place 1 tablet under the tongue every 5 minutes for 3 doses. If symptoms persist 5 minutes after 1st dose call 911. 25 tablet 0     Elastic Bandages & Supports (JOBST FOR MEN KNEE HIGH/LG) MISC 1 each daily Use it on the right leg as instructed 1 each 1     sildenafil (VIAGRA) 50 MG tablet Take 0.5-1 tablets (25-50 mg) by mouth daily as needed for erectile dysfunction Take 30 min to 4 hours before intercourse.  Never use with nitroglycerin, terazosin or doxazosin. 36 tablet 0     ORDER FOR DME Equipment being ordered: heel lift- MD 1 Device 0     Elastic Bandage MISC 1 each daily. Use it on the right leg as instructed. 1 each 1     STATIN NOT PRESCRIBED, INTENTIONAL, by Other route continuous prn.  0     IRON 325 (65 FE) MG OR TABS 1 TABLET DAILY       [DISCONTINUED] lisinopril (PRINIVIL/ZESTRIL) 5 MG tablet Take 5 mg by mouth daily       [DISCONTINUED] flecainide (TAMBOCOR) 100 MG tablet Take 1 tablet (100 mg) by mouth 2 times daily 60 tablet 3     [DISCONTINUED] metoprolol (TOPROL XL) 25 MG 24 hr tablet Take 1 tablet (25 mg) by mouth daily 30 tablet 3     [DISCONTINUED] apixaban ANTICOAGULANT (ELIQUIS) 5 MG tablet Take 1 tablet (5 mg) by mouth 2 times daily 180 tablet 1     No  facility-administered encounter medications on file as of 2/13/2018.        Again, thank you for allowing me to participate in the care of your patient.      Sincerely,    Sreekanth Aguayo MD     [facility]

## 2018-02-13 NOTE — MR AVS SNAPSHOT
After Visit Summary   2/13/2018    Charlene Gutierrez    MRN: [id number]           Patient Information     Date Of Birth          1945        Visit Information        Provider Department      2/13/2018 8:15 AM Sreekanth Aguayo MD [facility]        Today's Diagnoses     Persistent atrial fibrillation (H)    -  1    Atrial fibrillation, unspecified type (H)        Benign essential hypertension           Follow-ups after your visit        Future tests that were ordered for you today     Open Future Orders        Priority Expected Expires Ordered    Exercise Stress Echocardiogram Routine 2/20/2018 2/13/2019 2/13/2018            Who to contact     If you have questions or need follow up information about today's clinic visit or your schedule please contact [facility] directly at [phone].  Normal or non-critical lab and imaging results will be communicated to you by MyChart, letter or phone within 4 business days after the clinic has received the results. If you do not hear from us within 7 days, please contact the clinic through MyChart or phone. If you have a critical or abnormal lab result, we will notify you by phone as soon as possible.  Submit refill requests through [facility] or call your pharmacy and they will forward the refill request to us. Please allow 3 business days for your refill to be completed.          Additional Information About Your Visit        MyChart Information     [facility] gives you secure access to your electronic health record. If you see a primary care provider, you can also send messages to your care team and make appointments. If you have questions, please call your primary care clinic.  If you do not have a primary care provider, please call [phone] and they will assist you.        Care EveryWhere ID     This is your Care EveryWhere ID. This could be used by other organizations to  "access your [facility] medical records  [id number]        Your Vitals Were     Pulse Height BMI (Body Mass Index)             62 1.651 m (5' 5\") 28.62 kg/m2          Blood Pressure from Last 3 Encounters:   02/13/18 132/80   12/05/17 122/82   11/27/17 104/64    Weight from Last 3 Encounters:   02/13/18 78 kg (172 lb)   12/05/17 76.9 kg (169 lb 8 oz)   11/27/17 76.7 kg (169 lb)              We Performed the Following     EKG 12-lead complete w/read - Clinics     Follow-Up with Electrophysiologist          Where to get your medicines      These medications were sent to [facility] - [address] [address]     Phone:  [phone]     apixaban ANTICOAGULANT 5 MG tablet    flecainide 100 MG tablet    lisinopril 5 MG tablet    metoprolol succinate 25 MG 24 hr tablet          Primary Care Provider Office Phone # Fax #    Ann Aristides [phone]       [facility] [address]  [facility] [address]        Equal Access to Services     HOLLIE LUIS AH: Hadii aad ku hadasho Soomaali, waaxda luqadaha, qaybta kaalmada adeegyada, waxay idiin hayaan hilary dumont. So [facility] [phone].    ATENCIÓN: Si habla español, tiene a dejesus disposición servicios gratuitos de asistencia lingüística. Rebeca al [phone].    We comply with applicable federal civil rights laws and [place] laws. We do not discriminate on the basis of race, color, national origin, age, disability, sex, sexual orientation, or gender identity.            Thank you!     Thank you for choosing [facility]  for your care. Our goal is always to provide you with excellent care. Hearing back from our patients is one way we can continue to improve our services. Please take a few minutes to complete the written survey that you may receive in the mail after your visit with us. Thank you!             Your Updated " Medication List - Protect others around you: Learn how to safely use, store and throw away your medicines at www.disposemymeds.org.          This list is accurate as of 2/13/18  8:42 AM.  Always use your most recent med list.                   Brand Name Dispense Instructions for use Diagnosis    apixaban ANTICOAGULANT 5 MG tablet    ELIQUIS    180 tablet    Take 1 tablet (5 mg) by mouth 2 times daily    Atrial fibrillation, unspecified type (H)       * Elastic Bandage Misc     1 each    1 each daily. Use it on the right leg as instructed.    Varicose veins       * JOBST FOR MEN KNEE HIGH/LG Misc     1 each    1 each daily Use it on the right leg as instructed    Varicose veins       flecainide 100 MG tablet    TAMBOCOR    60 tablet    Take 1 tablet (100 mg) by mouth 2 times daily    Persistent atrial fibrillation (H)       iron 325 (65 FE) MG tablet      1 TABLET DAILY        lisinopril 5 MG tablet    PRINIVIL/ZESTRIL    30 tablet    Take 1 tablet (5 mg) by mouth daily    Benign essential hypertension       metoprolol succinate 25 MG 24 hr tablet    TOPROL XL    30 tablet    Take 1 tablet (25 mg) by mouth daily    Persistent atrial fibrillation (H)       nitroGLYcerin 0.4 MG sublingual tablet    NITROSTAT    25 tablet    For chest pain place 1 tablet under the tongue every 5 minutes for 3 doses. If symptoms persist 5 minutes after 1st dose call 911.    Chest pressure       order for DME     1 Device    Equipment being ordered: heel lift- MD    Achilles tendinosis, right       sildenafil 50 MG tablet    VIAGRA    36 tablet    Take 0.5-1 tablets (25-50 mg) by mouth daily as needed for erectile dysfunction Take 30 min to 4 hours before intercourse.  Never use with nitroglycerin, terazosin or doxazosin.    Erectile dysfunction       STATIN NOT PRESCRIBED (INTENTIONAL)      by Other route continuous prn.    Hypertension goal BP (blood pressure) < 140/90, Hyperlipidemia LDL goal <130       * Notice:  This list has 2  medication(s) that are the same as other medications prescribed for you. Read the directions carefully, and ask your doctor or other care provider to review them with you.

## 2018-02-13 NOTE — PROGRESS NOTES
Service Date: 2018      REASON FOR VISIT:  Evaluation of persistent atrial fibrillation.      HISTORY OF PRESENT ILLNESS:  Mr. Gutierrez is a pleasant 72-year-old gentleman with history of hypertension, hyperlipidemia and recurrent persistent AFib, who failed therapy with flecainide and underwent pulmonary vein isolation 2017.  He is here today for routine followup.      He initially failed therapy with flecainide and metoprolol.  He underwent ablation 2017.  Post-procedure he had recurrence of AFib requiring cardioversion and a brief period of amiodarone.  Later in September we switched amio to flecainide.      At the moment, he is doing well.  He informs he has not had any recurrence of atrial fibrillation.  He denies any symptoms such as chest pain, shortness of breath, lightheadedness, near-syncope or syncopal episode.      EKG done here today showed normal sinus rhythm with QRS measuring 106 milliseconds, which is unchanged from baseline.  He had a nuclear stress test done in 2017, which was negative for ischemia and revealed normal cardiac function.  An echocardiogram obtained on 2017 was a RHETT and revealed severe dilated left atrium with an EF of 55%-60%.      ASSESSMENT AND PLAN:   1.  Recurrent persistent AFib.  Responded well to ablation.  We will continue current therapy with flecainide and metoprolol.   2.  Embolic prevention.  CHADS-VASc score of 2.  Continue Eliquis indefinitely.   3.  Hypertension.  Blood pressure is well controlled.   4.  Followup care.  Follow up in clinic in 6-12 months.  I also recommend a stress echo to be done this year while on flecainide.         MERE FARFAN MD             D: 2018   T: 2018   MT: ALISHA      Name:     ISIAH GUTIERREZ   MRN:      [id number]        Account:      [id number]   :      1945           Service Date: 2018      Document: [id number]

## 2018-02-13 NOTE — PROGRESS NOTES
"[id number]    Electrophysiology/ Clinic Note         H&P and Plan:            Sreekanth Aguayo MD    Physical Exam:  Vitals: /80  Pulse 62  Ht 1.651 m (5' 5\")  Wt 78 kg (172 lb)  BMI 28.62 kg/m2    Constitutional:  AAO x3.  Pt is in NAD.  HEAD: normocephalic.  SKIN: Skin normal color, texture and turgor with no lesions or eruptions.  Eyes: PERRL, EOMI.  ENT:  Supple, normal JVP. No lymphadenopathy or thyroid enlargement.  Chest:  CTAB.  Cardiac:   RRR, normal  S1 and S2.  No murmurs rubs or gallop.   Abdomen:  Normal BS.  Soft, non-tender and non-distended.  No rebound or guarding.    Extremities:  Pedious pulses palpable B/L.  No LE edema noticed.   Neurological: Strength and sensation grossly symmetric and intact throughout.       CURRENT MEDICATIONS:  Current Outpatient Prescriptions   Medication Sig Dispense Refill     apixaban ANTICOAGULANT (ELIQUIS) 5 MG tablet Take 1 tablet (5 mg) by mouth 2 times daily 180 tablet 1     flecainide (TAMBOCOR) 100 MG tablet Take 1 tablet (100 mg) by mouth 2 times daily 60 tablet 3     metoprolol succinate (TOPROL XL) 25 MG 24 hr tablet Take 1 tablet (25 mg) by mouth daily 30 tablet 3     lisinopril (PRINIVIL/ZESTRIL) 5 MG tablet Take 1 tablet (5 mg) by mouth daily 30 tablet 3     nitroglycerin (NITROSTAT) 0.4 MG sublingual tablet For chest pain place 1 tablet under the tongue every 5 minutes for 3 doses. If symptoms persist 5 minutes after 1st dose call 911. 25 tablet 0     Elastic Bandages & Supports (JOBST FOR MEN KNEE HIGH/LG) MISC 1 each daily Use it on the right leg as instructed 1 each 1     sildenafil (VIAGRA) 50 MG tablet Take 0.5-1 tablets (25-50 mg) by mouth daily as needed for erectile dysfunction Take 30 min to 4 hours before intercourse.  Never use with nitroglycerin, terazosin or doxazosin. 36 tablet 0     ORDER FOR DME Equipment being ordered: heel lift- MD 1 Device 0     Elastic Bandage MISC 1 each daily. Use it on the right leg as instructed. 1 each 1     " STATIN NOT PRESCRIBED, INTENTIONAL, by Other route continuous prn.  0     IRON 325 (65 FE) MG OR TABS 1 TABLET DAILY       [DISCONTINUED] lisinopril (PRINIVIL/ZESTRIL) 5 MG tablet Take 5 mg by mouth daily       [DISCONTINUED] flecainide (TAMBOCOR) 100 MG tablet Take 1 tablet (100 mg) by mouth 2 times daily 60 tablet 3     [DISCONTINUED] metoprolol (TOPROL XL) 25 MG 24 hr tablet Take 1 tablet (25 mg) by mouth daily 30 tablet 3       ALLERGIES   No Known Allergies    PAST MEDICAL HISTORY:  Past Medical History:   Diagnosis Date     CKD (chronic kidney disease) 2012     Gastric ulcer      HTN (hypertension), benign      Hyperlipidaemia      Paroxysmal atrial fibrillation (H) 11/17/2013       PAST SURGICAL HISTORY:  Past Surgical History:   Procedure Laterality Date     ANESTHESIA CARDIOVERSION N/A 6/5/2017    Procedure: ANESTHESIA CARDIOVERSION;  ANESTHESIA NEEDED FOR CARDIOVERSION IN CARE SUITES. (RHETT AT 0830);  Surgeon: GENERIC ANESTHESIA PROVIDER;  Location: [facility]     BRONCHOSCOPY       C INCISION OF PYLORIC MUSCLE       CARDIOVERSION  11/2013, 8/22/17     COLONOSCOPY       EXTRACAPSULAR CATARACT EXTRATION WITH INTRAOCULAR LENS IMPLANT  2003    right eye     H ABLATION FOCAL AFIB  07/06/2017       FAMILY HISTORY:  Family History   Problem Relation Age of Onset     CANCER Mother      CANCER Father      Colon Cancer Father      Prostate Cancer No family hx of        SOCIAL HISTORY:  Social History     Social History     Marital status:      Spouse name: N/A     Number of children: N/A     Years of education: N/A     Social History Main Topics     Smoking status: Former Smoker     Years: 38.00     Types: Cigarettes     Quit date: 8/1/2012     Smokeless tobacco: Never Used     Alcohol use Yes      Comment: ocass     Drug use: No     Sexual activity: Yes     Partners: Female     Other Topics Concern     Parent/Sibling W/ Cabg, Mi Or Angioplasty Before 65f 55m? No      Service Yes     Blood Transfusions No      Caffeine Concern No     Occupational Exposure No     Hobby Hazards No     Sleep Concern No     Stress Concern No     Weight Concern No     Special Diet No     Back Care No     Exercise Yes     Bike Helmet No     Seat Belt Yes     Self-Exams No     Social History Narrative       Review of Systems:  Skin:  Negative     Eyes:  Positive for glasses  ENT:  Negative    Respiratory:  Negative for shortness of breath;dyspnea on exertion;cough  Cardiovascular:  Negative for;palpitations;exercise intolerance;lightheadedness;dizziness;fatigue;chest pain;edema    Gastroenterology: Negative for melena;hematochezia  Genitourinary:  Negative    Musculoskeletal:  Negative    Neurologic:  Negative    Psychiatric:  Negative    Heme/Lymph/Imm:  Negative    Endocrine:  Negative        Recent Lab Results:  LIPID RESULTS:  Lab Results   Component Value Date    CHOL 188 03/13/2017    HDL 46 03/13/2017     03/13/2017    TRIG 112 03/13/2017    CHOLHDLRATIO 4.1 03/13/2017    CHOLHDLRATIO 4.2 05/28/2015       LIVER ENZYME RESULTS:  Lab Results   Component Value Date    AST 25 08/17/2017    ALT 45 08/17/2017       CBC RESULTS:  Lab Results   Component Value Date    WBC 5.4 07/06/2017    RBC 4.11 (L) 07/06/2017    HGB 12.9 (L) 07/06/2017    HCT 37.2 (L) 07/06/2017    MCV 91 07/06/2017    MCH 31.4 07/06/2017    MCHC 34.7 07/06/2017    RDW 12.4 07/06/2017     07/06/2017       BMP RESULTS:  Lab Results   Component Value Date     07/06/2017    POTASSIUM 4.3 08/22/2017    CHLORIDE 109 07/06/2017    CO2 22 07/06/2017    ANIONGAP 8 07/06/2017    GLC 95 07/06/2017    BUN 17 07/06/2017    CR 0.94 07/06/2017    GFRESTIMATED 80 07/06/2017    GFRESTBLACK >90   GFR Calc   07/06/2017    CHUCKY 8.6 07/06/2017        A1C RESULTS:  Lab Results   Component Value Date    A1C 5.5 03/13/2017       INR RESULTS:  Lab Results   Component Value Date    INR 1.13 07/06/2017    INR 1.18 (H) 06/05/2017         ECHOCARDIOGRAM  No results  found for this or any previous visit (from the past 8760 hour(s)).      Orders Placed This Encounter   Procedures     Exercise Stress Echocardiogram     Orders Placed This Encounter   Medications     apixaban ANTICOAGULANT (ELIQUIS) 5 MG tablet     Sig: Take 1 tablet (5 mg) by mouth 2 times daily     Dispense:  180 tablet     Refill:  1     flecainide (TAMBOCOR) 100 MG tablet     Sig: Take 1 tablet (100 mg) by mouth 2 times daily     Dispense:  60 tablet     Refill:  3     metoprolol succinate (TOPROL XL) 25 MG 24 hr tablet     Sig: Take 1 tablet (25 mg) by mouth daily     Dispense:  30 tablet     Refill:  3     lisinopril (PRINIVIL/ZESTRIL) 5 MG tablet     Sig: Take 1 tablet (5 mg) by mouth daily     Dispense:  30 tablet     Refill:  3     Medications Discontinued During This Encounter   Medication Reason     apixaban ANTICOAGULANT (ELIQUIS) 5 MG tablet Reorder     flecainide (TAMBOCOR) 100 MG tablet Reorder     metoprolol (TOPROL XL) 25 MG 24 hr tablet Reorder     lisinopril (PRINIVIL/ZESTRIL) 5 MG tablet Reorder         Encounter Diagnoses   Name Primary?     Persistent atrial fibrillation (H) Yes     Atrial fibrillation, unspecified type (H)      Benign essential hypertension          GILBERTO Whiteside-MAO  [address]  [place] [address]

## 2018-02-27 ENCOUNTER — HOSPITAL ENCOUNTER (OUTPATIENT)
Dept: CARDIOLOGY | Facility: CLINIC | Age: 73
Discharge: HOME | End: 2018-02-27
Attending: INTERNAL MEDICINE | Admitting: INTERNAL MEDICINE
Payer: COMMERCIAL

## 2018-02-27 DIAGNOSIS — I48.19 PERSISTENT ATRIAL FIBRILLATION (H): ICD-10-CM

## 2018-02-27 PROCEDURE — 93018 CV STRESS TEST I&R ONLY: CPT | Performed by: INTERNAL MEDICINE

## 2018-02-27 PROCEDURE — 93016 CV STRESS TEST SUPVJ ONLY: CPT | Performed by: INTERNAL MEDICINE

## 2018-02-27 PROCEDURE — 93325 DOPPLER ECHO COLOR FLOW MAPG: CPT | Mod: TC

## 2018-02-27 PROCEDURE — 93325 DOPPLER ECHO COLOR FLOW MAPG: CPT | Mod: 26 | Performed by: INTERNAL MEDICINE

## 2018-02-27 PROCEDURE — 93321 DOPPLER ECHO F-UP/LMTD STD: CPT | Mod: 26 | Performed by: INTERNAL MEDICINE

## 2018-02-27 PROCEDURE — 93350 STRESS TTE ONLY: CPT | Mod: 26 | Performed by: INTERNAL MEDICINE

## 2018-02-28 ENCOUNTER — TELEPHONE (OUTPATIENT)
Dept: CARDIOLOGY | Facility: CLINIC | Age: 73
End: 2018-02-28

## 2018-02-28 NOTE — TELEPHONE ENCOUNTER
Pt called and states that he has not received his Eliquis from [facility] and has called them, but will be out before they arrive.  Two boxes of Eliquis for pt to . [facility]

## 2018-05-29 ENCOUNTER — DOCUMENTATION ONLY (OUTPATIENT)
Dept: CARDIOLOGY | Facility: CLINIC | Age: 73
End: 2018-05-29

## 2018-05-29 DIAGNOSIS — I48.91 ATRIAL FIBRILLATION (H): Primary | ICD-10-CM

## 2018-05-29 DIAGNOSIS — I48.19 PERSISTENT ATRIAL FIBRILLATION (H): ICD-10-CM

## 2018-05-29 DIAGNOSIS — I10 BENIGN ESSENTIAL HYPERTENSION: ICD-10-CM

## 2018-05-29 RX ORDER — LISINOPRIL 5 MG/1
5 TABLET ORAL DAILY
Qty: 90 TABLET | Refills: 1 | Status: SHIPPED | OUTPATIENT
Start: 2018-05-29 | End: 2018-11-05

## 2018-05-29 RX ORDER — FLECAINIDE ACETATE 100 MG/1
100 TABLET ORAL 2 TIMES DAILY
Qty: 180 TABLET | Refills: 1 | Status: SHIPPED | OUTPATIENT
Start: 2018-05-29 | End: 2018-11-05

## 2018-05-29 RX ORDER — METOPROLOL SUCCINATE 25 MG/1
25 TABLET, EXTENDED RELEASE ORAL DAILY
Qty: 90 TABLET | Refills: 1 | Status: SHIPPED | OUTPATIENT
Start: 2018-05-29 | End: 2018-11-05

## 2018-08-21 DIAGNOSIS — I48.91 ATRIAL FIBRILLATION, UNSPECIFIED TYPE (H): ICD-10-CM

## 2018-09-06 ENCOUNTER — OFFICE VISIT (OUTPATIENT)
Dept: CARDIOLOGY | Facility: CLINIC | Age: 73
End: 2018-09-06
Attending: INTERNAL MEDICINE
Payer: COMMERCIAL

## 2018-09-06 VITALS
HEIGHT: 65 IN | BODY MASS INDEX: 28.32 KG/M2 | HEART RATE: 64 BPM | WEIGHT: 170 LBS | SYSTOLIC BLOOD PRESSURE: 122 MMHG | DIASTOLIC BLOOD PRESSURE: 74 MMHG

## 2018-09-06 DIAGNOSIS — I48.19 PERSISTENT ATRIAL FIBRILLATION (H): Primary | ICD-10-CM

## 2018-09-06 PROCEDURE — 99214 OFFICE O/P EST MOD 30 MIN: CPT | Mod: 25 | Performed by: INTERNAL MEDICINE

## 2018-09-06 PROCEDURE — 93000 ELECTROCARDIOGRAM COMPLETE: CPT | Performed by: INTERNAL MEDICINE

## 2018-09-06 NOTE — PROGRESS NOTES
"[id number]    Electrophysiology/ Clinic Note         H&P and Plan:         Sreekanth Aguayo MD    Physical Exam:  Vitals: /74  Pulse 64  Ht 1.651 m (5' 5\")  Wt 77.1 kg (170 lb)  BMI 28.29 kg/m2    Constitutional:  AAO x3.  Pt is in NAD.  HEAD: normocephalic.  SKIN: Skin normal color, texture and turgor with no lesions or eruptions.  Eyes: PERRL, EOMI.  ENT:  Supple, normal JVP. No lymphadenopathy or thyroid enlargement.  Chest:  CTAB.  Cardiac: RRR, normal  S1 and S2.  No murmurs rubs or gallop.   Abdomen:  Normal BS.  Soft, non-tender and non-distended.  No rebound or guarding.    Extremities:  Pedious pulses palpable B/L.  No LE edema noticed.   Neurological: Strength and sensation grossly symmetric and intact throughout.       CURRENT MEDICATIONS:  Current Outpatient Prescriptions   Medication Sig Dispense Refill     apixaban ANTICOAGULANT (ELIQUIS) 5 MG tablet Take 1 tablet (5 mg) by mouth 2 times daily 180 tablet 1     flecainide (TAMBOCOR) 100 MG tablet Take 1 tablet (100 mg) by mouth 2 times daily 180 tablet 1     IRON 325 (65 FE) MG OR TABS 1 TABLET DAILY       lisinopril (PRINIVIL/ZESTRIL) 5 MG tablet Take 1 tablet (5 mg) by mouth daily 90 tablet 1     metoprolol succinate (TOPROL XL) 25 MG 24 hr tablet Take 1 tablet (25 mg) by mouth daily 90 tablet 1     nitroglycerin (NITROSTAT) 0.4 MG sublingual tablet For chest pain place 1 tablet under the tongue every 5 minutes for 3 doses. If symptoms persist 5 minutes after 1st dose call 911. 25 tablet 0     sildenafil (VIAGRA) 50 MG tablet Take 0.5-1 tablets (25-50 mg) by mouth daily as needed for erectile dysfunction Take 30 min to 4 hours before intercourse.  Never use with nitroglycerin, terazosin or doxazosin. 36 tablet 0     Elastic Bandage MISC 1 each daily. Use it on the right leg as instructed. 1 each 1     Elastic Bandages & Supports (JOBST FOR MEN KNEE HIGH/LG) MISC 1 each daily Use it on the right leg as instructed 1 each 1     ORDER FOR DME " Equipment being ordered: heel lift- MD 1 Device 0     STATIN NOT PRESCRIBED, INTENTIONAL, by Other route continuous prn.  0       ALLERGIES   No Known Allergies    PAST MEDICAL HISTORY:  Past Medical History:   Diagnosis Date     CKD (chronic kidney disease) 2012     Gastric ulcer      HTN (hypertension), benign      Hyperlipidaemia      Paroxysmal atrial fibrillation (H) 11/17/2013       PAST SURGICAL HISTORY:  Past Surgical History:   Procedure Laterality Date     ANESTHESIA CARDIOVERSION N/A 6/5/2017    Procedure: ANESTHESIA CARDIOVERSION;  ANESTHESIA NEEDED FOR CARDIOVERSION IN CARE SUITES. (RHETT AT 0830);  Surgeon: GENERIC ANESTHESIA PROVIDER;  Location: [facility]     BRONCHOSCOPY       C INCISION OF PYLORIC MUSCLE       CARDIOVERSION  11/2013, 8/22/17     COLONOSCOPY       EXTRACAPSULAR CATARACT EXTRATION WITH INTRAOCULAR LENS IMPLANT  2003    right eye     H ABLATION FOCAL AFIB  07/06/2017       FAMILY HISTORY:  Family History   Problem Relation Age of Onset     Cancer Mother      Cancer Father      Colon Cancer Father      Prostate Cancer No family hx of        SOCIAL HISTORY:  Social History     Social History     Marital status:      Spouse name: N/A     Number of children: N/A     Years of education: N/A     Social History Main Topics     Smoking status: Former Smoker     Years: 38.00     Types: Cigarettes     Quit date: 8/1/2012     Smokeless tobacco: Never Used     Alcohol use Yes      Comment: ocass     Drug use: No     Sexual activity: Yes     Partners: Female     Other Topics Concern     Parent/Sibling W/ Cabg, Mi Or Angioplasty Before 65f 55m? No      Service Yes     Blood Transfusions No     Caffeine Concern No     Occupational Exposure No     Hobby Hazards No     Sleep Concern No     Stress Concern No     Weight Concern No     Special Diet No     Back Care No     Exercise Yes     Bike Helmet No     Seat Belt Yes     Self-Exams No     Social History Narrative       Review of  Systems:  Skin:  Negative     Eyes:  Positive for glasses  ENT:  Negative    Respiratory:  Negative for shortness of breath;dyspnea on exertion;cough  Cardiovascular:  Negative for;exercise intolerance;lightheadedness;dizziness;fatigue;chest pain;edema palpitations;Positive for  Gastroenterology: Negative for melena;hematochezia  Genitourinary:  Negative    Musculoskeletal:  Negative    Neurologic:  Negative    Psychiatric:  Negative    Heme/Lymph/Imm:  Negative    Endocrine:  Negative        Recent Lab Results:  LIPID RESULTS:  Lab Results   Component Value Date    CHOL 188 03/13/2017    HDL 46 03/13/2017     03/13/2017    TRIG 112 03/13/2017    CHOLHDLRATIO 4.1 03/13/2017    CHOLHDLRATIO 4.2 05/28/2015       LIVER ENZYME RESULTS:  Lab Results   Component Value Date    AST 25 08/17/2017    ALT 45 08/17/2017       CBC RESULTS:  Lab Results   Component Value Date    WBC 5.4 07/06/2017    RBC 4.11 (L) 07/06/2017    HGB 12.9 (L) 07/06/2017    HCT 37.2 (L) 07/06/2017    MCV 91 07/06/2017    MCH 31.4 07/06/2017    MCHC 34.7 07/06/2017    RDW 12.4 07/06/2017     07/06/2017       BMP RESULTS:  Lab Results   Component Value Date     07/06/2017    POTASSIUM 4.3 08/22/2017    CHLORIDE 109 07/06/2017    CO2 22 07/06/2017    ANIONGAP 8 07/06/2017    GLC 95 07/06/2017    BUN 17 07/06/2017    CR 0.94 07/06/2017    GFRESTIMATED 80 07/06/2017    GFRESTBLACK >90   GFR Calc   07/06/2017    CHUCKY 8.6 07/06/2017        A1C RESULTS:  Lab Results   Component Value Date    A1C 5.5 03/13/2017       INR RESULTS:  Lab Results   Component Value Date    INR 1.13 07/06/2017    INR 1.18 (H) 06/05/2017         ECHOCARDIOGRAM  No results found for this or any previous visit (from the past 8760 hour(s)).      Orders Placed This Encounter   Procedures     EKG 12-lead complete w/read - Clinics (performed today)     No orders of the defined types were placed in this encounter.    There are no discontinued  medications.      Encounter Diagnosis   Name Primary?     Atrial fibrillation (H)          CC  Sreekanth Aguayo MD  [address]  [place] [address]

## 2018-09-06 NOTE — LETTER
"9/6/2018    MIKE DEMPSEY  [facility] [address]  [facility] [address]    RE: Charlene Gutierrez       Dear Colleague,    I had the pleasure of seeing Charlene Gutierrez in the [facility].    [id number]    Electrophysiology/ Clinic Note         H&P and Plan:         Sreekanth Aguayo MD    Physical Exam:  Vitals: /74  Pulse 64  Ht 1.651 m (5' 5\")  Wt 77.1 kg (170 lb)  BMI 28.29 kg/m2    Constitutional:  AAO x3.  Pt is in NAD.  HEAD: normocephalic.  SKIN: Skin normal color, texture and turgor with no lesions or eruptions.  Eyes: PERRL, EOMI.  ENT:  Supple, normal JVP. No lymphadenopathy or thyroid enlargement.  Chest:  CTAB.  Cardiac: RRR, normal  S1 and S2.  No murmurs rubs or gallop.   Abdomen:  Normal BS.  Soft, non-tender and non-distended.  No rebound or guarding.    Extremities:  Pedious pulses palpable B/L.  No LE edema noticed.   Neurological: Strength and sensation grossly symmetric and intact throughout.       CURRENT MEDICATIONS:  Current Outpatient Prescriptions   Medication Sig Dispense Refill     apixaban ANTICOAGULANT (ELIQUIS) 5 MG tablet Take 1 tablet (5 mg) by mouth 2 times daily 180 tablet 1     flecainide (TAMBOCOR) 100 MG tablet Take 1 tablet (100 mg) by mouth 2 times daily 180 tablet 1     IRON 325 (65 FE) MG OR TABS 1 TABLET DAILY       lisinopril (PRINIVIL/ZESTRIL) 5 MG tablet Take 1 tablet (5 mg) by mouth daily 90 tablet 1     metoprolol succinate (TOPROL XL) 25 MG 24 hr tablet Take 1 tablet (25 mg) by mouth daily 90 tablet 1     nitroglycerin (NITROSTAT) 0.4 MG sublingual tablet For chest pain place 1 tablet under the tongue every 5 minutes for 3 doses. If symptoms persist 5 minutes after 1st dose call 911. 25 tablet 0     sildenafil (VIAGRA) 50 MG tablet Take 0.5-1 tablets (25-50 mg) by mouth daily as needed for erectile dysfunction Take 30 min to 4 hours before intercourse.  Never use with nitroglycerin, terazosin or doxazosin. 36 " tablet 0     Elastic Bandage MISC 1 each daily. Use it on the right leg as instructed. 1 each 1     Elastic Bandages & Supports (JOBST FOR MEN KNEE HIGH/LG) MISC 1 each daily Use it on the right leg as instructed 1 each 1     ORDER FOR DME Equipment being ordered: heel lift- MD 1 Device 0     STATIN NOT PRESCRIBED, INTENTIONAL, by Other route continuous prn.  0       ALLERGIES   No Known Allergies    PAST MEDICAL HISTORY:  Past Medical History:   Diagnosis Date     CKD (chronic kidney disease) 2012     Gastric ulcer      HTN (hypertension), benign      Hyperlipidaemia      Paroxysmal atrial fibrillation (H) 11/17/2013       PAST SURGICAL HISTORY:  Past Surgical History:   Procedure Laterality Date     ANESTHESIA CARDIOVERSION N/A 6/5/2017    Procedure: ANESTHESIA CARDIOVERSION;  ANESTHESIA NEEDED FOR CARDIOVERSION IN CARE SUITES. (RHETT AT 0830);  Surgeon: GENERIC ANESTHESIA PROVIDER;  Location: [facility] OR     BRONCHOSCOPY       C INCISION OF PYLORIC MUSCLE       CARDIOVERSION  11/2013, 8/22/17     COLONOSCOPY       EXTRACAPSULAR CATARACT EXTRATION WITH INTRAOCULAR LENS IMPLANT  2003    right eye     H ABLATION FOCAL AFIB  07/06/2017       FAMILY HISTORY:  Family History   Problem Relation Age of Onset     Cancer Mother      Cancer Father      Colon Cancer Father      Prostate Cancer No family hx of        SOCIAL HISTORY:  Social History     Social History     Marital status:      Spouse name: N/A     Number of children: N/A     Years of education: N/A     Social History Main Topics     Smoking status: Former Smoker     Years: 38.00     Types: Cigarettes     Quit date: 8/1/2012     Smokeless tobacco: Never Used     Alcohol use Yes      Comment: ocass     Drug use: No     Sexual activity: Yes     Partners: Female     Other Topics Concern     Parent/Sibling W/ Cabg, Mi Or Angioplasty Before 65f 55m? No      Service Yes     Blood Transfusions No     Caffeine Concern No     Occupational Exposure No     Hobby  Hazards No     Sleep Concern No     Stress Concern No     Weight Concern No     Special Diet No     Back Care No     Exercise Yes     Bike Helmet No     Seat Belt Yes     Self-Exams No     Social History Narrative       Review of Systems:  Skin:  Negative     Eyes:  Positive for glasses  ENT:  Negative    Respiratory:  Negative for shortness of breath;dyspnea on exertion;cough  Cardiovascular:  Negative for;exercise intolerance;lightheadedness;dizziness;fatigue;chest pain;edema palpitations;Positive for  Gastroenterology: Negative for melena;hematochezia  Genitourinary:  Negative    Musculoskeletal:  Negative    Neurologic:  Negative    Psychiatric:  Negative    Heme/Lymph/Imm:  Negative    Endocrine:  Negative        Recent Lab Results:  LIPID RESULTS:  Lab Results   Component Value Date    CHOL 188 03/13/2017    HDL 46 03/13/2017     03/13/2017    TRIG 112 03/13/2017    CHOLHDLRATIO 4.1 03/13/2017    CHOLHDLRATIO 4.2 05/28/2015       LIVER ENZYME RESULTS:  Lab Results   Component Value Date    AST 25 08/17/2017    ALT 45 08/17/2017       CBC RESULTS:  Lab Results   Component Value Date    WBC 5.4 07/06/2017    RBC 4.11 (L) 07/06/2017    HGB 12.9 (L) 07/06/2017    HCT 37.2 (L) 07/06/2017    MCV 91 07/06/2017    MCH 31.4 07/06/2017    MCHC 34.7 07/06/2017    RDW 12.4 07/06/2017     07/06/2017       BMP RESULTS:  Lab Results   Component Value Date     07/06/2017    POTASSIUM 4.3 08/22/2017    CHLORIDE 109 07/06/2017    CO2 22 07/06/2017    ANIONGAP 8 07/06/2017    GLC 95 07/06/2017    BUN 17 07/06/2017    CR 0.94 07/06/2017    GFRESTIMATED 80 07/06/2017    GFRESTBLACK >90   GFR Calc   07/06/2017    CHUCKY 8.6 07/06/2017        A1C RESULTS:  Lab Results   Component Value Date    A1C 5.5 03/13/2017       INR RESULTS:  Lab Results   Component Value Date    INR 1.13 07/06/2017    INR 1.18 (H) 06/05/2017         ECHOCARDIOGRAM  No results found for this or any previous visit (from the past  8760 hour(s)).      Orders Placed This Encounter   Procedures     EKG 12-lead complete w/read - Clinics (performed today)     No orders of the defined types were placed in this encounter.    There are no discontinued medications.      Encounter Diagnosis   Name Primary?     Atrial fibrillation (H)          CC  Sreekanth Aguayo MD  [address]  [place] [id number]                Thank you for allowing me to participate in the care of your patient.      Sincerely,     Sreekanth Aguayo MD     [facility]    cc:   Sreekanth Aguayo MD  [address]  [place] [address]

## 2018-09-06 NOTE — LETTER
9/6/2018      MIKE  [facility] [address]  [facility] [address]      RE: Isiah Gutierrez       Dear Colleague,    I had the pleasure of seeing Isiah Gutierrez in the [facility].    Service Date: 09/06/2018      REASON FOR VISIT:  Evaluation of persistent atrial fibrillation.      HISTORY OF PRESENT ILLNESS:  Mr. Gutierrez is a pleasant 80-year-old gentleman with history of hypertension, hyperlipidemia and recurrent persistent AFib who failed therapy with flecainide and underwent pulmonary vein isolation 07/06/2017.  He is here today for routine followup.      He failed therapy with flecainide and metoprolol.  He underwent ablation 07/06/2017.  Post-procedure, he experienced a recurrence of AFib requiring cardioversion and brief period of amiodarone.  In sept, antiarrhythmic was switched.  The amiodarone was discontinued, and he was started on flecainide.        At the moment, he is doing well.  He informs that on the amio, he rarely has any sensation of palpitations.  When it happens, they are brief.  He denies any symptoms such as chest pain, lightheadedness, near-syncope or syncopal episode.      EKG done here today showed normal sinus rhythm with QRS measuring 102 milliseconds, which is unchanged from previous EKG.  A stress echo obtained 02/22/2018 revealed normal cardiac function and ruled out ischemia or EKG abnormalities.      ASSESSMENT AND PLAN:   1.  Recurrent persistent AFib.  Responded well to ablation.  Will continue current therapy with metoprolol and flecainide.   2.  Embolic prevention.  CHADS-VASc score of 2.  Continue Eliquis indefinitely.   3.  Hypertension.  Blood pressure is well controlled.   4.  Followup care.  Follow up in clinic in a year or earlier as needed.         MERE FARFAN MD             D: 09/06/2018   T: 09/06/2018   MT: ALISHA      Name:     ISIAH GUTIERREZ   MRN:      [id number]        Account:       [id number]   :      1945           Service Date: 2018      Document: [id number]         Outpatient Encounter Prescriptions as of 2018   Medication Sig Dispense Refill     apixaban ANTICOAGULANT (ELIQUIS) 5 MG tablet Take 1 tablet (5 mg) by mouth 2 times daily 180 tablet 1     flecainide (TAMBOCOR) 100 MG tablet Take 1 tablet (100 mg) by mouth 2 times daily 180 tablet 1     IRON 325 (65 FE) MG OR TABS 1 TABLET DAILY       lisinopril (PRINIVIL/ZESTRIL) 5 MG tablet Take 1 tablet (5 mg) by mouth daily 90 tablet 1     metoprolol succinate (TOPROL XL) 25 MG 24 hr tablet Take 1 tablet (25 mg) by mouth daily 90 tablet 1     nitroglycerin (NITROSTAT) 0.4 MG sublingual tablet For chest pain place 1 tablet under the tongue every 5 minutes for 3 doses. If symptoms persist 5 minutes after 1st dose call 911. 25 tablet 0     sildenafil (VIAGRA) 50 MG tablet Take 0.5-1 tablets (25-50 mg) by mouth daily as needed for erectile dysfunction Take 30 min to 4 hours before intercourse.  Never use with nitroglycerin, terazosin or doxazosin. 36 tablet 0     Elastic Bandage MISC 1 each daily. Use it on the right leg as instructed. 1 each 1     Elastic Bandages & Supports (JOBST FOR MEN KNEE HIGH/LG) MISC 1 each daily Use it on the right leg as instructed 1 each 1     ORDER FOR DME Equipment being ordered: heel lift- MD 1 Device 0     STATIN NOT PRESCRIBED, INTENTIONAL, by Other route continuous prn.  0     No facility-administered encounter medications on file as of 2018.        Again, thank you for allowing me to participate in the care of your patient.      Sincerely,    Sreekanth Aguayo MD     [facility]

## 2018-09-07 NOTE — PROGRESS NOTES
Service Date: 2018      REASON FOR VISIT:  Evaluation of persistent atrial fibrillation.      HISTORY OF PRESENT ILLNESS:  Mr. Gutierrez is a pleasant 80-year-old gentleman with history of hypertension, hyperlipidemia and recurrent persistent AFib who failed therapy with flecainide and underwent pulmonary vein isolation 2017.  He is here today for routine followup.      He failed therapy with flecainide and metoprolol.  He underwent ablation 2017.  Post-procedure, he experienced recurrence of AFib requiring cardioversion and brief period of amiodarone.  In september, antiarrhythmic drug was switched. Amiodarone was discontinued, and he was started on flecainide.        At the moment, he is doing well.  He informs that on the amio, he rarely has any sensation of palpitations.  When it happens, they are brief.  He denies any symptoms such as chest pain, lightheadedness, near-syncope or syncopal episode.      EKG done here today showed normal sinus rhythm with QRS measuring 102 milliseconds, which is unchanged from previous EKG.  A stress echo obtained 2018 revealed normal cardiac function and ruled out ischemia or EKG abnormalities.      ASSESSMENT AND PLAN:   1.  Recurrent persistent AFib.  Responded well to ablation.  We will continue current therapy with metoprolol and flecainide.   2.  Embolic prevention.  CHADS-VASc score of 2.  Continue Eliquis indefinitely.   3.  Hypertension.  Blood pressure is well controlled.   4.  Followup care.  Follow up in clinic in a year or earlier as needed.         MERE FARFAN MD             D: 2018   T: 2018   MT: ALISHA      Name:     ISIAH GUTIERREZ   MRN:      [id number]        Account:      [id number]   :      1945           Service Date: 2018      Document: [id number]

## 2018-11-05 DIAGNOSIS — I48.19 PERSISTENT ATRIAL FIBRILLATION (H): ICD-10-CM

## 2018-11-05 DIAGNOSIS — I10 BENIGN ESSENTIAL HYPERTENSION: ICD-10-CM

## 2018-11-05 RX ORDER — FLECAINIDE ACETATE 100 MG/1
100 TABLET ORAL 2 TIMES DAILY
Qty: 180 TABLET | Refills: 1 | Status: SHIPPED | OUTPATIENT
Start: 2018-11-05 | End: 2019-05-31

## 2018-11-05 RX ORDER — LISINOPRIL 5 MG/1
5 TABLET ORAL DAILY
Qty: 90 TABLET | Refills: 1 | Status: SHIPPED | OUTPATIENT
Start: 2018-11-05 | End: 2019-05-31

## 2018-11-05 RX ORDER — METOPROLOL SUCCINATE 25 MG/1
25 TABLET, EXTENDED RELEASE ORAL DAILY
Qty: 90 TABLET | Refills: 1 | Status: SHIPPED | OUTPATIENT
Start: 2018-11-05 | End: 2019-05-31

## 2019-02-22 ENCOUNTER — DOCUMENTATION ONLY (OUTPATIENT)
Dept: CARDIOLOGY | Facility: CLINIC | Age: 74
End: 2019-02-22

## 2019-02-22 ENCOUNTER — TELEPHONE (OUTPATIENT)
Dept: CARDIOLOGY | Facility: CLINIC | Age: 74
End: 2019-02-22

## 2019-02-22 NOTE — TELEPHONE ENCOUNTER
Patient had called stating he was trying to change his pharmacy from my chart and was unable to, requesting the clinics help. The patient gave me the phone number of the pharmacy mail service. I was unable to locate the pharmacy with the phone number that was given.   Contacted patient for another phone number and/or an address of the correct mail service pharmacy. Patient states he will call back with the correct info

## 2019-02-28 DIAGNOSIS — I48.91 ATRIAL FIBRILLATION, UNSPECIFIED TYPE (H): ICD-10-CM

## 2019-05-31 DIAGNOSIS — I10 BENIGN ESSENTIAL HYPERTENSION: ICD-10-CM

## 2019-05-31 DIAGNOSIS — I48.19 PERSISTENT ATRIAL FIBRILLATION (H): ICD-10-CM

## 2019-05-31 RX ORDER — LISINOPRIL 5 MG/1
5 TABLET ORAL DAILY
Qty: 90 TABLET | Refills: 0 | Status: SHIPPED | OUTPATIENT
Start: 2019-05-31 | End: 2019-07-19

## 2019-05-31 RX ORDER — METOPROLOL SUCCINATE 25 MG/1
25 TABLET, EXTENDED RELEASE ORAL DAILY
Qty: 90 TABLET | Refills: 0 | Status: SHIPPED | OUTPATIENT
Start: 2019-05-31 | End: 2019-07-19

## 2019-05-31 RX ORDER — FLECAINIDE ACETATE 100 MG/1
100 TABLET ORAL 2 TIMES DAILY
Qty: 180 TABLET | Refills: 0 | Status: SHIPPED | OUTPATIENT
Start: 2019-05-31 | End: 2019-07-19

## 2019-07-19 DIAGNOSIS — I10 BENIGN ESSENTIAL HYPERTENSION: ICD-10-CM

## 2019-07-19 DIAGNOSIS — I48.19 PERSISTENT ATRIAL FIBRILLATION (H): Primary | ICD-10-CM

## 2019-07-19 RX ORDER — LISINOPRIL 5 MG/1
5 TABLET ORAL DAILY
Qty: 90 TABLET | Refills: 0 | Status: SHIPPED | OUTPATIENT
Start: 2019-07-19 | End: 2019-09-25

## 2019-07-19 RX ORDER — METOPROLOL SUCCINATE 25 MG/1
25 TABLET, EXTENDED RELEASE ORAL DAILY
Qty: 90 TABLET | Refills: 0 | Status: SHIPPED | OUTPATIENT
Start: 2019-07-19 | End: 2019-09-25

## 2019-07-19 RX ORDER — FLECAINIDE ACETATE 100 MG/1
100 TABLET ORAL 2 TIMES DAILY
Qty: 180 TABLET | Refills: 0 | Status: SHIPPED | OUTPATIENT
Start: 2019-07-19 | End: 2019-09-26

## 2019-09-25 DIAGNOSIS — I10 BENIGN ESSENTIAL HYPERTENSION: ICD-10-CM

## 2019-09-25 DIAGNOSIS — I48.19 PERSISTENT ATRIAL FIBRILLATION (H): ICD-10-CM

## 2019-09-25 RX ORDER — LISINOPRIL 5 MG/1
5 TABLET ORAL DAILY
Qty: 90 TABLET | Refills: 0 | Status: SHIPPED | OUTPATIENT
Start: 2019-09-25 | End: 2019-12-26

## 2019-09-25 RX ORDER — METOPROLOL SUCCINATE 25 MG/1
25 TABLET, EXTENDED RELEASE ORAL DAILY
Qty: 90 TABLET | Refills: 0 | Status: SHIPPED | OUTPATIENT
Start: 2019-09-25 | End: 2019-12-26

## 2019-09-26 DIAGNOSIS — I48.19 PERSISTENT ATRIAL FIBRILLATION (H): ICD-10-CM

## 2019-09-26 RX ORDER — FLECAINIDE ACETATE 100 MG/1
100 TABLET ORAL 2 TIMES DAILY
Qty: 180 TABLET | Refills: 0 | Status: SHIPPED | OUTPATIENT
Start: 2019-09-26 | End: 2019-12-26

## 2019-10-14 ENCOUNTER — OFFICE VISIT (OUTPATIENT)
Dept: CARDIOLOGY | Facility: CLINIC | Age: 74
End: 2019-10-14
Attending: INTERNAL MEDICINE
Payer: COMMERCIAL

## 2019-10-14 VITALS
HEIGHT: 65 IN | SYSTOLIC BLOOD PRESSURE: 132 MMHG | DIASTOLIC BLOOD PRESSURE: 80 MMHG | HEART RATE: 68 BPM | WEIGHT: 174.6 LBS | BODY MASS INDEX: 29.09 KG/M2

## 2019-10-14 DIAGNOSIS — I48.19 PERSISTENT ATRIAL FIBRILLATION (H): ICD-10-CM

## 2019-10-14 PROCEDURE — 93000 ELECTROCARDIOGRAM COMPLETE: CPT | Performed by: INTERNAL MEDICINE

## 2019-10-14 PROCEDURE — 99214 OFFICE O/P EST MOD 30 MIN: CPT | Mod: 25 | Performed by: INTERNAL MEDICINE

## 2019-10-14 ASSESSMENT — MIFFLIN-ST. JEOR: SCORE: 1468.86

## 2019-10-14 NOTE — LETTER
"10/14/2019    MIKE DEMPSEY  [facility] [address]  [facility] [address]    RE: Charlene Gutierrez       Dear Colleague,    I had the pleasure of seeing Charlene Gutierrez in the [facility].    Electrophysiology/ Clinic Note         H&P and Plan:     REASON FOR VISIT:  Evaluation of persistent atrial fibrillation.      HISTORY OF PRESENT ILLNESS:  Mr. Gutierrez is a pleasant 74-year-old gentleman with history of hypertension, hyperlipidemia and recurrent persistent AFib who failed therapy with flecainide and underwent pulmonary vein isolation 07/06/2017.  He is here today for routine followup.     He continues to do well.  After ablation he had a recurrence of A. fib and flecainide was resumed.  He informs in the last year he has not had any recurrence of atrial fibrillation.  Today he denies any symptoms such as chest pain, lightheadedness, near-syncope or syncopal episode.      EKG done here today showed normal sinus rhythm with QRS measuring 109 milliseconds, which is unchanged from previous EKG.  A stress echo obtained 02/22/2018 revealed normal cardiac function and ruled out ischemia or EKG abnormalities.      ASSESSMENT AND PLAN:   1.  Recurrent persistent AFib.     He continues to do well and is tolerating medications well.  Continue therapy with flecainide and metoprolol.    2.  Embolic prevention.  CHADS-VASc score of 2.  Continue Eliquis indefinitely.   3.  Hypertension.  Blood pressure is well controlled.   4.  Followup care.  Follow up in clinic in a year or earlier as needed.       Sreekanth Aguayo MD    Physical Exam:  Vitals: /80   Pulse 68   Ht 1.651 m (5' 5\")   Wt 79.2 kg (174 lb 9.6 oz)   BMI 29.05 kg/m       Constitutional:  AAO x3.  Pt is in NAD.  HEAD: normocephalic.  SKIN: Skin normal color, texture and turgor with no lesions or eruptions.  Eyes: PERRL, EOMI.  ENT:  Supple, normal JVP. No lymphadenopathy or thyroid enlargement.  Chest:  " CTAB.  Cardiac: RRR, normal  S1 and S2.  No murmurs rubs or gallop.   Abdomen:  Normal BS.  Soft, non-tender and non-distended.  No rebound or guarding.    Extremities:  Pedious pulses palpable B/L.  No LE edema noticed.   Neurological: Strength and sensation grossly symmetric and intact throughout.       CURRENT MEDICATIONS:  Current Outpatient Medications   Medication Sig Dispense Refill     apixaban ANTICOAGULANT (ELIQUIS) 5 MG tablet Take 1 tablet (5 mg) by mouth 2 times daily 180 tablet 2     Elastic Bandage MISC 1 each daily. Use it on the right leg as instructed. 1 each 1     Elastic Bandages & Supports (JOBST FOR MEN KNEE HIGH/LG) MISC 1 each daily Use it on the right leg as instructed 1 each 1     flecainide (TAMBOCOR) 100 MG tablet Take 1 tablet (100 mg) by mouth 2 times daily 180 tablet 0     IRON 325 (65 FE) MG OR TABS 1 TABLET DAILY       lisinopril (PRINIVIL/ZESTRIL) 5 MG tablet Take 1 tablet (5 mg) by mouth daily 90 tablet 0     metoprolol succinate ER (TOPROL XL) 25 MG 24 hr tablet Take 1 tablet (25 mg) by mouth daily 90 tablet 0     nitroglycerin (NITROSTAT) 0.4 MG sublingual tablet For chest pain place 1 tablet under the tongue every 5 minutes for 3 doses. If symptoms persist 5 minutes after 1st dose call 911. 25 tablet 0     ORDER FOR DME Equipment being ordered: heel lift- MD 1 Device 0     sildenafil (VIAGRA) 50 MG tablet Take 0.5-1 tablets (25-50 mg) by mouth daily as needed for erectile dysfunction Take 30 min to 4 hours before intercourse.  Never use with nitroglycerin, terazosin or doxazosin. 36 tablet 0     STATIN NOT PRESCRIBED, INTENTIONAL, by Other route continuous prn.  0       ALLERGIES   No Known Allergies    PAST MEDICAL HISTORY:  Past Medical History:   Diagnosis Date     CKD (chronic kidney disease) 2012     Gastric ulcer      HTN (hypertension), benign      Hyperlipidaemia      Paroxysmal atrial fibrillation (H) 11/17/2013       PAST SURGICAL HISTORY:  Past Surgical History:    Procedure Laterality Date     ANESTHESIA CARDIOVERSION N/A 2017    Procedure: ANESTHESIA CARDIOVERSION;  ANESTHESIA NEEDED FOR CARDIOVERSION IN CARE SUITES. (RHETT AT 0830);  Surgeon: GENERIC ANESTHESIA PROVIDER;  Location: [facility] OR     BRONCHOSCOPY       C INCISION OF PYLORIC MUSCLE       CARDIOVERSION  2013, 17     COLONOSCOPY       EXTRACAPSULAR CATARACT EXTRATION WITH INTRAOCULAR LENS IMPLANT      right eye     H ABLATION FOCAL AFIB  2017       FAMILY HISTORY:  Family History   Problem Relation Age of Onset     Cancer Mother      Cancer Father      Colon Cancer Father      Prostate Cancer No family hx of        SOCIAL HISTORY:  Social History     Socioeconomic History     Marital status:      Spouse name: None     Number of children: None     Years of education: None     Highest education level: None   Occupational History     None   Social Needs     Financial resource strain: None     Food insecurity:     Worry: None     Inability: None     Transportation needs:     Medical: None     Non-medical: None   Tobacco Use     Smoking status: Former Smoker     Years: 38.00     Types: Cigarettes     Last attempt to quit: 2012     Years since quittin.2     Smokeless tobacco: Never Used   Substance and Sexual Activity     Alcohol use: Yes     Comment: ocass     Drug use: No     Sexual activity: Yes     Partners: Female   Lifestyle     Physical activity:     Days per week: None     Minutes per session: None     Stress: None   Relationships     Social connections:     Talks on phone: None     Gets together: None     Attends [employer] service: None     Active member of club or organization: None     Attends meetings of clubs or organizations: None     Relationship status: None     Intimate partner violence:     Fear of current or ex partner: None     Emotionally abused: None     Physically abused: None     Forced sexual activity: None   Other Topics Concern     Parent/sibling w/ CABG, MI or  angioplasty before 65F 55M? No      Service Yes     Blood Transfusions No     Caffeine Concern No     Occupational Exposure No     Hobby Hazards No     Sleep Concern No     Stress Concern No     Weight Concern No     Special Diet No     Back Care No     Exercise Yes     Bike Helmet No     Seat Belt Yes     Self-Exams No   Social History Narrative     None       Review of Systems:  Skin:  Negative     Eyes:  Positive for glasses  ENT:  Negative    Respiratory:  Negative    Cardiovascular:  Negative    Gastroenterology: Negative    Genitourinary:  Negative    Musculoskeletal:  Positive for arthritis;joint pain  Neurologic:  Negative    Psychiatric:  Negative    Heme/Lymph/Imm:  Negative    Endocrine:  Negative        Recent Lab Results:  LIPID RESULTS:  Lab Results   Component Value Date    CHOL 188 03/13/2017    HDL 46 03/13/2017     03/13/2017    TRIG 112 03/13/2017    CHOLHDLRATIO 4.1 03/13/2017    CHOLHDLRATIO 4.2 05/28/2015       LIVER ENZYME RESULTS:  Lab Results   Component Value Date    AST 25 08/17/2017    ALT 45 08/17/2017       CBC RESULTS:  Lab Results   Component Value Date    WBC 5.4 07/06/2017    RBC 4.11 (L) 07/06/2017    HGB 12.9 (L) 07/06/2017    HCT 37.2 (L) 07/06/2017    MCV 91 07/06/2017    MCH 31.4 07/06/2017    MCHC 34.7 07/06/2017    RDW 12.4 07/06/2017     07/06/2017       BMP RESULTS:  Lab Results   Component Value Date     07/06/2017    POTASSIUM 4.3 08/22/2017    CHLORIDE 109 07/06/2017    CO2 22 07/06/2017    ANIONGAP 8 07/06/2017    GLC 95 07/06/2017    BUN 17 07/06/2017    CR 0.94 07/06/2017    GFRESTIMATED 80 07/06/2017    GFRESTBLACK >90   GFR Calc   07/06/2017    CHUCKY 8.6 07/06/2017        A1C RESULTS:  Lab Results   Component Value Date    A1C 5.5 03/13/2017       INR RESULTS:  Lab Results   Component Value Date    INR 1.13 07/06/2017    INR 1.18 (H) 06/05/2017         ECHOCARDIOGRAM  No results found for this or any previous visit (from the  past 8760 hour(s)).      Orders Placed This Encounter   Procedures     EKG 12-lead complete w/read - Clinics     No orders of the defined types were placed in this encounter.    There are no discontinued medications.      Encounter Diagnosis   Name Primary?     Persistent atrial fibrillation          CC  Sreekanth Aguayo MD  [address]  [place] [address]                Thank you for allowing me to participate in the care of your patient.      Sincerely,     Sreekanth Aguayo MD     [facility]    cc:   Sreekanth Aguayo MD  [address]  [place] [address]

## 2019-10-14 NOTE — LETTER
"10/14/2019    MIKE DEMPSEY  [facility] [address]  [facility] [address]    RE: Charlene Gutierrez       Dear Colleague,    I had the pleasure of seeing Charlene Gutierrez in the [facility].    Electrophysiology/ Clinic Note         H&P and Plan:     REASON FOR VISIT:  Evaluation of persistent atrial fibrillation.      HISTORY OF PRESENT ILLNESS:  Mr. Gutierrez is a pleasant 74-year-old gentleman with history of hypertension, hyperlipidemia and recurrent persistent AFib who failed therapy with flecainide and underwent pulmonary vein isolation 07/06/2017.  He is here today for routine followup.     He continues to do well.  After ablation he had a recurrence of A. fib and flecainide was resumed.  He informs in the last year he has not had any recurrence of atrial fibrillation.  Today he denies any symptoms such as chest pain, lightheadedness, near-syncope or syncopal episode.      EKG done here today showed normal sinus rhythm with QRS measuring 109 milliseconds, which is unchanged from previous EKG.  A stress echo obtained 02/22/2018 revealed normal cardiac function and ruled out ischemia or EKG abnormalities.      ASSESSMENT AND PLAN:   1.  Recurrent persistent AFib.     He continues to do well and is tolerating medications well.  Continue therapy with flecainide and metoprolol.    2.  Embolic prevention.  CHADS-VASc score of 2.  Continue Eliquis indefinitely.   3.  Hypertension.  Blood pressure is well controlled.   4.  Followup care.  Follow up in clinic in a year or earlier as needed.       Sreekanth Aguayo MD    Physical Exam:  Vitals: /80   Pulse 68   Ht 1.651 m (5' 5\")   Wt 79.2 kg (174 lb 9.6 oz)   BMI 29.05 kg/m       Constitutional:  AAO x3.  Pt is in NAD.  HEAD: normocephalic.  SKIN: Skin normal color, texture and turgor with no lesions or eruptions.  Eyes: PERRL, EOMI.  ENT:  Supple, normal JVP. No lymphadenopathy or thyroid enlargement.  Chest:  " CTAB.  Cardiac: RRR, normal  S1 and S2.  No murmurs rubs or gallop.   Abdomen:  Normal BS.  Soft, non-tender and non-distended.  No rebound or guarding.    Extremities:  Pedious pulses palpable B/L.  No LE edema noticed.   Neurological: Strength and sensation grossly symmetric and intact throughout.       CURRENT MEDICATIONS:  Current Outpatient Medications   Medication Sig Dispense Refill     apixaban ANTICOAGULANT (ELIQUIS) 5 MG tablet Take 1 tablet (5 mg) by mouth 2 times daily 180 tablet 2     Elastic Bandage MISC 1 each daily. Use it on the right leg as instructed. 1 each 1     Elastic Bandages & Supports (JOBST FOR MEN KNEE HIGH/LG) MISC 1 each daily Use it on the right leg as instructed 1 each 1     flecainide (TAMBOCOR) 100 MG tablet Take 1 tablet (100 mg) by mouth 2 times daily 180 tablet 0     IRON 325 (65 FE) MG OR TABS 1 TABLET DAILY       lisinopril (PRINIVIL/ZESTRIL) 5 MG tablet Take 1 tablet (5 mg) by mouth daily 90 tablet 0     metoprolol succinate ER (TOPROL XL) 25 MG 24 hr tablet Take 1 tablet (25 mg) by mouth daily 90 tablet 0     nitroglycerin (NITROSTAT) 0.4 MG sublingual tablet For chest pain place 1 tablet under the tongue every 5 minutes for 3 doses. If symptoms persist 5 minutes after 1st dose call 911. 25 tablet 0     ORDER FOR DME Equipment being ordered: heel lift- MD 1 Device 0     sildenafil (VIAGRA) 50 MG tablet Take 0.5-1 tablets (25-50 mg) by mouth daily as needed for erectile dysfunction Take 30 min to 4 hours before intercourse.  Never use with nitroglycerin, terazosin or doxazosin. 36 tablet 0     STATIN NOT PRESCRIBED, INTENTIONAL, by Other route continuous prn.  0       ALLERGIES   No Known Allergies    PAST MEDICAL HISTORY:  Past Medical History:   Diagnosis Date     CKD (chronic kidney disease) 2012     Gastric ulcer      HTN (hypertension), benign      Hyperlipidaemia      Paroxysmal atrial fibrillation (H) 11/17/2013       PAST SURGICAL HISTORY:  Past Surgical History:    Procedure Laterality Date     ANESTHESIA CARDIOVERSION N/A 2017    Procedure: ANESTHESIA CARDIOVERSION;  ANESTHESIA NEEDED FOR CARDIOVERSION IN CARE SUITES. (RHETT AT 0830);  Surgeon: GENERIC ANESTHESIA PROVIDER;  Location: [facility] OR     BRONCHOSCOPY       C INCISION OF PYLORIC MUSCLE       CARDIOVERSION  2013, 17     COLONOSCOPY       EXTRACAPSULAR CATARACT EXTRATION WITH INTRAOCULAR LENS IMPLANT      right eye     H ABLATION FOCAL AFIB  2017       FAMILY HISTORY:  Family History   Problem Relation Age of Onset     Cancer Mother      Cancer Father      Colon Cancer Father      Prostate Cancer No family hx of        SOCIAL HISTORY:  Social History     Socioeconomic History     Marital status:      Spouse name: None     Number of children: None     Years of education: None     Highest education level: None   Occupational History     None   Social Needs     Financial resource strain: None     Food insecurity:     Worry: None     Inability: None     Transportation needs:     Medical: None     Non-medical: None   Tobacco Use     Smoking status: Former Smoker     Years: 38.00     Types: Cigarettes     Last attempt to quit: 2012     Years since quittin.2     Smokeless tobacco: Never Used   Substance and Sexual Activity     Alcohol use: Yes     Comment: ocass     Drug use: No     Sexual activity: Yes     Partners: Female   Lifestyle     Physical activity:     Days per week: None     Minutes per session: None     Stress: None   Relationships     Social connections:     Talks on phone: None     Gets together: None     Attends Yarsanism service: None     Active member of club or organization: None     Attends meetings of clubs or organizations: None     Relationship status: None     Intimate partner violence:     Fear of current or ex partner: None     Emotionally abused: None     Physically abused: None     Forced sexual activity: None   Other Topics Concern     Parent/sibling w/ CABG, MI or  angioplasty before 65F 55M? No      Service Yes     Blood Transfusions No     Caffeine Concern No     Occupational Exposure No     Hobby Hazards No     Sleep Concern No     Stress Concern No     Weight Concern No     Special Diet No     Back Care No     Exercise Yes     Bike Helmet No     Seat Belt Yes     Self-Exams No   Social History Narrative     None       Review of Systems:  Skin:  Negative     Eyes:  Positive for glasses  ENT:  Negative    Respiratory:  Negative    Cardiovascular:  Negative    Gastroenterology: Negative    Genitourinary:  Negative    Musculoskeletal:  Positive for arthritis;joint pain  Neurologic:  Negative    Psychiatric:  Negative    Heme/Lymph/Imm:  Negative    Endocrine:  Negative        Recent Lab Results:  LIPID RESULTS:  Lab Results   Component Value Date    CHOL 188 03/13/2017    HDL 46 03/13/2017     03/13/2017    TRIG 112 03/13/2017    CHOLHDLRATIO 4.1 03/13/2017    CHOLHDLRATIO 4.2 05/28/2015       LIVER ENZYME RESULTS:  Lab Results   Component Value Date    AST 25 08/17/2017    ALT 45 08/17/2017       CBC RESULTS:  Lab Results   Component Value Date    WBC 5.4 07/06/2017    RBC 4.11 (L) 07/06/2017    HGB 12.9 (L) 07/06/2017    HCT 37.2 (L) 07/06/2017    MCV 91 07/06/2017    MCH 31.4 07/06/2017    MCHC 34.7 07/06/2017    RDW 12.4 07/06/2017     07/06/2017       BMP RESULTS:  Lab Results   Component Value Date     07/06/2017    POTASSIUM 4.3 08/22/2017    CHLORIDE 109 07/06/2017    CO2 22 07/06/2017    ANIONGAP 8 07/06/2017    GLC 95 07/06/2017    BUN 17 07/06/2017    CR 0.94 07/06/2017    GFRESTIMATED 80 07/06/2017    GFRESTBLACK >90   GFR Calc   07/06/2017    CHUCKY 8.6 07/06/2017        A1C RESULTS:  Lab Results   Component Value Date    A1C 5.5 03/13/2017       INR RESULTS:  Lab Results   Component Value Date    INR 1.13 07/06/2017    INR 1.18 (H) 06/05/2017         ECHOCARDIOGRAM  No results found for this or any previous visit (from the  past 8760 hour(s)).      Orders Placed This Encounter   Procedures     EKG 12-lead complete w/read - Clinics     No orders of the defined types were placed in this encounter.    There are no discontinued medications.      Encounter Diagnosis   Name Primary?     Persistent atrial fibrillation          Thank you for allowing me to participate in the care of your patient.    Sincerely,     Sreekanth Aguayo MD     [facility]

## 2019-10-14 NOTE — PROGRESS NOTES
"Electrophysiology/ Clinic Note         H&P and Plan:     REASON FOR VISIT:  Evaluation of persistent atrial fibrillation.      HISTORY OF PRESENT ILLNESS:  Mr. Gutierrez is a pleasant 74-year-old gentleman with history of hypertension, hyperlipidemia and recurrent persistent AFib who failed therapy with flecainide and underwent pulmonary vein isolation 07/06/2017.  He is here today for routine followup.     He continues to do well.  After ablation he had a recurrence of A. fib and flecainide was resumed.  He informs in the last year he has not had any recurrence of atrial fibrillation.  Today he denies any symptoms such as chest pain, lightheadedness, near-syncope or syncopal episode.      EKG done here today showed normal sinus rhythm with QRS measuring 109 milliseconds, which is unchanged from previous EKG.  A stress echo obtained 02/22/2018 revealed normal cardiac function and ruled out ischemia or EKG abnormalities.      ASSESSMENT AND PLAN:   1.  Recurrent persistent AFib.    He continues to do well and is tolerating medications well.  Continue therapy with flecainide and metoprolol.    2.  Embolic prevention.  CHADS-VASc score of 2.  Continue Eliquis indefinitely.   3.  Hypertension.  Blood pressure is well controlled.   4.  Followup care.  Follow up in clinic in a year or earlier as needed.       Sreekanth Aguayo MD    Physical Exam:  Vitals: /80   Pulse 68   Ht 1.651 m (5' 5\")   Wt 79.2 kg (174 lb 9.6 oz)   BMI 29.05 kg/m      Constitutional:  AAO x3.  Pt is in NAD.  HEAD: normocephalic.  SKIN: Skin normal color, texture and turgor with no lesions or eruptions.  Eyes: PERRL, EOMI.  ENT:  Supple, normal JVP. No lymphadenopathy or thyroid enlargement.  Chest:  CTAB.  Cardiac: RRR, normal  S1 and S2.  No murmurs rubs or gallop.   Abdomen:  Normal BS.  Soft, non-tender and non-distended.  No rebound or guarding.    Extremities:  Pedious pulses palpable B/L.  No LE edema noticed.   Neurological: Strength " and sensation grossly symmetric and intact throughout.       CURRENT MEDICATIONS:  Current Outpatient Medications   Medication Sig Dispense Refill     apixaban ANTICOAGULANT (ELIQUIS) 5 MG tablet Take 1 tablet (5 mg) by mouth 2 times daily 180 tablet 2     Elastic Bandage MISC 1 each daily. Use it on the right leg as instructed. 1 each 1     Elastic Bandages & Supports (JOBST FOR MEN KNEE HIGH/LG) MISC 1 each daily Use it on the right leg as instructed 1 each 1     flecainide (TAMBOCOR) 100 MG tablet Take 1 tablet (100 mg) by mouth 2 times daily 180 tablet 0     IRON 325 (65 FE) MG OR TABS 1 TABLET DAILY       lisinopril (PRINIVIL/ZESTRIL) 5 MG tablet Take 1 tablet (5 mg) by mouth daily 90 tablet 0     metoprolol succinate ER (TOPROL XL) 25 MG 24 hr tablet Take 1 tablet (25 mg) by mouth daily 90 tablet 0     nitroglycerin (NITROSTAT) 0.4 MG sublingual tablet For chest pain place 1 tablet under the tongue every 5 minutes for 3 doses. If symptoms persist 5 minutes after 1st dose call 911. 25 tablet 0     ORDER FOR DME Equipment being ordered: heel lift- MD 1 Device 0     sildenafil (VIAGRA) 50 MG tablet Take 0.5-1 tablets (25-50 mg) by mouth daily as needed for erectile dysfunction Take 30 min to 4 hours before intercourse.  Never use with nitroglycerin, terazosin or doxazosin. 36 tablet 0     STATIN NOT PRESCRIBED, INTENTIONAL, by Other route continuous prn.  0       ALLERGIES   No Known Allergies    PAST MEDICAL HISTORY:  Past Medical History:   Diagnosis Date     CKD (chronic kidney disease) 2012     Gastric ulcer      HTN (hypertension), benign      Hyperlipidaemia      Paroxysmal atrial fibrillation (H) 11/17/2013       PAST SURGICAL HISTORY:  Past Surgical History:   Procedure Laterality Date     ANESTHESIA CARDIOVERSION N/A 6/5/2017    Procedure: ANESTHESIA CARDIOVERSION;  ANESTHESIA NEEDED FOR CARDIOVERSION IN CARE SUITES. (RHETT AT 0830);  Surgeon: GENERIC ANESTHESIA PROVIDER;  Location:  OR     BRONCHOSCOPY        C INCISION OF PYLORIC MUSCLE       CARDIOVERSION  2013, 17     COLONOSCOPY       EXTRACAPSULAR CATARACT EXTRATION WITH INTRAOCULAR LENS IMPLANT      right eye     H ABLATION FOCAL AFIB  2017       FAMILY HISTORY:  Family History   Problem Relation Age of Onset     Cancer Mother      Cancer Father      Colon Cancer Father      Prostate Cancer No family hx of        SOCIAL HISTORY:  Social History     Socioeconomic History     Marital status:      Spouse name: None     Number of children: None     Years of education: None     Highest education level: None   Occupational History     None   Social Needs     Financial resource strain: None     Food insecurity:     Worry: None     Inability: None     Transportation needs:     Medical: None     Non-medical: None   Tobacco Use     Smoking status: Former Smoker     Years: 38.00     Types: Cigarettes     Last attempt to quit: 2012     Years since quittin.2     Smokeless tobacco: Never Used   Substance and Sexual Activity     Alcohol use: Yes     Comment: ocass     Drug use: No     Sexual activity: Yes     Partners: Female   Lifestyle     Physical activity:     Days per week: None     Minutes per session: None     Stress: None   Relationships     Social connections:     Talks on phone: None     Gets together: None     Attends Synagogue service: None     Active member of club or organization: None     Attends meetings of clubs or organizations: None     Relationship status: None     Intimate partner violence:     Fear of current or ex partner: None     Emotionally abused: None     Physically abused: None     Forced sexual activity: None   Other Topics Concern     Parent/sibling w/ CABG, MI or angioplasty before 65F 55M? No      Service Yes     Blood Transfusions No     Caffeine Concern No     Occupational Exposure No     Hobby Hazards No     Sleep Concern No     Stress Concern No     Weight Concern No     Special Diet No     Back  Care No     Exercise Yes     Bike Helmet No     Seat Belt Yes     Self-Exams No   Social History Narrative     None       Review of Systems:  Skin:  Negative     Eyes:  Positive for glasses  ENT:  Negative    Respiratory:  Negative    Cardiovascular:  Negative    Gastroenterology: Negative    Genitourinary:  Negative    Musculoskeletal:  Positive for arthritis;joint pain  Neurologic:  Negative    Psychiatric:  Negative    Heme/Lymph/Imm:  Negative    Endocrine:  Negative        Recent Lab Results:  LIPID RESULTS:  Lab Results   Component Value Date    CHOL 188 03/13/2017    HDL 46 03/13/2017     03/13/2017    TRIG 112 03/13/2017    CHOLHDLRATIO 4.1 03/13/2017    CHOLHDLRATIO 4.2 05/28/2015       LIVER ENZYME RESULTS:  Lab Results   Component Value Date    AST 25 08/17/2017    ALT 45 08/17/2017       CBC RESULTS:  Lab Results   Component Value Date    WBC 5.4 07/06/2017    RBC 4.11 (L) 07/06/2017    HGB 12.9 (L) 07/06/2017    HCT 37.2 (L) 07/06/2017    MCV 91 07/06/2017    MCH 31.4 07/06/2017    MCHC 34.7 07/06/2017    RDW 12.4 07/06/2017     07/06/2017       BMP RESULTS:  Lab Results   Component Value Date     07/06/2017    POTASSIUM 4.3 08/22/2017    CHLORIDE 109 07/06/2017    CO2 22 07/06/2017    ANIONGAP 8 07/06/2017    GLC 95 07/06/2017    BUN 17 07/06/2017    CR 0.94 07/06/2017    GFRESTIMATED 80 07/06/2017    GFRESTBLACK >90   GFR Calc   07/06/2017    CHUCKY 8.6 07/06/2017        A1C RESULTS:  Lab Results   Component Value Date    A1C 5.5 03/13/2017       INR RESULTS:  Lab Results   Component Value Date    INR 1.13 07/06/2017    INR 1.18 (H) 06/05/2017         ECHOCARDIOGRAM  No results found for this or any previous visit (from the past 8760 hour(s)).      Orders Placed This Encounter   Procedures     EKG 12-lead complete w/read - Clinics     No orders of the defined types were placed in this encounter.    There are no discontinued medications.      Encounter Diagnosis   Name  Primary?     Persistent atrial fibrillation          CC  Sreekanth Aguayo MD  [id number]  [place] [id number]

## 2019-10-17 DIAGNOSIS — I48.91 ATRIAL FIBRILLATION, UNSPECIFIED TYPE (H): ICD-10-CM

## 2019-12-26 DIAGNOSIS — I10 BENIGN ESSENTIAL HYPERTENSION: ICD-10-CM

## 2019-12-26 DIAGNOSIS — I48.19 PERSISTENT ATRIAL FIBRILLATION (H): ICD-10-CM

## 2019-12-26 RX ORDER — LISINOPRIL 5 MG/1
5 TABLET ORAL DAILY
Qty: 90 TABLET | Refills: 3 | Status: SHIPPED | OUTPATIENT
Start: 2019-12-26

## 2019-12-26 RX ORDER — FLECAINIDE ACETATE 100 MG/1
100 TABLET ORAL 2 TIMES DAILY
Qty: 180 TABLET | Refills: 3 | Status: SHIPPED | OUTPATIENT
Start: 2019-12-26 | End: 2020-11-30

## 2019-12-26 RX ORDER — METOPROLOL SUCCINATE 25 MG/1
25 TABLET, EXTENDED RELEASE ORAL DAILY
Qty: 90 TABLET | Refills: 3 | Status: SHIPPED | OUTPATIENT
Start: 2019-12-26 | End: 2020-11-30

## 2020-01-11 NOTE — TELEPHONE ENCOUNTER
Pt dental office called asking about pt Eliquis and that pt is to have a tooth extraction and what needs to be done. Explained that d/t pt history the Eliquis can be held for 2-3 days prior and then should be restarted as soon as possible post procedure. Arvin    Subjective:       Patient ID: Clvoer Stiles is a 40 y.o. female.    Chief Complaint: Hospital Follow Up    Patient presents to clinic today with her  for hospital follow up. Patient was admitted 12.5.19 with TIA. She was seen in ER 12.27.19 for palpitations. She had angiogram 1.2.20 showing MCA stenosis. She was diagnosed with moyamoya per Neurology. She reports recent cough and post nasal drip, now has nonproductive cough. Reports chest and back pain with coughing. Denies SOB. Denies fever/chills. Reports was advised to maintain her systolic -160. She reports her losartan was decreased to 50 mg daily, but her BP is still below goal. Patient is otherwise without concerns today.      Review of Systems   Constitutional: Negative for chills, fatigue, fever and unexpected weight change.   HENT: Positive for congestion and postnasal drip.    Eyes: Negative for visual disturbance.   Respiratory: Positive for cough. Negative for shortness of breath.    Cardiovascular: Positive for chest pain.   Musculoskeletal: Positive for back pain. Negative for myalgias.   Neurological: Negative for headaches.       Objective:      Physical Exam   Constitutional: She is oriented to person, place, and time. She appears well-developed and well-nourished. No distress.   HENT:   Head: Normocephalic and atraumatic.   Eyes: Pupils are equal, round, and reactive to light. Conjunctivae and EOM are normal. No scleral icterus.   Cardiovascular: Normal rate and regular rhythm. Exam reveals no gallop and no friction rub.   No murmur heard.  Pulmonary/Chest: Effort normal and breath sounds normal.   Neurological: She is alert and oriented to person, place, and time. No cranial nerve deficit. Gait normal.   Psychiatric: She has a normal mood and affect.   Vitals reviewed.      Assessment:       1. Cough    2. Chest discomfort    3. Essential hypertension    4. Moyamoya    5. Middle cerebral artery stenosis, bilateral         Plan:     Problem List Items Addressed This Visit     Essential hypertension    Current Assessment & Plan     BP below goal (systolic 140-160) given recent diagnosis of moyamoya/MCA stenosis; losartan already decreased to 50 mg daily; advised to decrease HCTZ to 12.5 mg daily and monitor; return for reassessment next week         Relevant Medications    hydroCHLOROthiazide (HYDRODIURIL) 25 MG tablet    Middle cerebral artery stenosis, bilateral    Overview     Followed by Neurology/Neurosurgery         Moyamoya    Overview     Followed by Neurology, Dr. Reyes           Other Visit Diagnoses     Cough    -  Primary    Relevant Medications    guaiFENesin (MUCINEX) 600 mg 12 hr tablet    Other Relevant Orders    X-Ray Chest PA And Lateral (Completed)    Chest discomfort        Relevant Orders    X-Ray Chest PA And Lateral (Completed)      Advised that refill request for plavix routed to Neurology to let them determine if appropriate for her to continue. Patient expressed understanding.  Symptoms consistent with resolving URI with residual cough. Will obtain chest x-ray as patient reports chest/back pain with coughing. Advised to seek medical attention if worse/persistent. Patient expressed understanding.

## 2020-02-24 ENCOUNTER — HEALTH MAINTENANCE LETTER (OUTPATIENT)
Age: 75
End: 2020-02-24

## 2020-08-05 ENCOUNTER — TELEPHONE (OUTPATIENT)
Dept: CARDIOLOGY | Facility: CLINIC | Age: 75
End: 2020-08-05

## 2020-08-05 NOTE — TELEPHONE ENCOUNTER
Received call from [facility] requesting hold orders d/t upcoming procedure for tooth extraction.  Per periprocedural management of AC in AF patient.  Patient low risk CHADS-VASc score of 2 and procedure low risk.  Patient to hold eliquis for 3 days prior to procedure, no bridging needed and resume eliquis day after procedure.  REJI Garibay

## 2020-10-06 DIAGNOSIS — I48.91 ATRIAL FIBRILLATION, UNSPECIFIED TYPE (H): ICD-10-CM

## 2020-10-12 ENCOUNTER — TELEPHONE (OUTPATIENT)
Dept: CARDIOLOGY | Facility: CLINIC | Age: 75
End: 2020-10-12

## 2020-10-12 DIAGNOSIS — I48.0 PAROXYSMAL ATRIAL FIBRILLATION (H): Primary | ICD-10-CM

## 2020-11-03 DIAGNOSIS — Z11.59 ENCOUNTER FOR SCREENING FOR OTHER VIRAL DISEASES: Primary | ICD-10-CM

## 2020-11-16 DIAGNOSIS — I48.19 PERSISTENT ATRIAL FIBRILLATION (H): Primary | ICD-10-CM

## 2020-11-18 ENCOUNTER — OFFICE VISIT (OUTPATIENT)
Dept: CARDIOLOGY | Facility: CLINIC | Age: 75
End: 2020-11-18
Attending: INTERNAL MEDICINE
Payer: COMMERCIAL

## 2020-11-18 VITALS
WEIGHT: 172 LBS | OXYGEN SATURATION: 96 % | DIASTOLIC BLOOD PRESSURE: 70 MMHG | HEART RATE: 70 BPM | BODY MASS INDEX: 28.62 KG/M2 | SYSTOLIC BLOOD PRESSURE: 138 MMHG

## 2020-11-18 DIAGNOSIS — I48.19 PERSISTENT ATRIAL FIBRILLATION (H): ICD-10-CM

## 2020-11-18 DIAGNOSIS — I48.0 PAROXYSMAL ATRIAL FIBRILLATION (H): ICD-10-CM

## 2020-11-18 PROCEDURE — 99214 OFFICE O/P EST MOD 30 MIN: CPT | Mod: 25 | Performed by: INTERNAL MEDICINE

## 2020-11-18 PROCEDURE — 93000 ELECTROCARDIOGRAM COMPLETE: CPT | Performed by: INTERNAL MEDICINE

## 2020-11-18 RX ORDER — ATORVASTATIN CALCIUM 10 MG/1
10 TABLET, FILM COATED ORAL DAILY
Qty: 30 TABLET | Refills: 3 | Status: SHIPPED | OUTPATIENT
Start: 2020-11-18 | End: 2021-05-17

## 2020-11-18 NOTE — LETTER
11/18/2020    MIKE  [facility] [address]  [facility] [address]    RE: Charlene Gutierrez       Dear Colleague,    I had the pleasure of seeing Charlene Gutierrez in the [facility].    Electrophysiology/ Clinic Note         H&P and Plan:     REASON FOR VISIT: Electrophysiology evaluation.      HISTORY OF PRESENT ILLNESS: Mr. Gutierrez is a pleasant 75-year-old gentleman with history of hypertension, hyperlipidemia and recurrent persistent AFib (pulmonary vein isolation on 07/06/2017), who is here today for routine followup.     She underwent ablation 2017.  He did well after ablation.  He was very symptomatic before the ablation and decided to continue therapy with flecainide as he was concerned about recurrence of A. Fib.    They, he informs he is doing well.  He affirms he has had a few episodes of palpitations last year however the episodes were very brief and did not affect his lifestyle. He denies any symptoms such as chest pain, lightheadedness, near-syncope or syncopal episode.     Recent labs confirmed elevated cholesterol levels (total cholesterol 215 and HDL of 52).     EKG  today showed normal sinus rhythm with QRS measuring 108 milliseconds, which is unchanged from previous EKG.     PREVIOUS STUDIES:   -Stress echo (02/22/2018): Negative for ischemia.  Normal cardiac function and no EKG abnormalities.      ASSESSMENT AND PLAN:   1.  Recurrent persistent AFib.    He continues to do well and is tolerating medications well.  Continue therapy with flecainide and metoprolol.    2.  Embolic prevention.  CHADS-VASc score of 2.  Continue Eliquis indefinitely.   3.  Hypertension.  Blood pressure is well controlled.   4.  Hyperlipidemia.  He is cardiovascular risk in the next 10 years is around 27%.  I recommend start start statin.  He did not tolerate simvastatin in the past.  Recommend low Lipitor.  Follow-up with PCP to check levels.  5. Followup care.   Follow up in clinic in a year or earlier as needed.           Sreekanth Aguayo MD    Physical Exam:  Vitals: There were no vitals taken for this visit.    Constitutional:  AAO x3.  Pt is in NAD.  HEAD: normocephalic.  SKIN: Skin normal color, texture and turgor with no lesions or eruptions.  Eyes: PERRL, EOMI.  ENT:  Supple, normal JVP. No lymphadenopathy or thyroid enlargement.  Chest:  CTAB, decrease breath sound in base B/L. Rales,  Wheezing.  Cardiac:  IIR, variable sound of S1 and Normal S2.  RRR, normal  S1 and S2.  No murmurs rubs or gallop.  Systolic murmur in LLSB II/VI.  Abdomen:  Normal BS.  Soft, non-tender and non-distended.  No rebound or guarding.    Extremities:  Pedious pulses palpable B/L.  No LE edema noticed.   Neurological: Strength and sensation grossly symmetric and intact throughout.       CURRENT MEDICATIONS:  Current Outpatient Medications   Medication Sig Dispense Refill     apixaban ANTICOAGULANT (ELIQUIS ANTICOAGULANT) 5 MG tablet Take 1 tablet (5 mg) by mouth 2 times daily 180 tablet 0     Elastic Bandage MISC 1 each daily. Use it on the right leg as instructed. 1 each 1     Elastic Bandages & Supports (JOBST FOR MEN KNEE HIGH/LG) MISC 1 each daily Use it on the right leg as instructed 1 each 1     flecainide (TAMBOCOR) 100 MG tablet Take 1 tablet (100 mg) by mouth 2 times daily 180 tablet 3     IRON 325 (65 FE) MG OR TABS 1 TABLET DAILY       lisinopril (PRINIVIL/ZESTRIL) 5 MG tablet Take 1 tablet (5 mg) by mouth daily 90 tablet 3     metoprolol succinate ER (TOPROL XL) 25 MG 24 hr tablet Take 1 tablet (25 mg) by mouth daily 90 tablet 3     nitroglycerin (NITROSTAT) 0.4 MG sublingual tablet For chest pain place 1 tablet under the tongue every 5 minutes for 3 doses. If symptoms persist 5 minutes after 1st dose call 911. 25 tablet 0     ORDER FOR DME Equipment being ordered: heel lift- MD 1 Device 0     sildenafil (VIAGRA) 50 MG tablet Take 0.5-1 tablets (25-50 mg) by mouth daily as  needed for erectile dysfunction Take 30 min to 4 hours before intercourse.  Never use with nitroglycerin, terazosin or doxazosin. 36 tablet 0     STATIN NOT PRESCRIBED, INTENTIONAL, by Other route continuous prn.  0       ALLERGIES   No Known Allergies    PAST MEDICAL HISTORY:  Past Medical History:   Diagnosis Date     CKD (chronic kidney disease)      Gastric ulcer      HTN (hypertension), benign      Hyperlipidaemia      Paroxysmal atrial fibrillation (H) 2013       PAST SURGICAL HISTORY:  Past Surgical History:   Procedure Laterality Date     ANESTHESIA CARDIOVERSION N/A 2017    Procedure: ANESTHESIA CARDIOVERSION;  ANESTHESIA NEEDED FOR CARDIOVERSION IN CARE SUITES. (RHETT AT 0830);  Surgeon: GENERIC ANESTHESIA PROVIDER;  Location: [facility] OR     BRONCHOSCOPY       C INCISION OF PYLORIC MUSCLE       CARDIOVERSION  2013, 17     COLONOSCOPY       EXTRACAPSULAR CATARACT EXTRATION WITH INTRAOCULAR LENS IMPLANT      right eye     H ABLATION FOCAL AFIB  2017       FAMILY HISTORY:  Family History   Problem Relation Age of Onset     Cancer Mother      Cancer Father      Colon Cancer Father      Prostate Cancer No family hx of        SOCIAL HISTORY:  Social History     Socioeconomic History     Marital status:      Spouse name: Not on file     Number of children: Not on file     Years of education: Not on file     Highest education level: Not on file   Occupational History     Not on file   Social Needs     Financial resource strain: Not on file     Food insecurity     Worry: Not on file     Inability: Not on file     Transportation needs     Medical: Not on file     Non-medical: Not on file   Tobacco Use     Smoking status: Former Smoker     Years: 38.00     Types: Cigarettes     Quit date: 2012     Years since quittin.3     Smokeless tobacco: Never Used   Substance and Sexual Activity     Alcohol use: Yes     Comment: ocass     Drug use: No     Sexual activity: Yes     Partners:  Female   Lifestyle     Physical activity     Days per week: Not on file     Minutes per session: Not on file     Stress: Not on file   Relationships     Social connections     Talks on phone: Not on file     Gets together: Not on file     Attends Zoroastrian service: Not on file     Active member of club or organization: Not on file     Attends meetings of clubs or organizations: Not on file     Relationship status: Not on file     Intimate partner violence     Fear of current or ex partner: Not on file     Emotionally abused: Not on file     Physically abused: Not on file     Forced sexual activity: Not on file   Other Topics Concern     Parent/sibling w/ CABG, MI or angioplasty before 65F 55M? No      Service Yes     Blood Transfusions No     Caffeine Concern No     Occupational Exposure No     Hobby Hazards No     Sleep Concern No     Stress Concern No     Weight Concern No     Special Diet No     Back Care No     Exercise Yes     Bike Helmet No     Seat Belt Yes     Self-Exams No   Social History Narrative     Not on file       Review of Systems:  Skin:        Eyes:       ENT:       Respiratory:       Cardiovascular:       Gastroenterology:      Genitourinary:       Musculoskeletal:       Neurologic:       Psychiatric:       Heme/Lymph/Imm:       Endocrine:           Recent Lab Results:  LIPID RESULTS:  Lab Results   Component Value Date    CHOL 188 03/13/2017    HDL 46 03/13/2017     03/13/2017    TRIG 112 03/13/2017    CHOLHDLRATIO 4.1 03/13/2017    CHOLHDLRATIO 4.2 05/28/2015       LIVER ENZYME RESULTS:  Lab Results   Component Value Date    AST 25 08/17/2017    ALT 45 08/17/2017       CBC RESULTS:  Lab Results   Component Value Date    WBC 5.4 07/06/2017    RBC 4.11 (L) 07/06/2017    HGB 12.9 (L) 07/06/2017    HCT 37.2 (L) 07/06/2017    MCV 91 07/06/2017    MCH 31.4 07/06/2017    MCHC 34.7 07/06/2017    RDW 12.4 07/06/2017     07/06/2017       BMP RESULTS:  Lab Results   Component Value  Date     07/06/2017    POTASSIUM 4.3 08/22/2017    CHLORIDE 109 07/06/2017    CO2 22 07/06/2017    ANIONGAP 8 07/06/2017    GLC 95 07/06/2017    BUN 17 07/06/2017    CR 0.94 07/06/2017    GFRESTIMATED 80 07/06/2017    GFRESTBLACK >90   GFR Calc   07/06/2017    CHUCKY 8.6 07/06/2017        A1C RESULTS:  Lab Results   Component Value Date    A1C 5.5 03/13/2017       INR RESULTS:  Lab Results   Component Value Date    INR 1.13 07/06/2017    INR 1.18 (H) 06/05/2017       ECHOCARDIOGRAM  No results found for this or any previous visit (from the past 8760 hour(s)).      No orders of the defined types were placed in this encounter.    No orders of the defined types were placed in this encounter.    There are no discontinued medications.      Encounter Diagnosis   Name Primary?     Paroxysmal atrial fibrillation (H)        Thank you for allowing me to participate in the care of your patient.    Sincerely,     Sreekanth Aguayo MD     [facility]

## 2020-11-18 NOTE — PROGRESS NOTES
Electrophysiology/ Clinic Note         H&P and Plan:     REASON FOR VISIT: Electrophysiology evaluation.      HISTORY OF PRESENT ILLNESS: Mr. Gutierrez is a pleasant 75-year-old gentleman with history of hypertension, hyperlipidemia and recurrent persistent AFib (pulmonary vein isolation on 07/06/2017), who is here today for routine followup.     She underwent ablation 2017.  He did well after ablation.  He was very symptomatic before the ablation and decided to continue therapy with flecainide as he was concerned about recurrence of A. Fib.    They, he informs he is doing well.  He affirms he has had a few episodes of palpitations last year however the episodes were very brief and did not affect his lifestyle. He denies any symptoms such as chest pain, lightheadedness, near-syncope or syncopal episode.     Recent labs confirmed elevated cholesterol levels (total cholesterol 215 and HDL of 52).     EKG  today showed normal sinus rhythm with QRS measuring 108 milliseconds, which is unchanged from previous EKG.     PREVIOUS STUDIES:   -Stress echo (02/22/2018): Negative for ischemia.  Normal cardiac function and no EKG abnormalities.      ASSESSMENT AND PLAN:   1.  Recurrent persistent AFib.    He continues to do well and is tolerating medications well.  Continue therapy with flecainide and metoprolol.    2.  Embolic prevention.  CHADS-VASc score of 2.  Continue Eliquis indefinitely.   3.  Hypertension.  Blood pressure is well controlled.   4.  Hyperlipidemia.  He is cardiovascular risk in the next 10 years is around 27%.  I recommend start start statin.  He did not tolerate simvastatin in the past.  Recommend low Lipitor.  Follow-up with PCP to check levels.  5. Followup care.  Follow up in clinic in a year or earlier as needed.           Sreekanth Aguayo MD    Physical Exam:  Vitals: There were no vitals taken for this visit.    Constitutional:  AAO x3.  Pt is in NAD.  HEAD: normocephalic.  SKIN: Skin normal color,  texture and turgor with no lesions or eruptions.  Eyes: PERRL, EOMI.  ENT:  Supple, normal JVP. No lymphadenopathy or thyroid enlargement.  Chest:  CTAB, decrease breath sound in base B/L. Rales,  Wheezing.  Cardiac:  IIR, variable sound of S1 and Normal S2.  RRR, normal  S1 and S2.  No murmurs rubs or gallop.  Systolic murmur in LLSB II/VI.  Abdomen:  Normal BS.  Soft, non-tender and non-distended.  No rebound or guarding.    Extremities:  Pedious pulses palpable B/L.  No LE edema noticed.   Neurological: Strength and sensation grossly symmetric and intact throughout.       CURRENT MEDICATIONS:  Current Outpatient Medications   Medication Sig Dispense Refill     apixaban ANTICOAGULANT (ELIQUIS ANTICOAGULANT) 5 MG tablet Take 1 tablet (5 mg) by mouth 2 times daily 180 tablet 0     Elastic Bandage MISC 1 each daily. Use it on the right leg as instructed. 1 each 1     Elastic Bandages & Supports (JOBST FOR MEN KNEE HIGH/LG) MISC 1 each daily Use it on the right leg as instructed 1 each 1     flecainide (TAMBOCOR) 100 MG tablet Take 1 tablet (100 mg) by mouth 2 times daily 180 tablet 3     IRON 325 (65 FE) MG OR TABS 1 TABLET DAILY       lisinopril (PRINIVIL/ZESTRIL) 5 MG tablet Take 1 tablet (5 mg) by mouth daily 90 tablet 3     metoprolol succinate ER (TOPROL XL) 25 MG 24 hr tablet Take 1 tablet (25 mg) by mouth daily 90 tablet 3     nitroglycerin (NITROSTAT) 0.4 MG sublingual tablet For chest pain place 1 tablet under the tongue every 5 minutes for 3 doses. If symptoms persist 5 minutes after 1st dose call 911. 25 tablet 0     ORDER FOR DME Equipment being ordered: heel lift- MD 1 Device 0     sildenafil (VIAGRA) 50 MG tablet Take 0.5-1 tablets (25-50 mg) by mouth daily as needed for erectile dysfunction Take 30 min to 4 hours before intercourse.  Never use with nitroglycerin, terazosin or doxazosin. 36 tablet 0     STATIN NOT PRESCRIBED, INTENTIONAL, by Other route continuous prn.  0       ALLERGIES   No Known  Allergies    PAST MEDICAL HISTORY:  Past Medical History:   Diagnosis Date     CKD (chronic kidney disease)      Gastric ulcer      HTN (hypertension), benign      Hyperlipidaemia      Paroxysmal atrial fibrillation (H) 2013       PAST SURGICAL HISTORY:  Past Surgical History:   Procedure Laterality Date     ANESTHESIA CARDIOVERSION N/A 2017    Procedure: ANESTHESIA CARDIOVERSION;  ANESTHESIA NEEDED FOR CARDIOVERSION IN CARE SUITES. (RHETT AT 0830);  Surgeon: GENERIC ANESTHESIA PROVIDER;  Location: [facility] OR     BRONCHOSCOPY       C INCISION OF PYLORIC MUSCLE       CARDIOVERSION  2013, 17     COLONOSCOPY       EXTRACAPSULAR CATARACT EXTRATION WITH INTRAOCULAR LENS IMPLANT      right eye     H ABLATION FOCAL AFIB  2017       FAMILY HISTORY:  Family History   Problem Relation Age of Onset     Cancer Mother      Cancer Father      Colon Cancer Father      Prostate Cancer No family hx of        SOCIAL HISTORY:  Social History     Socioeconomic History     Marital status:      Spouse name: Not on file     Number of children: Not on file     Years of education: Not on file     Highest education level: Not on file   Occupational History     Not on file   Social Needs     Financial resource strain: Not on file     Food insecurity     Worry: Not on file     Inability: Not on file     Transportation needs     Medical: Not on file     Non-medical: Not on file   Tobacco Use     Smoking status: Former Smoker     Years: 38.00     Types: Cigarettes     Quit date: 2012     Years since quittin.3     Smokeless tobacco: Never Used   Substance and Sexual Activity     Alcohol use: Yes     Comment: ocass     Drug use: No     Sexual activity: Yes     Partners: Female   Lifestyle     Physical activity     Days per week: Not on file     Minutes per session: Not on file     Stress: Not on file   Relationships     Social connections     Talks on phone: Not on file     Gets together: Not on file      Attends Adventism service: Not on file     Active member of club or organization: Not on file     Attends meetings of clubs or organizations: Not on file     Relationship status: Not on file     Intimate partner violence     Fear of current or ex partner: Not on file     Emotionally abused: Not on file     Physically abused: Not on file     Forced sexual activity: Not on file   Other Topics Concern     Parent/sibling w/ CABG, MI or angioplasty before 65F 55M? No      Service Yes     Blood Transfusions No     Caffeine Concern No     Occupational Exposure No     Hobby Hazards No     Sleep Concern No     Stress Concern No     Weight Concern No     Special Diet No     Back Care No     Exercise Yes     Bike Helmet No     Seat Belt Yes     Self-Exams No   Social History Narrative     Not on file       Review of Systems:  Skin:        Eyes:       ENT:       Respiratory:       Cardiovascular:       Gastroenterology:      Genitourinary:       Musculoskeletal:       Neurologic:       Psychiatric:       Heme/Lymph/Imm:       Endocrine:           Recent Lab Results:  LIPID RESULTS:  Lab Results   Component Value Date    CHOL 188 03/13/2017    HDL 46 03/13/2017     03/13/2017    TRIG 112 03/13/2017    CHOLHDLRATIO 4.1 03/13/2017    CHOLHDLRATIO 4.2 05/28/2015       LIVER ENZYME RESULTS:  Lab Results   Component Value Date    AST 25 08/17/2017    ALT 45 08/17/2017       CBC RESULTS:  Lab Results   Component Value Date    WBC 5.4 07/06/2017    RBC 4.11 (L) 07/06/2017    HGB 12.9 (L) 07/06/2017    HCT 37.2 (L) 07/06/2017    MCV 91 07/06/2017    MCH 31.4 07/06/2017    MCHC 34.7 07/06/2017    RDW 12.4 07/06/2017     07/06/2017       BMP RESULTS:  Lab Results   Component Value Date     07/06/2017    POTASSIUM 4.3 08/22/2017    CHLORIDE 109 07/06/2017    CO2 22 07/06/2017    ANIONGAP 8 07/06/2017    GLC 95 07/06/2017    BUN 17 07/06/2017    CR 0.94 07/06/2017    GFRESTIMATED 80 07/06/2017    GFRESTBLACK  >90   GFR Calc   07/06/2017    CHUCKY 8.6 07/06/2017        A1C RESULTS:  Lab Results   Component Value Date    A1C 5.5 03/13/2017       INR RESULTS:  Lab Results   Component Value Date    INR 1.13 07/06/2017    INR 1.18 (H) 06/05/2017         ECHOCARDIOGRAM  No results found for this or any previous visit (from the past 8760 hour(s)).      No orders of the defined types were placed in this encounter.    No orders of the defined types were placed in this encounter.    There are no discontinued medications.      Encounter Diagnosis   Name Primary?     Paroxysmal atrial fibrillation (H)          CC  Sreekanth Aguayo MD  [id number]  [place] [id number]

## 2020-11-18 NOTE — LETTER
11/18/2020    MIKE  [facility] [address]  [facility] [address]    RE: Charlene Gutierrez       Dear Colleague,    I had the pleasure of seeing Charlene Gutierrez in the [facility].    Electrophysiology/ Clinic Note         H&P and Plan:     REASON FOR VISIT: Electrophysiology evaluation.      HISTORY OF PRESENT ILLNESS: Mr. Gutierrez is a pleasant 75-year-old gentleman with history of hypertension, hyperlipidemia and recurrent persistent AFib (pulmonary vein isolation on 07/06/2017), who is here today for routine followup.     She underwent ablation 2017.  He did well after ablation.  He was very symptomatic before the ablation and decided to continue therapy with flecainide as he was concerned about recurrence of A. Fib.    They, he informs he is doing well.  He affirms he has had a few episodes of palpitations last year however the episodes were very brief and did not affect his lifestyle. He denies any symptoms such as chest pain, lightheadedness, near-syncope or syncopal episode.     Recent labs confirmed elevated cholesterol levels (total cholesterol 215 and HDL of 52).     EKG  today showed normal sinus rhythm with QRS measuring 108 milliseconds, which is unchanged from previous EKG.     PREVIOUS STUDIES:   -Stress echo (02/22/2018): Negative for ischemia.  Normal cardiac function and no EKG abnormalities.      ASSESSMENT AND PLAN:   1.  Recurrent persistent AFib.    He continues to do well and is tolerating medications well.  Continue therapy with flecainide and metoprolol.    2.  Embolic prevention.  CHADS-VASc score of 2.  Continue Eliquis indefinitely.   3.  Hypertension.  Blood pressure is well controlled.   4.  Hyperlipidemia.  He is cardiovascular risk in the next 10 years is around 27%.  I recommend start start statin.  He did not tolerate simvastatin in the past.  Recommend low Lipitor.  Follow-up with PCP to check levels.  5. Followup care.   Follow up in clinic in a year or earlier as needed.           Sreekanth Aguayo MD    Physical Exam:  Vitals: There were no vitals taken for this visit.    Constitutional:  AAO x3.  Pt is in NAD.  HEAD: normocephalic.  SKIN: Skin normal color, texture and turgor with no lesions or eruptions.  Eyes: PERRL, EOMI.  ENT:  Supple, normal JVP. No lymphadenopathy or thyroid enlargement.  Chest:  CTAB, decrease breath sound in base B/L. Rales,  Wheezing.  Cardiac:  IIR, variable sound of S1 and Normal S2.  RRR, normal  S1 and S2.  No murmurs rubs or gallop.  Systolic murmur in LLSB II/VI.  Abdomen:  Normal BS.  Soft, non-tender and non-distended.  No rebound or guarding.    Extremities:  Pedious pulses palpable B/L.  No LE edema noticed.   Neurological: Strength and sensation grossly symmetric and intact throughout.       CURRENT MEDICATIONS:  Current Outpatient Medications   Medication Sig Dispense Refill     apixaban ANTICOAGULANT (ELIQUIS ANTICOAGULANT) 5 MG tablet Take 1 tablet (5 mg) by mouth 2 times daily 180 tablet 0     Elastic Bandage MISC 1 each daily. Use it on the right leg as instructed. 1 each 1     Elastic Bandages & Supports (JOBST FOR MEN KNEE HIGH/LG) MISC 1 each daily Use it on the right leg as instructed 1 each 1     flecainide (TAMBOCOR) 100 MG tablet Take 1 tablet (100 mg) by mouth 2 times daily 180 tablet 3     IRON 325 (65 FE) MG OR TABS 1 TABLET DAILY       lisinopril (PRINIVIL/ZESTRIL) 5 MG tablet Take 1 tablet (5 mg) by mouth daily 90 tablet 3     metoprolol succinate ER (TOPROL XL) 25 MG 24 hr tablet Take 1 tablet (25 mg) by mouth daily 90 tablet 3     nitroglycerin (NITROSTAT) 0.4 MG sublingual tablet For chest pain place 1 tablet under the tongue every 5 minutes for 3 doses. If symptoms persist 5 minutes after 1st dose call 911. 25 tablet 0     ORDER FOR DME Equipment being ordered: heel lift- MD 1 Device 0     sildenafil (VIAGRA) 50 MG tablet Take 0.5-1 tablets (25-50 mg) by mouth daily as  needed for erectile dysfunction Take 30 min to 4 hours before intercourse.  Never use with nitroglycerin, terazosin or doxazosin. 36 tablet 0     STATIN NOT PRESCRIBED, INTENTIONAL, by Other route continuous prn.  0       ALLERGIES   No Known Allergies    PAST MEDICAL HISTORY:  Past Medical History:   Diagnosis Date     CKD (chronic kidney disease)      Gastric ulcer      HTN (hypertension), benign      Hyperlipidaemia      Paroxysmal atrial fibrillation (H) 2013       PAST SURGICAL HISTORY:  Past Surgical History:   Procedure Laterality Date     ANESTHESIA CARDIOVERSION N/A 2017    Procedure: ANESTHESIA CARDIOVERSION;  ANESTHESIA NEEDED FOR CARDIOVERSION IN CARE SUITES. (RHETT AT 0830);  Surgeon: GENERIC ANESTHESIA PROVIDER;  Location: [facility] OR     BRONCHOSCOPY       C INCISION OF PYLORIC MUSCLE       CARDIOVERSION  2013, 17     COLONOSCOPY       EXTRACAPSULAR CATARACT EXTRATION WITH INTRAOCULAR LENS IMPLANT      right eye     H ABLATION FOCAL AFIB  2017       FAMILY HISTORY:  Family History   Problem Relation Age of Onset     Cancer Mother      Cancer Father      Colon Cancer Father      Prostate Cancer No family hx of        SOCIAL HISTORY:  Social History     Socioeconomic History     Marital status:      Spouse name: Not on file     Number of children: Not on file     Years of education: Not on file     Highest education level: Not on file   Occupational History     Not on file   Social Needs     Financial resource strain: Not on file     Food insecurity     Worry: Not on file     Inability: Not on file     Transportation needs     Medical: Not on file     Non-medical: Not on file   Tobacco Use     Smoking status: Former Smoker     Years: 38.00     Types: Cigarettes     Quit date: 2012     Years since quittin.3     Smokeless tobacco: Never Used   Substance and Sexual Activity     Alcohol use: Yes     Comment: ocass     Drug use: No     Sexual activity: Yes     Partners:  Female   Lifestyle     Physical activity     Days per week: Not on file     Minutes per session: Not on file     Stress: Not on file   Relationships     Social connections     Talks on phone: Not on file     Gets together: Not on file     Attends [employer]ism service: Not on file     Active member of club or organization: Not on file     Attends meetings of clubs or organizations: Not on file     Relationship status: Not on file     Intimate partner violence     Fear of current or ex partner: Not on file     Emotionally abused: Not on file     Physically abused: Not on file     Forced sexual activity: Not on file   Other Topics Concern     Parent/sibling w/ CABG, MI or angioplasty before 65F 55M? No      Service Yes     Blood Transfusions No     Caffeine Concern No     Occupational Exposure No     Hobby Hazards No     Sleep Concern No     Stress Concern No     Weight Concern No     Special Diet No     Back Care No     Exercise Yes     Bike Helmet No     Seat Belt Yes     Self-Exams No   Social History Narrative     Not on file       Review of Systems:  Skin:        Eyes:       ENT:       Respiratory:       Cardiovascular:       Gastroenterology:      Genitourinary:       Musculoskeletal:       Neurologic:       Psychiatric:       Heme/Lymph/Imm:       Endocrine:           Recent Lab Results:  LIPID RESULTS:  Lab Results   Component Value Date    CHOL 188 03/13/2017    HDL 46 03/13/2017     03/13/2017    TRIG 112 03/13/2017    CHOLHDLRATIO 4.1 03/13/2017    CHOLHDLRATIO 4.2 05/28/2015       LIVER ENZYME RESULTS:  Lab Results   Component Value Date    AST 25 08/17/2017    ALT 45 08/17/2017       CBC RESULTS:  Lab Results   Component Value Date    WBC 5.4 07/06/2017    RBC 4.11 (L) 07/06/2017    HGB 12.9 (L) 07/06/2017    HCT 37.2 (L) 07/06/2017    MCV 91 07/06/2017    MCH 31.4 07/06/2017    MCHC 34.7 07/06/2017    RDW 12.4 07/06/2017     07/06/2017       BMP RESULTS:  Lab Results   Component Value  Date     07/06/2017    POTASSIUM 4.3 08/22/2017    CHLORIDE 109 07/06/2017    CO2 22 07/06/2017    ANIONGAP 8 07/06/2017    GLC 95 07/06/2017    BUN 17 07/06/2017    CR 0.94 07/06/2017    GFRESTIMATED 80 07/06/2017    GFRESTBLACK >90   GFR Calc   07/06/2017    CHUCKY 8.6 07/06/2017        A1C RESULTS:  Lab Results   Component Value Date    A1C 5.5 03/13/2017       INR RESULTS:  Lab Results   Component Value Date    INR 1.13 07/06/2017    INR 1.18 (H) 06/05/2017         ECHOCARDIOGRAM  No results found for this or any previous visit (from the past 8760 hour(s)).      No orders of the defined types were placed in this encounter.    No orders of the defined types were placed in this encounter.    There are no discontinued medications.      Encounter Diagnosis   Name Primary?     Paroxysmal atrial fibrillation (H)          CC  Sreekanth Aguayo MD  [address]  [place] [id number]                  Thank you for allowing me to participate in the care of your patient.      Sincerely,     Sreekanth Aguayo MD     [facility]    cc:   Sreekanth Aguayo MD  [address]  [place] [id number]

## 2020-11-28 DIAGNOSIS — Z11.59 ENCOUNTER FOR SCREENING FOR OTHER VIRAL DISEASES: ICD-10-CM

## 2020-11-28 LAB
SARS-COV-2 RNA SPEC QL NAA+PROBE: NOT DETECTED
SPECIMEN SOURCE: NORMAL

## 2020-11-28 PROCEDURE — U0003 INFECTIOUS AGENT DETECTION BY NUCLEIC ACID (DNA OR RNA); SEVERE ACUTE RESPIRATORY SYNDROME CORONAVIRUS 2 (SARS-COV-2) (CORONAVIRUS DISEASE [COVID-19]), AMPLIFIED PROBE TECHNIQUE, MAKING USE OF HIGH THROUGHPUT TECHNOLOGIES AS DESCRIBED BY CMS-2020-01-R: HCPCS | Performed by: COLON & RECTAL SURGERY

## 2020-11-30 DIAGNOSIS — I48.19 PERSISTENT ATRIAL FIBRILLATION (H): ICD-10-CM

## 2020-11-30 RX ORDER — FLECAINIDE ACETATE 100 MG/1
100 TABLET ORAL 2 TIMES DAILY
Qty: 180 TABLET | Refills: 3 | Status: SHIPPED | OUTPATIENT
Start: 2020-11-30 | End: 2021-11-12

## 2020-11-30 RX ORDER — METOPROLOL SUCCINATE 25 MG/1
25 TABLET, EXTENDED RELEASE ORAL DAILY
Qty: 90 TABLET | Refills: 3 | Status: SHIPPED | OUTPATIENT
Start: 2020-11-30 | End: 2021-11-12

## 2020-12-01 ENCOUNTER — HOSPITAL ENCOUNTER (OUTPATIENT)
Facility: CLINIC | Age: 75
Discharge: HOME | End: 2020-12-01
Attending: COLON & RECTAL SURGERY | Admitting: COLON & RECTAL SURGERY
Payer: COMMERCIAL

## 2020-12-01 VITALS
TEMPERATURE: 98.1 F | OXYGEN SATURATION: 97 % | DIASTOLIC BLOOD PRESSURE: 74 MMHG | SYSTOLIC BLOOD PRESSURE: 115 MMHG | RESPIRATION RATE: 26 BRPM | HEIGHT: 65 IN | BODY MASS INDEX: 27.49 KG/M2 | WEIGHT: 165 LBS | HEART RATE: 68 BPM

## 2020-12-01 LAB — COLONOSCOPY: NORMAL

## 2020-12-01 PROCEDURE — 99153 MOD SED SAME PHYS/QHP EA: CPT

## 2020-12-01 PROCEDURE — 45378 DIAGNOSTIC COLONOSCOPY: CPT | Performed by: COLON & RECTAL SURGERY

## 2020-12-01 PROCEDURE — G0500 MOD SEDAT ENDO SERVICE >5YRS: HCPCS | Performed by: COLON & RECTAL SURGERY

## 2020-12-01 PROCEDURE — 250N000011 HC RX IP 250 OP 636: Performed by: COLON & RECTAL SURGERY

## 2020-12-01 PROCEDURE — G0105 COLORECTAL SCRN; HI RISK IND: HCPCS | Performed by: COLON & RECTAL SURGERY

## 2020-12-01 RX ORDER — ONDANSETRON 2 MG/ML
4 INJECTION INTRAMUSCULAR; INTRAVENOUS EVERY 6 HOURS PRN
Status: DISCONTINUED | OUTPATIENT
Start: 2020-12-01 | End: 2020-12-01 | Stop reason: HOSPADM

## 2020-12-01 RX ORDER — ONDANSETRON 4 MG/1
4 TABLET, ORALLY DISINTEGRATING ORAL EVERY 6 HOURS PRN
Status: DISCONTINUED | OUTPATIENT
Start: 2020-12-01 | End: 2020-12-01 | Stop reason: HOSPADM

## 2020-12-01 RX ORDER — NALOXONE HYDROCHLORIDE 0.4 MG/ML
0.4 INJECTION, SOLUTION INTRAMUSCULAR; INTRAVENOUS; SUBCUTANEOUS
Status: DISCONTINUED | OUTPATIENT
Start: 2020-12-01 | End: 2020-12-01 | Stop reason: HOSPADM

## 2020-12-01 RX ORDER — FENTANYL CITRATE 50 UG/ML
INJECTION, SOLUTION INTRAMUSCULAR; INTRAVENOUS PRN
Status: DISCONTINUED | OUTPATIENT
Start: 2020-12-01 | End: 2020-12-01 | Stop reason: HOSPADM

## 2020-12-01 RX ORDER — NALOXONE HYDROCHLORIDE 0.4 MG/ML
0.2 INJECTION, SOLUTION INTRAMUSCULAR; INTRAVENOUS; SUBCUTANEOUS
Status: DISCONTINUED | OUTPATIENT
Start: 2020-12-01 | End: 2020-12-01 | Stop reason: HOSPADM

## 2020-12-01 RX ORDER — LIDOCAINE 40 MG/G
CREAM TOPICAL
Status: DISCONTINUED | OUTPATIENT
Start: 2020-12-01 | End: 2020-12-01 | Stop reason: HOSPADM

## 2020-12-01 RX ORDER — ONDANSETRON 2 MG/ML
4 INJECTION INTRAMUSCULAR; INTRAVENOUS
Status: DISCONTINUED | OUTPATIENT
Start: 2020-12-01 | End: 2020-12-01 | Stop reason: HOSPADM

## 2020-12-01 RX ORDER — PROCHLORPERAZINE MALEATE 5 MG
5 TABLET ORAL EVERY 6 HOURS PRN
Status: DISCONTINUED | OUTPATIENT
Start: 2020-12-01 | End: 2020-12-01 | Stop reason: HOSPADM

## 2020-12-01 RX ORDER — FLUMAZENIL 0.1 MG/ML
0.2 INJECTION, SOLUTION INTRAVENOUS
Status: DISCONTINUED | OUTPATIENT
Start: 2020-12-01 | End: 2020-12-01 | Stop reason: HOSPADM

## 2020-12-01 ASSESSMENT — MIFFLIN-ST. JEOR: SCORE: 1420.32

## 2020-12-01 NOTE — H&P
Pre-Endoscopy History and Physical   Charlene Gutierrez MRN# [id number]   YOB: 1945 Age: 75 year old   Date of Procedure: 2020   Primary care provider: Ann Irving   Type of Endoscopy: colonoscopy   Reason for Procedure: personal history of polyps   Type of Anesthesia Anticipated: Moderate Sedation   HPI:   Charlene is a 75 year old male with a personal history of polyps.  He last reports having had a colonoscopy 3 years ago where a polyp was removed.  He denies BRBPR, abdominal pain, nausea/vomiting, changes in bowel habits or unintentional weight loss.  His father had colon cancer in his 60s.    No Known Allergies   Prior to Admission Medications   Prescriptions Last Dose Informant Patient Reported? Taking?   Elastic Bandage MISC   No No   Si each daily. Use it on the right leg as instructed.   Elastic Bandages & Supports (JOBST FOR MEN KNEE HIGH/LG) MISC   No No   Si each daily Use it on the right leg as instructed   IRON 325 (65 FE) MG OR TABS   Yes No   Si TABLET DAILY   ORDER FOR DME   No No   Sig: Equipment being ordered: heel lift- MD   STATIN NOT PRESCRIBED, INTENTIONAL,   Yes No   Sig: by Other route continuous prn.   apixaban ANTICOAGULANT (ELIQUIS ANTICOAGULANT) 5 MG tablet 2020  No No   Sig: Take 1 tablet (5 mg) by mouth 2 times daily   atorvastatin (LIPITOR) 10 MG tablet   No No   Sig: Take 1 tablet (10 mg) by mouth daily   flecainide (TAMBOCOR) 100 MG tablet 2020 at Unknown time  No Yes   Sig: Take 1 tablet (100 mg) by mouth 2 times daily   lisinopril (PRINIVIL/ZESTRIL) 5 MG tablet 2020 at Unknown time  No Yes   Sig: Take 1 tablet (5 mg) by mouth daily   metoprolol succinate ER (TOPROL XL) 25 MG 24 hr tablet 2020 at Unknown time  No Yes   Sig: Take 1 tablet (25 mg) by mouth daily   nitroglycerin (NITROSTAT) 0.4 MG sublingual tablet   No No   Sig: For chest pain place 1 tablet under the tongue every 5 minutes for 3 doses. If symptoms persist 5  minutes after 1st dose call 911.   sildenafil (VIAGRA) 50 MG tablet   No No   Sig: Take 0.5-1 tablets (25-50 mg) by mouth daily as needed for erectile dysfunction Take 30 min to 4 hours before intercourse.  Never use with nitroglycerin, terazosin or doxazosin.      Facility-Administered Medications: None      Patient Active Problem List   Diagnosis     Varicose Veins-right     Advance care planning     Elevated fasting glucose     Hyperlipidemia LDL goal <130     Overweight (BMI 25.0-29.9)     Hypertension goal BP (blood pressure) < 140/90     Atrial fibrillation (H)     Family history of colon cancer     Achilles bursitis or tendinitis     Left knee pain     Knee pain     CKD (chronic kidney disease) stage 2, GFR 60-89 ml/min     Essential hypertension with goal blood pressure less than 140/90     Paroxysmal atrial fibrillation (H)      Past Medical History:   Diagnosis Date     CKD (chronic kidney disease)      Gastric ulcer      HTN (hypertension), benign      Hyperlipidaemia      Paroxysmal atrial fibrillation (H) 2013      Past Surgical History:   Procedure Laterality Date     ANESTHESIA CARDIOVERSION N/A 2017    Procedure: ANESTHESIA CARDIOVERSION;  ANESTHESIA NEEDED FOR CARDIOVERSION IN CARE SUITES. (RHETT AT 0830);  Surgeon: GENERIC ANESTHESIA PROVIDER;  Location: [facility] OR     BRONCHOSCOPY       C INCISION OF PYLORIC MUSCLE       CARDIOVERSION  2013, 17     COLONOSCOPY       EXTRACAPSULAR CATARACT EXTRATION WITH INTRAOCULAR LENS IMPLANT      right eye     H ABLATION FOCAL AFIB  2017      Social History     Tobacco Use     Smoking status: Former Smoker     Years: 38.00     Types: Cigarettes     Quit date: 2012     Years since quittin.3     Smokeless tobacco: Never Used   Substance Use Topics     Alcohol use: Yes     Comment: ocass      Family History   Problem Relation Age of Onset     Cancer Mother      Cancer Father      Colon Cancer Father      Prostate Cancer No family  "hx of       PHYSICAL EXAM:   BP (!) 142/78   Temp 98.1  F (36.7  C) (Temporal)   Ht 1.651 m (5' 5\")   Wt 74.8 kg (165 lb)   SpO2 95%   BMI 27.46 kg/m   Estimated body mass index is 27.46 kg/m  as calculated from the following:    Height as of this encounter: 1.651 m (5' 5\").    Weight as of this encounter: 74.8 kg (165 lb).   RESP: lungs clear to auscultation - no rales, rhonchi or wheezes   CV: regular rates and rhythm   ASA Class 2 - Mild systemic disease    Assessment: 74 y/o gentleman with a personal history of polyps    Plan: Colonoscopy with moderate sedation.  Risks of the procedure were discussed including, but not limited to, bleeding, perforation and missed lesions.  Patient understands and is willing to proceed.    Jarrod Pena MD ....................  12/1/2020   10:31 AM  Colon and Rectal Surgery Staff  [phone]    "

## 2020-12-13 ENCOUNTER — HEALTH MAINTENANCE LETTER (OUTPATIENT)
Age: 75
End: 2020-12-13

## 2020-12-29 DIAGNOSIS — I48.91 ATRIAL FIBRILLATION, UNSPECIFIED TYPE (H): ICD-10-CM

## 2021-02-09 ENCOUNTER — OFFICE VISIT (OUTPATIENT)
Dept: CARDIOLOGY | Facility: CLINIC | Age: 76
End: 2021-02-09
Payer: COMMERCIAL

## 2021-02-09 VITALS
BODY MASS INDEX: 29.99 KG/M2 | SYSTOLIC BLOOD PRESSURE: 126 MMHG | HEIGHT: 65 IN | HEART RATE: 75 BPM | DIASTOLIC BLOOD PRESSURE: 82 MMHG | WEIGHT: 180 LBS

## 2021-02-09 DIAGNOSIS — I48.19 PERSISTENT ATRIAL FIBRILLATION (H): Primary | ICD-10-CM

## 2021-02-09 PROCEDURE — 93000 ELECTROCARDIOGRAM COMPLETE: CPT | Performed by: INTERNAL MEDICINE

## 2021-02-09 PROCEDURE — 99213 OFFICE O/P EST LOW 20 MIN: CPT | Performed by: INTERNAL MEDICINE

## 2021-02-09 ASSESSMENT — MIFFLIN-ST. JEOR: SCORE: 1488.35

## 2021-02-09 NOTE — PROGRESS NOTES
"Electrophysiology/ Clinic Note         H&P and Plan:     REASON FOR VISIT: Electrophysiology evaluation.      HISTORY OF PRESENT ILLNESS: Mr. Gutierrez is a pleasant 75-year-old gentleman with history of hypertension, hyperlipidemia and recurrent persistent AFib (pulmonary vein isolation on 07/06/2017), who is here today for routine followup.     Patient has been on chronic therapy with flecainide.  He failed therapy with flecainide and underwent A. fib ablation 2017.  He did well after ablation.  However, 4 days ago he started having episodes of palpitation.  He contacted our office in a scheduled visit.    He denies any other symptoms such as chest pain, shortness of breath, near-syncope or syncopal episode.      EKG  today showed atrial fibrillation with good heart rate control.       PREVIOUS STUDIES:   -Stress echo (02/22/2018): Negative for ischemia.  Normal cardiac function and no EKG abnormalities.      ASSESSMENT AND PLAN:   1.  Recurrent persistent AFib.    He did well after ablation.  However, he presents with recurrence of A. fib despite of flecainide therapy.  I recommend electrical cardioversion.  He is very familiar with the procedure and understand there is a 1-2% risk associated procedure.  He confirms he has been compliant with Eliquis therapy and has not missed any doses in the last month. He agrees with plan.  We will make arrangements to have cardioversion done.    2.  Embolic prevention.  CHADS-VASc score of 2.  Continue Eliquis indefinitely.   3.  Hypertension.  Blood pressure is well controlled.   4.  Followup care.  We discussed follow-up after cardioversion.        Sreekanth Aguayo MD    Physical Exam:  Vitals: /82   Pulse 75   Ht 1.651 m (5' 5\")   Wt 81.6 kg (180 lb)   BMI 29.95 kg/m      Constitutional:  AAO x3.  Pt is in NAD.  HEAD: normocephalic.  SKIN: Skin normal color, texture and turgor with no lesions or eruptions.  Eyes: PERRL, EOMI.  ENT:  Supple, normal JVP. No " lymphadenopathy or thyroid enlargement.  Chest:  CTAB.  Cardiac:  IIR, variable sound of S1 and Normal S2.  No murmurs rubs or gallop.    Abdomen:  Normal BS.  Soft, non-tender and non-distended.  No rebound or guarding.    Extremities:  Pedious pulses palpable B/L.  No LE edema noticed.   Neurological: Strength and sensation grossly symmetric and intact throughout.       CURRENT MEDICATIONS:  Current Outpatient Medications   Medication Sig Dispense Refill     apixaban ANTICOAGULANT (ELIQUIS ANTICOAGULANT) 5 MG tablet Take 1 tablet (5 mg) by mouth 2 times daily 180 tablet 1     atorvastatin (LIPITOR) 10 MG tablet Take 1 tablet (10 mg) by mouth daily 30 tablet 3     Elastic Bandage MISC 1 each daily. Use it on the right leg as instructed. 1 each 1     Elastic Bandages & Supports (JOBST FOR MEN KNEE HIGH/LG) MISC 1 each daily Use it on the right leg as instructed 1 each 1     flecainide (TAMBOCOR) 100 MG tablet Take 1 tablet (100 mg) by mouth 2 times daily 180 tablet 3     IRON 325 (65 FE) MG OR TABS 1 TABLET DAILY       lisinopril (PRINIVIL/ZESTRIL) 5 MG tablet Take 1 tablet (5 mg) by mouth daily 90 tablet 3     metoprolol succinate ER (TOPROL XL) 25 MG 24 hr tablet Take 1 tablet (25 mg) by mouth daily 90 tablet 3     nitroglycerin (NITROSTAT) 0.4 MG sublingual tablet For chest pain place 1 tablet under the tongue every 5 minutes for 3 doses. If symptoms persist 5 minutes after 1st dose call 911. 25 tablet 0     ORDER FOR DME Equipment being ordered: heel lift- MD 1 Device 0     sildenafil (VIAGRA) 50 MG tablet Take 0.5-1 tablets (25-50 mg) by mouth daily as needed for erectile dysfunction Take 30 min to 4 hours before intercourse.  Never use with nitroglycerin, terazosin or doxazosin. 36 tablet 0     STATIN NOT PRESCRIBED, INTENTIONAL, by Other route continuous prn.  0       ALLERGIES   No Known Allergies    PAST MEDICAL HISTORY:  Past Medical History:   Diagnosis Date     CKD (chronic kidney disease) 2012      Gastric ulcer      HTN (hypertension), benign      Hyperlipidaemia      Paroxysmal atrial fibrillation (H) 2013       PAST SURGICAL HISTORY:  Past Surgical History:   Procedure Laterality Date     ANESTHESIA CARDIOVERSION N/A 2017    Procedure: ANESTHESIA CARDIOVERSION;  ANESTHESIA NEEDED FOR CARDIOVERSION IN CARE SUITES. (RHETT AT 0830);  Surgeon: GENERIC ANESTHESIA PROVIDER;  Location:  OR     BRONCHOSCOPY       C INCISION OF PYLORIC MUSCLE       CARDIOVERSION  2013, 17     COLONOSCOPY       COLONOSCOPY N/A 2020    Procedure: COLONOSCOPY;  Surgeon: Jarrod Pena MD;  Location:  GI     EXTRACAPSULAR CATARACT EXTRATION WITH INTRAOCULAR LENS IMPLANT      right eye     H ABLATION FOCAL AFIB  2017       FAMILY HISTORY:  Family History   Problem Relation Age of Onset     Cancer Mother      Cancer Father      Colon Cancer Father      Prostate Cancer No family hx of        SOCIAL HISTORY:  Social History     Socioeconomic History     Marital status:      Spouse name: None     Number of children: None     Years of education: None     Highest education level: None   Occupational History     None   Social Needs     Financial resource strain: None     Food insecurity     Worry: None     Inability: None     Transportation needs     Medical: None     Non-medical: None   Tobacco Use     Smoking status: Former Smoker     Years: 38.00     Types: Cigarettes     Quit date: 2012     Years since quittin.5     Smokeless tobacco: Never Used   Substance and Sexual Activity     Alcohol use: Yes     Comment: ocass     Drug use: No     Sexual activity: Yes     Partners: Female   Lifestyle     Physical activity     Days per week: None     Minutes per session: None     Stress: None   Relationships     Social connections     Talks on phone: None     Gets together: None     Attends [employer] service: None     Active member of club or organization: None     Attends meetings of clubs or  organizations: None     Relationship status: None     Intimate partner violence     Fear of current or ex partner: None     Emotionally abused: None     Physically abused: None     Forced sexual activity: None   Other Topics Concern     Parent/sibling w/ CABG, MI or angioplasty before 65F 55M? No      Service Yes     Blood Transfusions No     Caffeine Concern No     Occupational Exposure No     Hobby Hazards No     Sleep Concern No     Stress Concern No     Weight Concern No     Special Diet No     Back Care No     Exercise Yes     Bike Helmet No     Seat Belt Yes     Self-Exams No   Social History Narrative     None       Review of Systems:  Skin:  Negative rash   Eyes:  Positive for glasses  ENT:  Negative    Respiratory:  Negative shortness of breath;dyspnea on exertion;cough  Cardiovascular:  Negative;palpitations    Gastroenterology: Negative melena;hematochezia  Genitourinary:  Negative dysuria  Musculoskeletal:  Positive for arthritis;joint pain  Neurologic:  Negative speech problems;headaches;local weakness;paralysis;incoordination  Psychiatric:  Negative    Heme/Lymph/Imm:  Negative    Endocrine:  Negative        Recent Lab Results:  LIPID RESULTS:  Lab Results   Component Value Date    CHOL 188 03/13/2017    HDL 46 03/13/2017     03/13/2017    TRIG 112 03/13/2017    CHOLHDLRATIO 4.1 03/13/2017    CHOLHDLRATIO 4.2 05/28/2015       LIVER ENZYME RESULTS:  Lab Results   Component Value Date    AST 25 08/17/2017    ALT 45 08/17/2017       CBC RESULTS:  Lab Results   Component Value Date    WBC 5.4 07/06/2017    RBC 4.11 (L) 07/06/2017    HGB 12.9 (L) 07/06/2017    HCT 37.2 (L) 07/06/2017    MCV 91 07/06/2017    MCH 31.4 07/06/2017    MCHC 34.7 07/06/2017    RDW 12.4 07/06/2017     07/06/2017       BMP RESULTS:  Lab Results   Component Value Date     07/06/2017    POTASSIUM 4.3 08/22/2017    CHLORIDE 109 07/06/2017    CO2 22 07/06/2017    ANIONGAP 8 07/06/2017    GLC 95 07/06/2017     BUN 17 07/06/2017    CR 0.94 07/06/2017    GFRESTIMATED 80 07/06/2017    GFRESTBLACK >90   GFR Calc   07/06/2017    CHUCKY 8.6 07/06/2017        A1C RESULTS:  Lab Results   Component Value Date    A1C 5.5 03/13/2017       INR RESULTS:  Lab Results   Component Value Date    INR 1.13 07/06/2017    INR 1.18 (H) 06/05/2017         ECHOCARDIOGRAM  No results found for this or any previous visit (from the past 8760 hour(s)).      Orders Placed This Encounter   Procedures     EKG 12-lead complete w/read - Clinics (future- to be scheduled)     No orders of the defined types were placed in this encounter.    There are no discontinued medications.      Encounter Diagnosis   Name Primary?     Atrial fibrillation (H) Yes         CC  No referring provider defined for this encounter.

## 2021-02-09 NOTE — LETTER
2/9/2021    MIKE DEMPSEY  [facility] [address]  [facility] [address]    RE: Charlene Bahepi       Dear Colleague,    I had the pleasure of seeing Charlene Gutierrez in the [facility].    Electrophysiology/ Clinic Note         H&P and Plan:     REASON FOR VISIT: Electrophysiology evaluation.      HISTORY OF PRESENT ILLNESS: Mr. Gutierrez is a pleasant 75-year-old gentleman with history of hypertension, hyperlipidemia and recurrent persistent AFib (pulmonary vein isolation on 07/06/2017), who is here today for routine followup.     Patient has been on chronic therapy with flecainide.  He failed therapy with flecainide and underwent A. fib ablation 2017.  He did well after ablation.  However, 4 days ago he started having episodes of palpitation.  He contacted our office in a scheduled visit.    He denies any other symptoms such as chest pain, shortness of breath, near-syncope or syncopal episode.      EKG  today showed atrial fibrillation with good heart rate control.       PREVIOUS STUDIES:   -Stress echo (02/22/2018): Negative for ischemia.  Normal cardiac function and no EKG abnormalities.      ASSESSMENT AND PLAN:   1.  Recurrent persistent AFib.    He did well after ablation.  However, he presents with recurrence of A. fib despite of flecainide therapy.  I recommend electrical cardioversion.  He is very familiar with the procedure and understand there is a 1-2% risk associated procedure.  He confirms he has been compliant with Eliquis therapy and has not missed any doses in the last month. He agrees with plan.  We will make arrangements to have cardioversion done.    2.  Embolic prevention.  CHADS-VASc score of 2.  Continue Eliquis indefinitely.   3.  Hypertension.  Blood pressure is well controlled.   4.  Followup care.  We discussed follow-up after cardioversion.        Sreekanth Aguayo MD    Physical Exam:  Vitals: /82   Pulse  "75   Ht 1.651 m (5' 5\")   Wt 81.6 kg (180 lb)   BMI 29.95 kg/m      Constitutional:  AAO x3.  Pt is in NAD.  HEAD: normocephalic.  SKIN: Skin normal color, texture and turgor with no lesions or eruptions.  Eyes: PERRL, EOMI.  ENT:  Supple, normal JVP. No lymphadenopathy or thyroid enlargement.  Chest:  CTAB.  Cardiac:  IIR, variable sound of S1 and Normal S2.  No murmurs rubs or gallop.    Abdomen:  Normal BS.  Soft, non-tender and non-distended.  No rebound or guarding.    Extremities:  Pedious pulses palpable B/L.  No LE edema noticed.   Neurological: Strength and sensation grossly symmetric and intact throughout.       CURRENT MEDICATIONS:  Current Outpatient Medications   Medication Sig Dispense Refill     apixaban ANTICOAGULANT (ELIQUIS ANTICOAGULANT) 5 MG tablet Take 1 tablet (5 mg) by mouth 2 times daily 180 tablet 1     atorvastatin (LIPITOR) 10 MG tablet Take 1 tablet (10 mg) by mouth daily 30 tablet 3     Elastic Bandage MISC 1 each daily. Use it on the right leg as instructed. 1 each 1     Elastic Bandages & Supports (JOBST FOR MEN KNEE HIGH/LG) MISC 1 each daily Use it on the right leg as instructed 1 each 1     flecainide (TAMBOCOR) 100 MG tablet Take 1 tablet (100 mg) by mouth 2 times daily 180 tablet 3     IRON 325 (65 FE) MG OR TABS 1 TABLET DAILY       lisinopril (PRINIVIL/ZESTRIL) 5 MG tablet Take 1 tablet (5 mg) by mouth daily 90 tablet 3     metoprolol succinate ER (TOPROL XL) 25 MG 24 hr tablet Take 1 tablet (25 mg) by mouth daily 90 tablet 3     nitroglycerin (NITROSTAT) 0.4 MG sublingual tablet For chest pain place 1 tablet under the tongue every 5 minutes for 3 doses. If symptoms persist 5 minutes after 1st dose call 911. 25 tablet 0     ORDER FOR DME Equipment being ordered: heel lift- MD 1 Device 0     sildenafil (VIAGRA) 50 MG tablet Take 0.5-1 tablets (25-50 mg) by mouth daily as needed for erectile dysfunction Take 30 min to 4 hours before intercourse.  Never use with nitroglycerin, " terazosin or doxazosin. 36 tablet 0     STATIN NOT PRESCRIBED, INTENTIONAL, by Other route continuous prn.  0       ALLERGIES   No Known Allergies    PAST MEDICAL HISTORY:  Past Medical History:   Diagnosis Date     CKD (chronic kidney disease)      Gastric ulcer      HTN (hypertension), benign      Hyperlipidaemia      Paroxysmal atrial fibrillation (H) 2013       PAST SURGICAL HISTORY:  Past Surgical History:   Procedure Laterality Date     ANESTHESIA CARDIOVERSION N/A 2017    Procedure: ANESTHESIA CARDIOVERSION;  ANESTHESIA NEEDED FOR CARDIOVERSION IN CARE SUITES. (RHETT AT 0830);  Surgeon: GENERIC ANESTHESIA PROVIDER;  Location:  OR     BRONCHOSCOPY       C INCISION OF PYLORIC MUSCLE       CARDIOVERSION  2013, 17     COLONOSCOPY       COLONOSCOPY N/A 2020    Procedure: COLONOSCOPY;  Surgeon: Jarrod Pena MD;  Location:  GI     EXTRACAPSULAR CATARACT EXTRATION WITH INTRAOCULAR LENS IMPLANT      right eye     H ABLATION FOCAL AFIB  2017       FAMILY HISTORY:  Family History   Problem Relation Age of Onset     Cancer Mother      Cancer Father      Colon Cancer Father      Prostate Cancer No family hx of        SOCIAL HISTORY:  Social History     Socioeconomic History     Marital status:      Spouse name: None     Number of children: None     Years of education: None     Highest education level: None   Occupational History     None   Social Needs     Financial resource strain: None     Food insecurity     Worry: None     Inability: None     Transportation needs     Medical: None     Non-medical: None   Tobacco Use     Smoking status: Former Smoker     Years: 38.00     Types: Cigarettes     Quit date: 2012     Years since quittin.5     Smokeless tobacco: Never Used   Substance and Sexual Activity     Alcohol use: Yes     Comment: ocass     Drug use: No     Sexual activity: Yes     Partners: Female   Lifestyle     Physical activity     Days per week: None      Minutes per session: None     Stress: None   Relationships     Social connections     Talks on phone: None     Gets together: None     Attends L[employer] day service: None     Active member of club or organization: None     Attends meetings of clubs or organizations: None     Relationship status: None     Intimate partner violence     Fear of current or ex partner: None     Emotionally abused: None     Physically abused: None     Forced sexual activity: None   Other Topics Concern     Parent/sibling w/ CABG, MI or angioplasty before 65F 55M? No      Service Yes     Blood Transfusions No     Caffeine Concern No     Occupational Exposure No     Hobby Hazards No     Sleep Concern No     Stress Concern No     Weight Concern No     Special Diet No     Back Care No     Exercise Yes     Bike Helmet No     Seat Belt Yes     Self-Exams No   Social History Narrative     None       Review of Systems:  Skin:  Negative rash   Eyes:  Positive for glasses  ENT:  Negative    Respiratory:  Negative shortness of breath;dyspnea on exertion;cough  Cardiovascular:  Negative;palpitations    Gastroenterology: Negative melena;hematochezia  Genitourinary:  Negative dysuria  Musculoskeletal:  Positive for arthritis;joint pain  Neurologic:  Negative speech problems;headaches;local weakness;paralysis;incoordination  Psychiatric:  Negative    Heme/Lymph/Imm:  Negative    Endocrine:  Negative        Recent Lab Results:  LIPID RESULTS:  Lab Results   Component Value Date    CHOL 188 03/13/2017    HDL 46 03/13/2017     03/13/2017    TRIG 112 03/13/2017    CHOLHDLRATIO 4.1 03/13/2017    CHOLHDLRATIO 4.2 05/28/2015       LIVER ENZYME RESULTS:  Lab Results   Component Value Date    AST 25 08/17/2017    ALT 45 08/17/2017       CBC RESULTS:  Lab Results   Component Value Date    WBC 5.4 07/06/2017    RBC 4.11 (L) 07/06/2017    HGB 12.9 (L) 07/06/2017    HCT 37.2 (L) 07/06/2017    MCV 91 07/06/2017    MCH 31.4 07/06/2017    MCHC 34.7  07/06/2017    RDW 12.4 07/06/2017     07/06/2017       BMP RESULTS:  Lab Results   Component Value Date     07/06/2017    POTASSIUM 4.3 08/22/2017    CHLORIDE 109 07/06/2017    CO2 22 07/06/2017    ANIONGAP 8 07/06/2017    GLC 95 07/06/2017    BUN 17 07/06/2017    CR 0.94 07/06/2017    GFRESTIMATED 80 07/06/2017    GFRESTBLACK >90   GFR Calc   07/06/2017    CHUCKY 8.6 07/06/2017        A1C RESULTS:  Lab Results   Component Value Date    A1C 5.5 03/13/2017       INR RESULTS:  Lab Results   Component Value Date    INR 1.13 07/06/2017    INR 1.18 (H) 06/05/2017         ECHOCARDIOGRAM  No results found for this or any previous visit (from the past 8760 hour(s)).      Orders Placed This Encounter   Procedures     EKG 12-lead complete w/read - Clinics (future- to be scheduled)     No orders of the defined types were placed in this encounter.    There are no discontinued medications.      Encounter Diagnosis   Name Primary?     Atrial fibrillation (H) Yes         Thank you for allowing me to participate in the care of your patient.    Sincerely,     Sreekanth Aguayo MD     [facility]

## 2021-02-09 NOTE — LETTER
2/9/2021    MIKE DEMPSEY  [facility] [address]  [facility] [address]    RE: Charlene Bahepi       Dear Colleague,    I had the pleasure of seeing Charlene Gutierrez in the [facility].    Electrophysiology/ Clinic Note         H&P and Plan:     REASON FOR VISIT: Electrophysiology evaluation.      HISTORY OF PRESENT ILLNESS: Mr. Gutierrez is a pleasant 75-year-old gentleman with history of hypertension, hyperlipidemia and recurrent persistent AFib (pulmonary vein isolation on 07/06/2017), who is here today for routine followup.     Patient has been on chronic therapy with flecainide.  He failed therapy with flecainide and underwent A. fib ablation 2017.  He did well after ablation.  However, 4 days ago he started having episodes of palpitation.  He contacted our office in a scheduled visit.    He denies any other symptoms such as chest pain, shortness of breath, near-syncope or syncopal episode.      EKG  today showed atrial fibrillation with good heart rate control.       PREVIOUS STUDIES:   -Stress echo (02/22/2018): Negative for ischemia.  Normal cardiac function and no EKG abnormalities.      ASSESSMENT AND PLAN:   1.  Recurrent persistent AFib.    He did well after ablation.  However, he presents with recurrence of A. fib despite of flecainide therapy.  I recommend electrical cardioversion.  He is very familiar with the procedure and understand there is a 1-2% risk associated procedure.  He confirms he has been compliant with Eliquis therapy and has not missed any doses in the last month. He agrees with plan.  We will make arrangements to have cardioversion done.    2.  Embolic prevention.  CHADS-VASc score of 2.  Continue Eliquis indefinitely.   3.  Hypertension.  Blood pressure is well controlled.   4.  Followup care.  We discussed follow-up after cardioversion.        Sreekanth Aguayo MD    Physical Exam:  Vitals: /82   Pulse  "75   Ht 1.651 m (5' 5\")   Wt 81.6 kg (180 lb)   BMI 29.95 kg/m      Constitutional:  AAO x3.  Pt is in NAD.  HEAD: normocephalic.  SKIN: Skin normal color, texture and turgor with no lesions or eruptions.  Eyes: PERRL, EOMI.  ENT:  Supple, normal JVP. No lymphadenopathy or thyroid enlargement.  Chest:  CTAB.  Cardiac:  IIR, variable sound of S1 and Normal S2.  No murmurs rubs or gallop.    Abdomen:  Normal BS.  Soft, non-tender and non-distended.  No rebound or guarding.    Extremities:  Pedious pulses palpable B/L.  No LE edema noticed.   Neurological: Strength and sensation grossly symmetric and intact throughout.       CURRENT MEDICATIONS:  Current Outpatient Medications   Medication Sig Dispense Refill     apixaban ANTICOAGULANT (ELIQUIS ANTICOAGULANT) 5 MG tablet Take 1 tablet (5 mg) by mouth 2 times daily 180 tablet 1     atorvastatin (LIPITOR) 10 MG tablet Take 1 tablet (10 mg) by mouth daily 30 tablet 3     Elastic Bandage MISC 1 each daily. Use it on the right leg as instructed. 1 each 1     Elastic Bandages & Supports (JOBST FOR MEN KNEE HIGH/LG) MISC 1 each daily Use it on the right leg as instructed 1 each 1     flecainide (TAMBOCOR) 100 MG tablet Take 1 tablet (100 mg) by mouth 2 times daily 180 tablet 3     IRON 325 (65 FE) MG OR TABS 1 TABLET DAILY       lisinopril (PRINIVIL/ZESTRIL) 5 MG tablet Take 1 tablet (5 mg) by mouth daily 90 tablet 3     metoprolol succinate ER (TOPROL XL) 25 MG 24 hr tablet Take 1 tablet (25 mg) by mouth daily 90 tablet 3     nitroglycerin (NITROSTAT) 0.4 MG sublingual tablet For chest pain place 1 tablet under the tongue every 5 minutes for 3 doses. If symptoms persist 5 minutes after 1st dose call 911. 25 tablet 0     ORDER FOR DME Equipment being ordered: heel lift- MD 1 Device 0     sildenafil (VIAGRA) 50 MG tablet Take 0.5-1 tablets (25-50 mg) by mouth daily as needed for erectile dysfunction Take 30 min to 4 hours before intercourse.  Never use with nitroglycerin, " terazosin or doxazosin. 36 tablet 0     STATIN NOT PRESCRIBED, INTENTIONAL, by Other route continuous prn.  0       ALLERGIES   No Known Allergies    PAST MEDICAL HISTORY:  Past Medical History:   Diagnosis Date     CKD (chronic kidney disease)      Gastric ulcer      HTN (hypertension), benign      Hyperlipidaemia      Paroxysmal atrial fibrillation (H) 2013       PAST SURGICAL HISTORY:  Past Surgical History:   Procedure Laterality Date     ANESTHESIA CARDIOVERSION N/A 2017    Procedure: ANESTHESIA CARDIOVERSION;  ANESTHESIA NEEDED FOR CARDIOVERSION IN CARE SUITES. (RHETT AT 0830);  Surgeon: GENERIC ANESTHESIA PROVIDER;  Location:  OR     BRONCHOSCOPY       C INCISION OF PYLORIC MUSCLE       CARDIOVERSION  2013, 17     COLONOSCOPY       COLONOSCOPY N/A 2020    Procedure: COLONOSCOPY;  Surgeon: Jarrod Pena MD;  Location:  GI     EXTRACAPSULAR CATARACT EXTRATION WITH INTRAOCULAR LENS IMPLANT      right eye     H ABLATION FOCAL AFIB  2017       FAMILY HISTORY:  Family History   Problem Relation Age of Onset     Cancer Mother      Cancer Father      Colon Cancer Father      Prostate Cancer No family hx of        SOCIAL HISTORY:  Social History     Socioeconomic History     Marital status:      Spouse name: None     Number of children: None     Years of education: None     Highest education level: None   Occupational History     None   Social Needs     Financial resource strain: None     Food insecurity     Worry: None     Inability: None     Transportation needs     Medical: None     Non-medical: None   Tobacco Use     Smoking status: Former Smoker     Years: 38.00     Types: Cigarettes     Quit date: 2012     Years since quittin.5     Smokeless tobacco: Never Used   Substance and Sexual Activity     Alcohol use: Yes     Comment: ocass     Drug use: No     Sexual activity: Yes     Partners: Female   Lifestyle     Physical activity     Days per week: None      Minutes per session: None     Stress: None   Relationships     Social connections     Talks on phone: None     Gets together: None     Attends [employer] service: None     Active member of club or organization: None     Attends meetings of clubs or organizations: None     Relationship status: None     Intimate partner violence     Fear of current or ex partner: None     Emotionally abused: None     Physically abused: None     Forced sexual activity: None   Other Topics Concern     Parent/sibling w/ CABG, MI or angioplasty before 65F 55M? No      Service Yes     Blood Transfusions No     Caffeine Concern No     Occupational Exposure No     Hobby Hazards No     Sleep Concern No     Stress Concern No     Weight Concern No     Special Diet No     Back Care No     Exercise Yes     Bike Helmet No     Seat Belt Yes     Self-Exams No   Social History Narrative     None       Review of Systems:  Skin:  Negative rash   Eyes:  Positive for glasses  ENT:  Negative    Respiratory:  Negative shortness of breath;dyspnea on exertion;cough  Cardiovascular:  Negative;palpitations    Gastroenterology: Negative melena;hematochezia  Genitourinary:  Negative dysuria  Musculoskeletal:  Positive for arthritis;joint pain  Neurologic:  Negative speech problems;headaches;local weakness;paralysis;incoordination  Psychiatric:  Negative    Heme/Lymph/Imm:  Negative    Endocrine:  Negative        Recent Lab Results:  LIPID RESULTS:  Lab Results   Component Value Date    CHOL 188 03/13/2017    HDL 46 03/13/2017     03/13/2017    TRIG 112 03/13/2017    CHOLHDLRATIO 4.1 03/13/2017    CHOLHDLRATIO 4.2 05/28/2015       LIVER ENZYME RESULTS:  Lab Results   Component Value Date    AST 25 08/17/2017    ALT 45 08/17/2017       CBC RESULTS:  Lab Results   Component Value Date    WBC 5.4 07/06/2017    RBC 4.11 (L) 07/06/2017    HGB 12.9 (L) 07/06/2017    HCT 37.2 (L) 07/06/2017    MCV 91 07/06/2017    MCH 31.4 07/06/2017    MCHC 34.7  07/06/2017    RDW 12.4 07/06/2017     07/06/2017       BMP RESULTS:  Lab Results   Component Value Date     07/06/2017    POTASSIUM 4.3 08/22/2017    CHLORIDE 109 07/06/2017    CO2 22 07/06/2017    ANIONGAP 8 07/06/2017    GLC 95 07/06/2017    BUN 17 07/06/2017    CR 0.94 07/06/2017    GFRESTIMATED 80 07/06/2017    GFRESTBLACK >90   GFR Calc   07/06/2017    CHUCKY 8.6 07/06/2017        A1C RESULTS:  Lab Results   Component Value Date    A1C 5.5 03/13/2017       INR RESULTS:  Lab Results   Component Value Date    INR 1.13 07/06/2017    INR 1.18 (H) 06/05/2017         ECHOCARDIOGRAM  No results found for this or any previous visit (from the past 8760 hour(s)).      Orders Placed This Encounter   Procedures     EKG 12-lead complete w/read - Clinics (future- to be scheduled)     No orders of the defined types were placed in this encounter.    There are no discontinued medications.      Encounter Diagnosis   Name Primary?     Atrial fibrillation (H) Yes         CC  No referring provider defined for this encounter.                  Thank you for allowing me to participate in the care of your patient.      Sincerely,     Sreekanth Aguayo MD     [facility]  cc:   No referring provider defined for this encounter.

## 2021-02-10 DIAGNOSIS — Z11.59 ENCOUNTER FOR SCREENING FOR OTHER VIRAL DISEASES: ICD-10-CM

## 2021-02-12 DIAGNOSIS — Z11.59 ENCOUNTER FOR SCREENING FOR OTHER VIRAL DISEASES: ICD-10-CM

## 2021-02-12 LAB
LABORATORY COMMENT REPORT: NORMAL
SARS-COV-2 RNA RESP QL NAA+PROBE: NEGATIVE
SARS-COV-2 RNA RESP QL NAA+PROBE: NORMAL
SPECIMEN SOURCE: NORMAL
SPECIMEN SOURCE: NORMAL

## 2021-02-12 PROCEDURE — U0003 INFECTIOUS AGENT DETECTION BY NUCLEIC ACID (DNA OR RNA); SEVERE ACUTE RESPIRATORY SYNDROME CORONAVIRUS 2 (SARS-COV-2) (CORONAVIRUS DISEASE [COVID-19]), AMPLIFIED PROBE TECHNIQUE, MAKING USE OF HIGH THROUGHPUT TECHNOLOGIES AS DESCRIBED BY CMS-2020-01-R: HCPCS | Performed by: INTERNAL MEDICINE

## 2021-02-12 PROCEDURE — 99207 PR NO CHARGE LOS: CPT

## 2021-02-12 PROCEDURE — U0005 INFEC AGEN DETEC AMPLI PROBE: HCPCS | Performed by: INTERNAL MEDICINE

## 2021-02-15 ENCOUNTER — ANESTHESIA (OUTPATIENT)
Dept: MEDSURG UNIT | Facility: CLINIC | Age: 76
End: 2021-02-15
Payer: COMMERCIAL

## 2021-02-15 ENCOUNTER — HOSPITAL ENCOUNTER (OUTPATIENT)
Dept: MEDSURG UNIT | Facility: CLINIC | Age: 76
End: 2021-02-15
Attending: INTERNAL MEDICINE
Payer: COMMERCIAL

## 2021-02-15 ENCOUNTER — ANESTHESIA EVENT (OUTPATIENT)
Dept: MEDSURG UNIT | Facility: CLINIC | Age: 76
End: 2021-02-15
Payer: COMMERCIAL

## 2021-02-15 ENCOUNTER — HOSPITAL ENCOUNTER (OUTPATIENT)
Facility: CLINIC | Age: 76
Discharge: HOME | End: 2021-02-15
Admitting: INTERNAL MEDICINE
Payer: COMMERCIAL

## 2021-02-15 VITALS
TEMPERATURE: 98.1 F | DIASTOLIC BLOOD PRESSURE: 77 MMHG | OXYGEN SATURATION: 97 % | HEART RATE: 87 BPM | RESPIRATION RATE: 15 BRPM | SYSTOLIC BLOOD PRESSURE: 104 MMHG

## 2021-02-15 DIAGNOSIS — I48.19 PERSISTENT ATRIAL FIBRILLATION (H): ICD-10-CM

## 2021-02-15 LAB
MAGNESIUM SERPL-MCNC: 2 MG/DL (ref 1.6–2.3)
POTASSIUM SERPL-SCNC: 4.2 MMOL/L (ref 3.4–5.3)

## 2021-02-15 PROCEDURE — 999N000184 HC STATISTIC TELEMETRY

## 2021-02-15 PROCEDURE — 999N000011 HC STATISTIC ANESTHESIA CASE

## 2021-02-15 PROCEDURE — 250N000011 HC RX IP 250 OP 636: Performed by: NURSE ANESTHETIST, CERTIFIED REGISTERED

## 2021-02-15 PROCEDURE — 93005 ELECTROCARDIOGRAM TRACING: CPT | Mod: 59

## 2021-02-15 PROCEDURE — 83735 ASSAY OF MAGNESIUM: CPT

## 2021-02-15 PROCEDURE — 92960 CARDIOVERSION ELECTRIC EXT: CPT

## 2021-02-15 PROCEDURE — 84132 ASSAY OF SERUM POTASSIUM: CPT

## 2021-02-15 PROCEDURE — 36415 COLL VENOUS BLD VENIPUNCTURE: CPT

## 2021-02-15 PROCEDURE — 93010 ELECTROCARDIOGRAM REPORT: CPT | Mod: 76 | Performed by: INTERNAL MEDICINE

## 2021-02-15 RX ORDER — NALOXONE HYDROCHLORIDE 0.4 MG/ML
0.4 INJECTION, SOLUTION INTRAMUSCULAR; INTRAVENOUS; SUBCUTANEOUS
Status: DISCONTINUED | OUTPATIENT
Start: 2021-02-15 | End: 2021-02-15 | Stop reason: HOSPADM

## 2021-02-15 RX ORDER — POTASSIUM CHLORIDE 1500 MG/1
20 TABLET, EXTENDED RELEASE ORAL
Status: DISCONTINUED | OUTPATIENT
Start: 2021-02-15 | End: 2021-02-15 | Stop reason: HOSPADM

## 2021-02-15 RX ORDER — PROPOFOL 10 MG/ML
INJECTION, EMULSION INTRAVENOUS PRN
Status: DISCONTINUED | OUTPATIENT
Start: 2021-02-15 | End: 2021-02-15

## 2021-02-15 RX ORDER — NALOXONE HYDROCHLORIDE 0.4 MG/ML
0.2 INJECTION, SOLUTION INTRAMUSCULAR; INTRAVENOUS; SUBCUTANEOUS
Status: DISCONTINUED | OUTPATIENT
Start: 2021-02-15 | End: 2021-02-15 | Stop reason: HOSPADM

## 2021-02-15 RX ORDER — SODIUM CHLORIDE 9 MG/ML
INJECTION, SOLUTION INTRAVENOUS CONTINUOUS
Status: DISCONTINUED | OUTPATIENT
Start: 2021-02-15 | End: 2021-02-15 | Stop reason: HOSPADM

## 2021-02-15 RX ORDER — MAGNESIUM SULFATE HEPTAHYDRATE 40 MG/ML
2 INJECTION, SOLUTION INTRAVENOUS
Status: DISCONTINUED | OUTPATIENT
Start: 2021-02-15 | End: 2021-02-15 | Stop reason: HOSPADM

## 2021-02-15 RX ORDER — ATROPINE SULFATE 0.1 MG/ML
.5-1 INJECTION INTRAVENOUS
Status: DISCONTINUED | OUTPATIENT
Start: 2021-02-15 | End: 2021-02-15 | Stop reason: HOSPADM

## 2021-02-15 RX ORDER — FLUMAZENIL 0.1 MG/ML
0.2 INJECTION, SOLUTION INTRAVENOUS
Status: DISCONTINUED | OUTPATIENT
Start: 2021-02-15 | End: 2021-02-15 | Stop reason: HOSPADM

## 2021-02-15 RX ORDER — POTASSIUM CHLORIDE 1500 MG/1
40 TABLET, EXTENDED RELEASE ORAL
Status: DISCONTINUED | OUTPATIENT
Start: 2021-02-15 | End: 2021-02-15 | Stop reason: HOSPADM

## 2021-02-15 RX ADMIN — PROPOFOL 80 MG: 10 INJECTION, EMULSION INTRAVENOUS at 10:30

## 2021-02-15 ASSESSMENT — ENCOUNTER SYMPTOMS: DYSRHYTHMIAS: 1

## 2021-02-15 NOTE — DISCHARGE INSTRUCTIONS
Cardioversion Discharge Instructions    After you go home:       For 24 hours - due to the sedation you received:      Have an adult stay with you for 24 hours.     Relax and take it easy.    Do NOT make any important or legal decisions.    Do NOT drive or operate machines at home or at work.    Do NOT drink alcohol.    Diet:      Start with clear liquids and progress to your normal diet as you feel able.    Medicines:      Take your medications, including blood thinners, unless your provider tells you not to.    If you have stopped any medications, check with your provider about when to restart them.    Follow Up Appointments:      Follow up with your cardiologist at [facility] of patient preference as instructed.    Follow up with your primary care provider as needed.    Post cardioversion:    The skin on your chest or back may feel tender for 48 hours.  If your skin is tender, you may:      Use a cold pack on the site. Never use ice directly on your skin. Use the cold pack for 20 minutes. Remove it for at least 30 minutes before re-using.    Apply 1% hydrocortisone cream to the skin (sold at drug stores)    Take Advil (Ibuprofen) or Tylenol (Acetaminophen) per your provider's recommendations.      Call your provider if you have:      Weakness, dizziness, lightheadedness, or fainting.    Shortness of breath.    Irregular heartbeat, feelings of your heart fluttering or beating fast, hard or palpitations.     More than minor skin discomfort or redness where the cardioversion pads were placed.    Questions or concerns.      Call 911 if you have:      Pain in your chest, arm, shoulder, neck, or upper back.    You have problems speaking or seeing.    Weakness in your arm or leg.    You are unable to move your arm or leg.    You have uncontrolled bleeding.         [facility] [place]:    [phone] [facility] (7 days a week)

## 2021-02-15 NOTE — PROGRESS NOTES
Care Suites Procedure Nursing Note    Patient Information  Name: Charlene Gutierrez  Age: 75 year old    Procedure  Procedure: Cardioversion  Procedure start time: 0815  Procedure complete time: 1125  Concerns/abnormal assessment: No  If abnormal assessment, provider notified: N/A  Plan/Other: recover than discharge    Care Suites Discharge Nursing Note    Patient Information  Name: Charlene Gutierrez  Age: 75 year old    Discharge Education:  Discharge instructions reviewed: Yes  Additional education/resources provided: No  Patient/patient representative verbalizes understanding: Yes  Patient discharging on new medications: No  Medication education completed: N/A    Discharge Plans:   Discharge location: home  Discharge ride contacted: Yes  Approximate discharge time: 1125    Discharge Criteria:  Discharge criteria met and vital signs stable: Yes    Patient Belongs:  Patient belongings returned to patient: Yes    Krystina Walker RN

## 2021-02-15 NOTE — ANESTHESIA POSTPROCEDURE EVALUATION
Patient: Charlene Gutierrez    * No procedures listed *    Diagnosis:* No pre-op diagnosis entered *  Diagnosis Additional Information: No value filed.    Anesthesia Type:  General    Note:  Disposition: Outpatient   Postop Pain Control: Uneventful            Sign Out: Well controlled pain   PONV: No   Neuro/Psych: Uneventful            Sign Out: Acceptable/Baseline neuro status   Airway/Respiratory: Uneventful            Sign Out: Acceptable/Baseline resp. status   CV/Hemodynamics: Uneventful            Sign Out: Acceptable CV status   Other NRE: NONE   DID A NON-ROUTINE EVENT OCCUR? No         Last vitals:  There were no vitals filed for this visit.    Last vitals prior to Anesthesia Care Transfer:  CRNA VITALS  2/15/2021 1011 - 2/15/2021 1057      2/15/2021             NIBP:  118/75    NIBP Mean:  87    Ht Rate:  84          Electronically Signed By: AYAKA NOLASCO MD  February 15, 2021  10:57 AM

## 2021-02-15 NOTE — PROGRESS NOTES
Care Suites Admission Nursing Note    Patient Information  Name: Charlene Gutierrez  Age: 75 year old  Reason for admission: Cardioversion  Care Suites arrival time: 0800    Patient Admission/Assessment   Pre-procedure assessment complete: Yes  If abnormal assessment/labs, provider notified: N/A  NPO: Yes  Medications held per instructions/orders: Yes  Consent: obtained  Patient oriented to room: Yes  Education/questions answered: Yes  Plan/other: discharge to home after recovery from sedation    Discharge Planning  Discharge name/phone number: Emma [phone]  Overnight post sedation caregiver: same  Discharge location: home    PATIENT/VISITOR WELLNESS SCREENING    Step 1 Patient Screening    1. In the last month, have you been in contact with someone who was confirmed or suspected to have Coronavirus/COVID-19? No    2. Do you have the following symptoms?  Fever/Chills? No   Cough? No   Shortness of breath? No   New loss of taste or smell? No  Sore throat? No  Muscle or body aches? No  Headaches? No  Fatigue? No  Vomiting or diarrhea? No    Krystina Walker RN

## 2021-02-15 NOTE — OP NOTE
Procedure Date: 02/15/2021      CARDIOVERSION NOTE      This is a gentleman with recurrent persistent atrial fibrillation.  After anesthesia was expertly administered, a single synchronized shock of 120 joules restored normal sinus rhythm.  The patient tolerated the procedure well.         GUDELIA ENGLAND MD, [facility]             D: 02/15/2021   T: 02/15/2021   MT: [id number]      Name:     ISIAH MERINO   MRN:      [id number]        Account:        [id number]   :      1945           Procedure Date: 02/15/2021      Document: [id number]

## 2021-02-15 NOTE — ANESTHESIA PREPROCEDURE EVALUATION
Anesthesia Pre-Procedure Evaluation    Patient: Charlene Gutierrez   MRN: [id number] : 1945        Preoperative Diagnosis: * No surgery found *   Procedure :      Past Medical History:   Diagnosis Date     CKD (chronic kidney disease)      Gastric ulcer      HTN (hypertension), benign      Hyperlipidaemia      Paroxysmal atrial fibrillation (H) 2013      Past Surgical History:   Procedure Laterality Date     ANESTHESIA CARDIOVERSION N/A 2017    Procedure: ANESTHESIA CARDIOVERSION;  ANESTHESIA NEEDED FOR CARDIOVERSION IN CARE SUITES. (RHETT AT 0830);  Surgeon: GENERIC ANESTHESIA PROVIDER;  Location:  OR     BRONCHOSCOPY       C INCISION OF PYLORIC MUSCLE       CARDIOVERSION  2013, 17     COLONOSCOPY       COLONOSCOPY N/A 2020    Procedure: COLONOSCOPY;  Surgeon: Jarrod Pena MD;  Location:  GI     EXTRACAPSULAR CATARACT EXTRATION WITH INTRAOCULAR LENS IMPLANT      right eye     H ABLATION FOCAL AFIB  2017      No Known Allergies   Social History     Tobacco Use     Smoking status: Former Smoker     Years: 38.00     Types: Cigarettes     Quit date: 2012     Years since quittin.5     Smokeless tobacco: Never Used   Substance Use Topics     Alcohol use: Yes     Comment: ocass      Wt Readings from Last 1 Encounters:   21 81.6 kg (180 lb)        Anesthesia Evaluation            ROS/MED HX  ENT/Pulmonary:    (-) sleep apnea   Neurologic:       Cardiovascular:     (+) Dyslipidemia hypertension-----dysrhythmias, a-fib,     METS/Exercise Tolerance:     Hematologic:       Musculoskeletal:       GI/Hepatic:    (-) GERD   Renal/Genitourinary:     (+) renal disease, type: CRI,     Endo:       Psychiatric/Substance Use:       Infectious Disease:       Malignancy:       Other:            Physical Exam    Airway        Mallampati: II   TM distance: > 3 FB   Neck ROM: full   Mouth opening: > 3 cm    Respiratory Devices and Support         Dental  no notable dental  history         Cardiovascular          Rhythm and rate: irregular     Pulmonary   pulmonary exam normal        breath sounds clear to auscultation           OUTSIDE LABS:  CBC:   Lab Results   Component Value Date    WBC 5.4 07/06/2017    WBC 7.1 03/13/2017    HGB 12.9 (L) 07/06/2017    HGB 13.6 03/13/2017    HCT 37.2 (L) 07/06/2017    HCT 40.7 03/13/2017     07/06/2017     03/13/2017     BMP:   Lab Results   Component Value Date     07/06/2017     03/13/2017    POTASSIUM 4.2 02/15/2021    POTASSIUM 4.3 08/22/2017    CHLORIDE 109 07/06/2017    CHLORIDE 108 03/13/2017    CO2 22 07/06/2017    CO2 25 03/13/2017    BUN 17 07/06/2017    BUN 23 03/13/2017    CR 0.94 07/06/2017    CR 1.12 03/13/2017    GLC 95 07/06/2017     03/13/2017     COAGS:   Lab Results   Component Value Date    PTT 61 (H) 07/06/2017    INR 1.13 07/06/2017     POC: No results found for: BGM, HCG, HCGS  HEPATIC:   Lab Results   Component Value Date    ALBUMIN 3.5 08/17/2017    PROTTOTAL 7.1 08/17/2017    ALT 45 08/17/2017    AST 25 08/17/2017    ALKPHOS 80 08/17/2017    BILITOTAL 0.7 08/17/2017     OTHER:   Lab Results   Component Value Date    A1C 5.5 03/13/2017    CHUCKY 8.6 07/06/2017    PHOS 3.2 02/25/2011    MAG 2.0 02/15/2021    TSH 1.92 08/17/2017       Anesthesia Plan    ASA Status:  3   NPO Status:  NPO Appropriate    Anesthesia Type: General.              Consents    Anesthesia Plan(s) and associated risks, benefits, and realistic alternatives discussed. Questions answered and patient/representative(s) expressed understanding.     - Discussed with:  Patient         Postoperative Care            Comments:                AYAKA NOLASCO MD

## 2021-02-15 NOTE — PRE-PROCEDURE
GENERAL PRE-PROCEDURE:   Procedure:  Dccv  Date/Time:  2/15/2021 10:19 AM    Risks and benefits: Risks, benefits and alternatives were discussed    Consent given by:  Patient  Patient states understanding of procedure being performed: Yes    Patient's understanding of procedure matches consent: Yes    Procedure consent matches procedure scheduled: Yes    Appropriately NPO:  Yes  ASA Class:  Class 2- mild systemic disease, no acute problems, no functional limitations  Mallampati  :  Grade 2- soft palate, base of uvula, tonsillar pillars, and portion of posterior pharyngeal wall visible  Lungs:  Lungs clear with good breath sounds bilaterally  Heart:  Normal heart sounds and rate and a-fib  History & Physical reviewed:  History and physical reviewed and no updates needed  Statement of review:  I have reviewed the lab findings, diagnostic data, medications, and the plan for sedation

## 2021-02-15 NOTE — ANESTHESIA CARE TRANSFER NOTE
Patient: Charlene Gutierrez    * No procedures listed *    Diagnosis: * No pre-op diagnosis entered *  Diagnosis Additional Information: No value filed.    Anesthesia Type:   General     Note:    Oropharynx: oropharynx clear of all foreign objects and spontaneously breathing  Level of Consciousness: awake  Oxygen Supplementation: nasal cannula  Level of Supplemental Oxygen (L/min / FiO2): 2  Independent Airway: airway patency satisfactory and stable  Dentition: dentition unchanged  Vital Signs Stable: post-procedure vital signs reviewed and stable  Report to RN Given: handoff report given  Destination: [facility].  Comments: Pt exhibits spontaneous respirations, all monitors and alarms on in Care [facility], VSS, patent IV, report and transfer of care to RN.          Vitals: (Last set prior to Anesthesia Care Transfer)  CRNA VITALS  2/15/2021 1011 - 2/15/2021 1057      2/15/2021             NIBP:  118/75    NIBP Mean:  87    Ht Rate:  84        Electronically Signed By: JOSÉ MIGUEL Cintron CRNA  February 15, 2021  10:57 AM

## 2021-02-16 ENCOUNTER — TELEPHONE (OUTPATIENT)
Dept: CARDIOLOGY | Facility: CLINIC | Age: 76
End: 2021-02-16

## 2021-02-16 DIAGNOSIS — I48.19 PERSISTENT ATRIAL FIBRILLATION (H): Primary | ICD-10-CM

## 2021-02-16 LAB
INTERPRETATION ECG - MUSE: NORMAL
INTERPRETATION ECG - MUSE: NORMAL

## 2021-02-16 NOTE — TELEPHONE ENCOUNTER
He should follow-up with an RINKU in 3-4 months to make sure A. fib burden is well controlled.     Thank you.     Sreekanth Aguayo MD

## 2021-02-16 NOTE — TELEPHONE ENCOUNTER
Spoke to patient in follow up to successful cardioversion 2/15.  Patient reports feeling well.  Informed patient that I will update Dr Aguayo and identify when he wants him to have follow up.  Patient provided verbal understanding.  REJI Garibay

## 2021-04-17 ENCOUNTER — HEALTH MAINTENANCE LETTER (OUTPATIENT)
Age: 76
End: 2021-04-17

## 2021-05-16 NOTE — PROGRESS NOTES
"[facility]    I had the pleasure of seeing Charlene when he came for follow up of recent DCCV.  This 75 year old sees Dr. Aguayo for his history of:    1. HTN  2. Dyslipidemia - hasn't tolerated simvastatin, atorvastatin  3. Recurrent persistent AFib - s/p PVI/CTI ablation 7/6/2017 after failing chronic flecainide. Required DCCV/amiodarone 8/2017. Switched back to flecainide 11/2017 and did well until 2/2021, back in AFib and repeat DCCV 2/2021    Dr. Aguayo saw Charlene 2/2021 when he called d/t 4 day h/o recurrent palpitations. He was set up for DCCV, which was done 2/15/2021. 3-4 m follow-up rec'd.     Interval History:  No recurrence of prolonged palpitations or atrial fibrillation.  Remains on Eliquis and no bleeding issues that he's aware of    He ended up stopping atorvastatin d/t increased belching. Since stopping atorvastatin, this improved. In the past, he's also had weight gain, forearm aching with this.  He's now tried the atorvastatin twice and has had odd side effects both times.  Simvastatin was stopped many years ago d/t increasing bruising. No h/o stents or known CAD.    BPs at home 130s, similar to what I got when I rechecked    VITALS:  Vitals: /78   Pulse 62   Ht 1.651 m (5' 5\")   Wt 78.9 kg (174 lb)   BMI 28.96 kg/m      Diagnostic Testing:  EKG today, which I overread, showed SR 64 bpm. QRS duration on flecainide 104 ms  Exercise Stress Echocardiogram 2/2018 - nl  RHETT 6/2017 showed EF 55-60%    Plan:  1. Dietary/exercise changes for lipid    Assessment/Plan:    1. Recurrent persistent AFib    As above, required DCCV 8/2017 and again 2/2021 following PVI/CTI ablation 7/2017    Remains on AC with Eliquis 5 mg BID for CHADSVASc 2 (HTN, age)    No recurrence and today's EKG is acceptable    PLAN:    Continue flecainide and Eliquis    2. Hyperlipidemia    Was on simvastatin many years ago, atorvastatin has now been tried twice and stopped d/t atypical ADRs. These did largely " "improve after stopping the atorvastatin, and frequency increased after restarting it    10-year Risk is 27% according to ASCVD risk score (using today's BP and lipids from 3/2021). No lifetime risk calculated as >61 y/o    PLAN:    Agreed to try TLC x 6 months and repeat. Increasing fiber, increased exercise, etc (see AVS). Follows a \"pretty Mediterranean Diet\" already  Will see me back 6 months with fasting lipids. Noted that at these levels, could reduce risk x 7% (to 20.3%) with statin therapy      Symone Luna PA-C, MSPAS      Orders Placed This Encounter   Procedures     Lipid Profile     Follow-Up with Cardiac Advanced Practice Provider     EKG 12-lead complete w/read - Clinics (performed today)     No orders of the defined types were placed in this encounter.    Medications Discontinued During This Encounter   Medication Reason     atorvastatin (LIPITOR) 10 MG tablet Stopped by Patient     STATIN NOT PRESCRIBED, INTENTIONAL,          Encounter Diagnoses   Name Primary?     Persistent atrial fibrillation (H)      Dyslipidemia Yes       CURRENT MEDICATIONS:  Current Outpatient Medications   Medication Sig Dispense Refill     apixaban ANTICOAGULANT (ELIQUIS ANTICOAGULANT) 5 MG tablet Take 1 tablet (5 mg) by mouth 2 times daily 180 tablet 1     flecainide (TAMBOCOR) 100 MG tablet Take 1 tablet (100 mg) by mouth 2 times daily 180 tablet 3     IRON 325 (65 FE) MG OR TABS 1 TABLET DAILY       lisinopril (PRINIVIL/ZESTRIL) 5 MG tablet Take 1 tablet (5 mg) by mouth daily 90 tablet 3     metoprolol succinate ER (TOPROL XL) 25 MG 24 hr tablet Take 1 tablet (25 mg) by mouth daily 90 tablet 3     sildenafil (VIAGRA) 50 MG tablet Take 0.5-1 tablets (25-50 mg) by mouth daily as needed for erectile dysfunction Take 30 min to 4 hours before intercourse.  Never use with nitroglycerin, terazosin or doxazosin. 36 tablet 0     Elastic Bandage MISC 1 each daily. Use it on the right leg as instructed. 1 each 1     Elastic Bandages & " "Supports (JOBST FOR MEN KNEE HIGH/LG) MISC 1 each daily Use it on the right leg as instructed 1 each 1     nitroglycerin (NITROSTAT) 0.4 MG sublingual tablet For chest pain place 1 tablet under the tongue every 5 minutes for 3 doses. If symptoms persist 5 minutes after 1st dose call 911. 25 tablet 0     ORDER FOR DME Equipment being ordered: heel lift- MD 1 Device 0       ALLERGIES   No Known Allergies      Review of Systems:  Skin:  Negative rash   Eyes:  Positive for glasses  ENT:  Negative    Respiratory:  Negative shortness of breath;dyspnea on exertion;cough  Cardiovascular:  Negative;palpitations    Gastroenterology: Negative melena;hematochezia  Genitourinary:  Negative dysuria  Musculoskeletal:  Positive for arthritis;joint pain  Neurologic:  Negative speech problems;headaches;local weakness;paralysis;incoordination  Psychiatric:  Negative    Heme/Lymph/Imm:  Negative    Endocrine:  Negative      Physical Exam:  Vitals: /78   Pulse 62   Ht 1.651 m (5' 5\")   Wt 78.9 kg (174 lb)   BMI 28.96 kg/m      Constitutional:  cooperative, alert and oriented, well developed, well nourished, in no acute distress        Skin:  warm and dry to the touch, no apparent skin lesions or masses noted        Head:  normocephalic, no masses or lesions        Eyes:  conjunctivae and lids unremarkable;sclera white;no xanthalasma        ENT:  not assessed this visit        Neck:  not assessed this visit        Chest:  normal breath sounds, clear to auscultation, normal A-P diameter, normal symmetry, normal respiratory excursion, no use of accessory muscles        Cardiac: no murmurs, gallops or rubs detected;regular rhythm                  Abdomen:  abdomen soft        Vascular: pulses full and equal                                 no tenderness     Extremities and Back:  no deformities, clubbing, cyanosis, erythema observed;no edema        Neurological:  no gross motor deficits            PAST MEDICAL HISTORY:  Past " Medical History:   Diagnosis Date     CKD (chronic kidney disease)      Gastric ulcer      HTN (hypertension), benign      Hyperlipidaemia      Paroxysmal atrial fibrillation (H) 2013       PAST SURGICAL HISTORY:  Past Surgical History:   Procedure Laterality Date     ANESTHESIA CARDIOVERSION N/A 2017    Procedure: ANESTHESIA CARDIOVERSION;  ANESTHESIA NEEDED FOR CARDIOVERSION IN CARE SUITES. (RHETT AT 0830);  Surgeon: GENERIC ANESTHESIA PROVIDER;  Location:  OR     ANESTHESIA CARDIOVERSION N/A 2/15/2021    Procedure: ANESTHESIA, FOR CARDIOVERSION;  Surgeon: GENERIC ANESTHESIA PROVIDER;  Location:  OR     BRONCHOSCOPY       C INCISION OF PYLORIC MUSCLE       CARDIOVERSION  2013, 17     COLONOSCOPY       COLONOSCOPY N/A 2020    Procedure: COLONOSCOPY;  Surgeon: Jarrod Pena MD;  Location:  GI     EXTRACAPSULAR CATARACT EXTRATION WITH INTRAOCULAR LENS IMPLANT      right eye     H ABLATION FOCAL AFIB  2017       FAMILY HISTORY:  Family History   Problem Relation Age of Onset     Cancer Mother      Cancer Father      Colon Cancer Father      Prostate Cancer No family hx of        SOCIAL HISTORY:  Social History     Socioeconomic History     Marital status:      Spouse name: None     Number of children: None     Years of education: None     Highest education level: None   Occupational History     None   Social Needs     Financial resource strain: None     Food insecurity     Worry: None     Inability: None     Transportation needs     Medical: None     Non-medical: None   Tobacco Use     Smoking status: Former Smoker     Years: 38.00     Types: Cigarettes     Quit date: 2012     Years since quittin.7     Smokeless tobacco: Never Used   Substance and Sexual Activity     Alcohol use: Yes     Comment: ocass     Drug use: No     Sexual activity: Yes     Partners: Female   Lifestyle     Physical activity     Days per week: None     Minutes per session: None      Stress: None   Relationships     Social connections     Talks on phone: None     Gets together: None     Attends Restorationism service: None     Active member of club or organization: None     Attends meetings of clubs or organizations: None     Relationship status: None     Intimate partner violence     Fear of current or ex partner: None     Emotionally abused: None     Physically abused: None     Forced sexual activity: None   Other Topics Concern     Parent/sibling w/ CABG, MI or angioplasty before 65F 55M? No      Service Yes     Blood Transfusions No     Caffeine Concern No     Occupational Exposure No     Hobby Hazards No     Sleep Concern No     Stress Concern No     Weight Concern No     Special Diet No     Back Care No     Exercise Yes     Bike Helmet No     Seat Belt Yes     Self-Exams No   Social History Narrative     None

## 2021-05-17 ENCOUNTER — OFFICE VISIT (OUTPATIENT)
Dept: CARDIOLOGY | Facility: CLINIC | Age: 76
End: 2021-05-17
Attending: INTERNAL MEDICINE
Payer: COMMERCIAL

## 2021-05-17 VITALS
DIASTOLIC BLOOD PRESSURE: 78 MMHG | HEART RATE: 62 BPM | HEIGHT: 65 IN | SYSTOLIC BLOOD PRESSURE: 132 MMHG | BODY MASS INDEX: 28.99 KG/M2 | WEIGHT: 174 LBS

## 2021-05-17 DIAGNOSIS — I48.19 PERSISTENT ATRIAL FIBRILLATION (H): ICD-10-CM

## 2021-05-17 DIAGNOSIS — E78.5 DYSLIPIDEMIA: Primary | ICD-10-CM

## 2021-05-17 PROCEDURE — 93000 ELECTROCARDIOGRAM COMPLETE: CPT | Performed by: PHYSICIAN ASSISTANT

## 2021-05-17 PROCEDURE — 99214 OFFICE O/P EST MOD 30 MIN: CPT | Performed by: PHYSICIAN ASSISTANT

## 2021-05-17 ASSESSMENT — MIFFLIN-ST. JEOR: SCORE: 1461.14

## 2021-05-17 NOTE — PATIENT INSTRUCTIONS
"Charlene - it was nice see you today!    1. Reviewed your EKG showing no atrial fibrillation  2. Reviewed your issue with the atorvastatin    PLAN:  1. Continue to monitor for recurrent atrial fibrillation. CALL if you're back in AFib  2. Reviewed cholesterol. Goal is to reduce risk of heart attack, death and stroke BUT not at the expense of side effects that change your quality of life.  3. Recheck cholesterol in 6 months to see if lifestyle changes have helped!      Stay off of atorvastatin   INCREASE increase - aim for at least 150 minutes of AEROBIC exercise/week   INCREASE fiber - Metamucil, veggies/fruits   Limit processed foods    For LDL/bad cholesterol    Limit saturated fat in diet (from animal products)    Red meat    High fat dairy products, like cheese, ice cream, butter, etc    Processed foods (creamy salad dressings, peanut butter ... Check serving size!!)    Restaurant foods    Limit \"junk\" - crackers, chips, cookies, etc      [phone]             "

## 2021-05-17 NOTE — LETTER
"5/17/2021    MIKE  [facility] [address]  [facility] [id number]    RE: Sethsai JENSEN Matt       Dear Colleague,    I had the pleasure of seeing Charlene Gutierrez in the [facility].    [facility]    I had the pleasure of seeing Charlene when he came for follow up of recent DCCV.  This 75 year old sees Dr. Aguayo for his history of:    1. HTN  2. Dyslipidemia - hasn't tolerated simvastatin, atorvastatin  3. Recurrent persistent AFib - s/p PVI/CTI ablation 7/6/2017 after failing chronic flecainide. Required DCCV/amiodarone 8/2017. Switched back to flecainide 11/2017 and did well until 2/2021, back in AFib and repeat DCCV 2/2021    Dr. Aguayo saw Charlene 2/2021 when he called d/t 4 day h/o recurrent palpitations. He was set up for DCCV, which was done 2/15/2021. 3-4 m follow-up rec'd.     Interval History:  No recurrence of prolonged palpitations or atrial fibrillation.  Remains on Eliquis and no bleeding issues that he's aware of    He ended up stopping atorvastatin d/t increased belching. Since stopping atorvastatin, this improved. In the past, he's also had weight gain, forearm aching with this.  He's now tried the atorvastatin twice and has had odd side effects both times.  Simvastatin was stopped many years ago d/t increasing bruising. No h/o stents or known CAD.    BPs at home 130s, similar to what I got when I rechecked    VITALS:  Vitals: /78   Pulse 62   Ht 1.651 m (5' 5\")   Wt 78.9 kg (174 lb)   BMI 28.96 kg/m      Diagnostic Testing:  EKG today, which I overread, showed SR 64 bpm. QRS duration on flecainide 104 ms  Exercise Stress Echocardiogram 2/2018 - nl  RHETT 6/2017 showed EF 55-60%    Plan:  1. Dietary/exercise changes for lipid    Assessment/Plan:    1. Recurrent persistent AFib    As above, required DCCV 8/2017 and again 2/2021 following PVI/CTI ablation 7/2017    Remains on AC with Eliquis 5 " "mg BID for CHADSVASc 2 (HTN, age)    No recurrence and today's EKG is acceptable    PLAN:    Continue flecainide and Eliquis    2. Hyperlipidemia    Was on simvastatin many years ago, atorvastatin has now been tried twice and stopped d/t atypical ADRs. These did largely improve after stopping the atorvastatin, and frequency increased after restarting it    10-year Risk is 27% according to ASCVD risk score (using today's BP and lipids from 3/2021). No lifetime risk calculated as >61 y/o    PLAN:    Agreed to try TLC x 6 months and repeat. Increasing fiber, increased exercise, etc (see AVS). Follows a \"pretty Mediterranean Diet\" already  Will see me back 6 months with fasting lipids. Noted that at these levels, could reduce risk x 7% (to 20.3%) with statin therapy      Symone Luna PA-C, MSPAS      Orders Placed This Encounter   Procedures     Lipid Profile     Follow-Up with Cardiac Advanced Practice Provider     EKG 12-lead complete w/read - Clinics (performed today)     No orders of the defined types were placed in this encounter.    Medications Discontinued During This Encounter   Medication Reason     atorvastatin (LIPITOR) 10 MG tablet Stopped by Patient     STATIN NOT PRESCRIBED, INTENTIONAL,          Encounter Diagnoses   Name Primary?     Persistent atrial fibrillation (H)      Dyslipidemia Yes       CURRENT MEDICATIONS:  Current Outpatient Medications   Medication Sig Dispense Refill     apixaban ANTICOAGULANT (ELIQUIS ANTICOAGULANT) 5 MG tablet Take 1 tablet (5 mg) by mouth 2 times daily 180 tablet 1     flecainide (TAMBOCOR) 100 MG tablet Take 1 tablet (100 mg) by mouth 2 times daily 180 tablet 3     IRON 325 (65 FE) MG OR TABS 1 TABLET DAILY       lisinopril (PRINIVIL/ZESTRIL) 5 MG tablet Take 1 tablet (5 mg) by mouth daily 90 tablet 3     metoprolol succinate ER (TOPROL XL) 25 MG 24 hr tablet Take 1 tablet (25 mg) by mouth daily 90 tablet 3     sildenafil (VIAGRA) 50 MG tablet Take 0.5-1 tablets (25-50 " "mg) by mouth daily as needed for erectile dysfunction Take 30 min to 4 hours before intercourse.  Never use with nitroglycerin, terazosin or doxazosin. 36 tablet 0     Elastic Bandage MISC 1 each daily. Use it on the right leg as instructed. 1 each 1     Elastic Bandages & Supports (JOBST FOR MEN KNEE HIGH/LG) MISC 1 each daily Use it on the right leg as instructed 1 each 1     nitroglycerin (NITROSTAT) 0.4 MG sublingual tablet For chest pain place 1 tablet under the tongue every 5 minutes for 3 doses. If symptoms persist 5 minutes after 1st dose call 911. 25 tablet 0     ORDER FOR DME Equipment being ordered: heel lift- MD 1 Device 0       ALLERGIES   No Known Allergies      Review of Systems:  Skin:  Negative rash   Eyes:  Positive for glasses  ENT:  Negative    Respiratory:  Negative shortness of breath;dyspnea on exertion;cough  Cardiovascular:  Negative;palpitations    Gastroenterology: Negative melena;hematochezia  Genitourinary:  Negative dysuria  Musculoskeletal:  Positive for arthritis;joint pain  Neurologic:  Negative speech problems;headaches;local weakness;paralysis;incoordination  Psychiatric:  Negative    Heme/Lymph/Imm:  Negative    Endocrine:  Negative      Physical Exam:  Vitals: /78   Pulse 62   Ht 1.651 m (5' 5\")   Wt 78.9 kg (174 lb)   BMI 28.96 kg/m      Constitutional:  cooperative, alert and oriented, well developed, well nourished, in no acute distress        Skin:  warm and dry to the touch, no apparent skin lesions or masses noted        Head:  normocephalic, no masses or lesions        Eyes:  conjunctivae and lids unremarkable;sclera white;no xanthalasma        ENT:  not assessed this visit        Neck:  not assessed this visit        Chest:  normal breath sounds, clear to auscultation, normal A-P diameter, normal symmetry, normal respiratory excursion, no use of accessory muscles        Cardiac: no murmurs, gallops or rubs detected;regular rhythm                  Abdomen:  " abdomen soft        Vascular: pulses full and equal                                 no tenderness     Extremities and Back:  no deformities, clubbing, cyanosis, erythema observed;no edema        Neurological:  no gross motor deficits            PAST MEDICAL HISTORY:  Past Medical History:   Diagnosis Date     CKD (chronic kidney disease) 2012     Gastric ulcer      HTN (hypertension), benign      Hyperlipidaemia      Paroxysmal atrial fibrillation (H) 11/17/2013       PAST SURGICAL HISTORY:  Past Surgical History:   Procedure Laterality Date     ANESTHESIA CARDIOVERSION N/A 6/5/2017    Procedure: ANESTHESIA CARDIOVERSION;  ANESTHESIA NEEDED FOR CARDIOVERSION IN CARE SUITES. (RHETT AT 0830);  Surgeon: GENERIC ANESTHESIA PROVIDER;  Location:  OR     ANESTHESIA CARDIOVERSION N/A 2/15/2021    Procedure: ANESTHESIA, FOR CARDIOVERSION;  Surgeon: GENERIC ANESTHESIA PROVIDER;  Location:  OR     BRONCHOSCOPY       C INCISION OF PYLORIC MUSCLE       CARDIOVERSION  11/2013, 8/22/17     COLONOSCOPY       COLONOSCOPY N/A 12/1/2020    Procedure: COLONOSCOPY;  Surgeon: Jarrod Pena MD;  Location:  GI     EXTRACAPSULAR CATARACT EXTRATION WITH INTRAOCULAR LENS IMPLANT  2003    right eye     H ABLATION FOCAL AFIB  07/06/2017       FAMILY HISTORY:  Family History   Problem Relation Age of Onset     Cancer Mother      Cancer Father      Colon Cancer Father      Prostate Cancer No family hx of        SOCIAL HISTORY:  Social History     Socioeconomic History     Marital status:      Spouse name: None     Number of children: None     Years of education: None     Highest education level: None   Occupational History     None   Social Needs     Financial resource strain: None     Food insecurity     Worry: None     Inability: None     Transportation needs     Medical: None     Non-medical: None   Tobacco Use     Smoking status: Former Smoker     Years: 38.00     Types: Cigarettes     Quit date: 8/1/2012     Years since  quittin.7     Smokeless tobacco: Never Used   Substance and Sexual Activity     Alcohol use: Yes     Comment: ocass     Drug use: No     Sexual activity: Yes     Partners: Female   Lifestyle     Physical activity     Days per week: None     Minutes per session: None     Stress: None   Relationships     Social connections     Talks on phone: None     Gets together: None     Attends Alevism service: None     Active member of club or organization: None     Attends meetings of clubs or organizations: None     Relationship status: None     Intimate partner violence     Fear of current or ex partner: None     Emotionally abused: None     Physically abused: None     Forced sexual activity: None   Other Topics Concern     Parent/sibling w/ CABG, MI or angioplasty before 65F 55M? No      Service Yes     Blood Transfusions No     Caffeine Concern No     Occupational Exposure No     Hobby Hazards No     Sleep Concern No     Stress Concern No     Weight Concern No     Special Diet No     Back Care No     Exercise Yes     Bike Helmet No     Seat Belt Yes     Self-Exams No   Social History Narrative     None        Thank you for allowing me to participate in the care of your patient.      Sincerely,     Jyoti Luna PA-C     [facility]    cc:   Sreekanth Aguayo MD  [address] [facility] [address]  [place] [address]

## 2021-05-21 DIAGNOSIS — I48.91 ATRIAL FIBRILLATION, UNSPECIFIED TYPE (H): ICD-10-CM

## 2021-09-26 ENCOUNTER — HEALTH MAINTENANCE LETTER (OUTPATIENT)
Age: 76
End: 2021-09-26

## 2021-11-12 ENCOUNTER — TELEPHONE (OUTPATIENT)
Dept: CARDIOLOGY | Facility: CLINIC | Age: 76
End: 2021-11-12
Payer: COMMERCIAL

## 2021-11-12 DIAGNOSIS — I48.19 PERSISTENT ATRIAL FIBRILLATION (H): ICD-10-CM

## 2021-11-12 RX ORDER — METOPROLOL SUCCINATE 25 MG/1
25 TABLET, EXTENDED RELEASE ORAL DAILY
Qty: 90 TABLET | Refills: 0 | Status: SHIPPED | OUTPATIENT
Start: 2021-11-12 | End: 2022-01-13

## 2021-11-12 RX ORDER — FLECAINIDE ACETATE 100 MG/1
100 TABLET ORAL 2 TIMES DAILY
Qty: 180 TABLET | Refills: 0 | Status: SHIPPED | OUTPATIENT
Start: 2021-11-12 | End: 2022-01-13

## 2021-11-12 NOTE — TELEPHONE ENCOUNTER
Pt called and states that he is in the works of moving to [place] permanently. States that he has not found an doctor at this time and is needing a refill of his Flecainide and Metoprolol.  Told pt that this writer will send 90 days, so that he has time to look for a new PCP and/or Cardilogist.  Explained that he needs to have an EKG done yearly. Pt states understanding. Pt also stated that 20 days ago he went into A Fib, but states that he feels fine and is able to do his walking and working with no issues. Pt did want Dr Aguayo asked if he had any recommendations for EP MD's in [place], [place], explained that he may not know anyone, but will ask. Arvin     Addendum: [employer] message to pt with a MD name-Chet Carvajal MD at [place]. AMY

## 2022-01-13 DIAGNOSIS — I48.19 PERSISTENT ATRIAL FIBRILLATION (H): ICD-10-CM

## 2022-01-13 RX ORDER — FLECAINIDE ACETATE 100 MG/1
100 TABLET ORAL 2 TIMES DAILY
Qty: 180 TABLET | Refills: 0 | Status: SHIPPED | OUTPATIENT
Start: 2022-01-13

## 2022-01-13 RX ORDER — METOPROLOL SUCCINATE 25 MG/1
25 TABLET, EXTENDED RELEASE ORAL DAILY
Qty: 90 TABLET | Refills: 0 | Status: SHIPPED | OUTPATIENT
Start: 2022-01-13 | End: 2022-05-02

## 2022-01-17 ENCOUNTER — DOCUMENTATION ONLY (OUTPATIENT)
Dept: CARDIOLOGY | Facility: CLINIC | Age: 77
End: 2022-01-17
Payer: COMMERCIAL

## 2022-01-18 NOTE — PROGRESS NOTES
Record request from [facility] paper work faxed to HIM on 1/13/22. [email or web address]

## 2022-05-02 DIAGNOSIS — I48.19 PERSISTENT ATRIAL FIBRILLATION (H): ICD-10-CM

## 2022-05-02 RX ORDER — METOPROLOL SUCCINATE 25 MG/1
25 TABLET, EXTENDED RELEASE ORAL DAILY
Qty: 90 TABLET | Refills: 0 | Status: SHIPPED | OUTPATIENT
Start: 2022-05-02

## 2022-05-02 NOTE — TELEPHONE ENCOUNTER
[facility] Refill Guideline reviewed.  Medication meets criteria for refill. Metoprolol succinate 25mg sent to [facility].

## 2022-05-08 ENCOUNTER — HEALTH MAINTENANCE LETTER (OUTPATIENT)
Age: 77
End: 2022-05-08

## 2023-01-14 ENCOUNTER — HEALTH MAINTENANCE LETTER (OUTPATIENT)
Age: 78
End: 2023-01-14

## 2023-06-02 ENCOUNTER — HEALTH MAINTENANCE LETTER (OUTPATIENT)
Age: 78
End: 2023-06-02

## (undated) DEVICE — SOL WATER IRRIG 1000ML BOTTLE 2F7114

## (undated) RX ORDER — FUROSEMIDE 10 MG/ML
INJECTION INTRAMUSCULAR; INTRAVENOUS
Status: DISPENSED
Start: 2017-07-06

## (undated) RX ORDER — PROTAMINE SULFATE 10 MG/ML
INJECTION, SOLUTION INTRAVENOUS
Status: DISPENSED
Start: 2017-07-06

## (undated) RX ORDER — GLYCOPYRROLATE 0.2 MG/ML
INJECTION, SOLUTION INTRAMUSCULAR; INTRAVENOUS
Status: DISPENSED
Start: 2017-06-05

## (undated) RX ORDER — NALOXONE HYDROCHLORIDE 0.4 MG/ML
INJECTION, SOLUTION INTRAMUSCULAR; INTRAVENOUS; SUBCUTANEOUS
Status: DISPENSED
Start: 2017-06-05

## (undated) RX ORDER — FENTANYL CITRATE 50 UG/ML
INJECTION, SOLUTION INTRAMUSCULAR; INTRAVENOUS
Status: DISPENSED
Start: 2017-06-05

## (undated) RX ORDER — HEPARIN SODIUM 1000 [USP'U]/ML
INJECTION, SOLUTION INTRAVENOUS; SUBCUTANEOUS
Status: DISPENSED
Start: 2017-07-06

## (undated) RX ORDER — KETOROLAC TROMETHAMINE 30 MG/ML
INJECTION, SOLUTION INTRAMUSCULAR; INTRAVENOUS
Status: DISPENSED
Start: 2017-07-06

## (undated) RX ORDER — FLUMAZENIL 0.1 MG/ML
INJECTION, SOLUTION INTRAVENOUS
Status: DISPENSED
Start: 2017-06-05

## (undated) RX ORDER — LIDOCAINE HYDROCHLORIDE 10 MG/ML
INJECTION, SOLUTION EPIDURAL; INFILTRATION; INTRACAUDAL; PERINEURAL
Status: DISPENSED
Start: 2017-07-06

## (undated) RX ORDER — FENTANYL CITRATE 50 UG/ML
INJECTION, SOLUTION INTRAMUSCULAR; INTRAVENOUS
Status: DISPENSED
Start: 2020-12-01